# Patient Record
Sex: MALE | Race: WHITE | Employment: PART TIME | URBAN - METROPOLITAN AREA
[De-identification: names, ages, dates, MRNs, and addresses within clinical notes are randomized per-mention and may not be internally consistent; named-entity substitution may affect disease eponyms.]

---

## 2017-03-11 ENCOUNTER — HOSPITAL ENCOUNTER (OUTPATIENT)
Dept: MRI IMAGING | Age: 57
Discharge: HOME | End: 2017-03-11
Attending: PAIN MEDICINE
Payer: MEDICARE

## 2017-03-11 DIAGNOSIS — M54.16 LUMBAR RADICULOPATHY: ICD-10-CM

## 2017-03-11 DIAGNOSIS — M79.18 GLUTEAL PAIN: ICD-10-CM

## 2017-03-11 DIAGNOSIS — M47.26 OTHER SPONDYLOSIS WITH RADICULOPATHY, LUMBAR REGION: ICD-10-CM

## 2017-03-11 PROCEDURE — 72148 MRI LUMBAR SPINE W/O DYE: CPT

## 2017-08-30 ENCOUNTER — HOSPITAL ENCOUNTER (OUTPATIENT)
Dept: PREADMISSION TESTING | Age: 57
Discharge: HOME | End: 2017-08-30
Payer: MEDICARE

## 2017-08-30 VITALS
TEMPERATURE: 97.4 F | HEIGHT: 71 IN | BODY MASS INDEX: 23.8 KG/M2 | SYSTOLIC BLOOD PRESSURE: 161 MMHG | RESPIRATION RATE: 18 BRPM | WEIGHT: 170 LBS | HEART RATE: 47 BPM | DIASTOLIC BLOOD PRESSURE: 83 MMHG

## 2017-08-30 LAB
ABO + RH BLD: NORMAL
ANION GAP SERPL CALC-SCNC: 9 MMOL/L (ref 5–15)
APPEARANCE UR: CLEAR
BACTERIA URNS QL MICRO: NEGATIVE /HPF
BILIRUB UR QL: NEGATIVE
BLOOD GROUP ANTIBODIES SERPL: NORMAL
BUN SERPL-MCNC: 11 MG/DL (ref 6–20)
BUN/CREAT SERPL: 9 (ref 12–20)
CALCIUM SERPL-MCNC: 9.1 MG/DL (ref 8.5–10.1)
CHLORIDE SERPL-SCNC: 102 MMOL/L (ref 97–108)
CO2 SERPL-SCNC: 28 MMOL/L (ref 21–32)
COLOR UR: NORMAL
CREAT SERPL-MCNC: 1.27 MG/DL (ref 0.7–1.3)
EPITH CASTS URNS QL MICRO: NORMAL /LPF
ERYTHROCYTE [DISTWIDTH] IN BLOOD BY AUTOMATED COUNT: 15 % (ref 11.5–14.5)
EST. AVERAGE GLUCOSE BLD GHB EST-MCNC: 117 MG/DL
GLUCOSE SERPL-MCNC: 110 MG/DL (ref 65–100)
GLUCOSE UR STRIP.AUTO-MCNC: NEGATIVE MG/DL
HBA1C MFR BLD: 5.7 % (ref 4.2–6.3)
HCT VFR BLD AUTO: 48.5 % (ref 36.6–50.3)
HGB BLD-MCNC: 16.3 G/DL (ref 12.1–17)
HGB UR QL STRIP: NEGATIVE
HYALINE CASTS URNS QL MICRO: NORMAL /LPF (ref 0–5)
INR PPP: 1 (ref 0.9–1.1)
KETONES UR QL STRIP.AUTO: NEGATIVE MG/DL
LEUKOCYTE ESTERASE UR QL STRIP.AUTO: NEGATIVE
MCH RBC QN AUTO: 31.2 PG (ref 26–34)
MCHC RBC AUTO-ENTMCNC: 33.6 G/DL (ref 30–36.5)
MCV RBC AUTO: 92.9 FL (ref 80–99)
NITRITE UR QL STRIP.AUTO: NEGATIVE
PH UR STRIP: 6 [PH] (ref 5–8)
PLATELET # BLD AUTO: 301 K/UL (ref 150–400)
POTASSIUM SERPL-SCNC: 4.1 MMOL/L (ref 3.5–5.1)
PROT UR STRIP-MCNC: NEGATIVE MG/DL
PROTHROMBIN TIME: 9.6 SEC (ref 9–11.1)
RBC # BLD AUTO: 5.22 M/UL (ref 4.1–5.7)
RBC #/AREA URNS HPF: NORMAL /HPF (ref 0–5)
SODIUM SERPL-SCNC: 139 MMOL/L (ref 136–145)
SP GR UR REFRACTOMETRY: 1.02 (ref 1–1.03)
SPECIMEN EXP DATE BLD: NORMAL
UA: UC IF INDICATED,UAUC: NORMAL
UROBILINOGEN UR QL STRIP.AUTO: 0.2 EU/DL (ref 0.2–1)
WBC # BLD AUTO: 5.2 K/UL (ref 4.1–11.1)
WBC URNS QL MICRO: NORMAL /HPF (ref 0–4)

## 2017-08-30 PROCEDURE — 83036 HEMOGLOBIN GLYCOSYLATED A1C: CPT | Performed by: ORTHOPAEDIC SURGERY

## 2017-08-30 PROCEDURE — 85610 PROTHROMBIN TIME: CPT | Performed by: ORTHOPAEDIC SURGERY

## 2017-08-30 PROCEDURE — 86900 BLOOD TYPING SEROLOGIC ABO: CPT | Performed by: ORTHOPAEDIC SURGERY

## 2017-08-30 PROCEDURE — 81001 URINALYSIS AUTO W/SCOPE: CPT | Performed by: ORTHOPAEDIC SURGERY

## 2017-08-30 PROCEDURE — 93005 ELECTROCARDIOGRAM TRACING: CPT

## 2017-08-30 PROCEDURE — 85027 COMPLETE CBC AUTOMATED: CPT | Performed by: ORTHOPAEDIC SURGERY

## 2017-08-30 PROCEDURE — 36415 COLL VENOUS BLD VENIPUNCTURE: CPT | Performed by: ORTHOPAEDIC SURGERY

## 2017-08-30 PROCEDURE — 80048 BASIC METABOLIC PNL TOTAL CA: CPT | Performed by: ORTHOPAEDIC SURGERY

## 2017-08-30 RX ORDER — IBUPROFEN 200 MG
600 TABLET ORAL AS NEEDED
Status: ON HOLD | COMMUNITY
End: 2017-09-20

## 2017-08-30 NOTE — PERIOP NOTES
PREOPERATIVE INSTRUCTIONS REVIEWED WITH PATIENT. PATIENT GIVEN SIX PACK OF CHG WIPES. INSTRUCTIONS  TO BE REVIEWED  IN CLASS] ON USE OF CHG WIPES. PATIENT GIVEN SSI INFECTION FAQ SHEET, INFORMATION SHEET ON DIABETIC TREATMENT CENTER AS WELL AS HAND WASHING TIPS SHEETS. MRSA/MSSA TREATMENT INSTRUCTION SHEET GIVEN WITH AN EXPLANATION TO PATIENT THAT THEY WILL BE NOTIFIED IF TREATMENT INSTRUCTIONS NEED TO BE INITIATED. PATIENT WAS GIVEN THE OPPORTUNITY TO ASK QUESTIONS ON THE INFORMATION PROVIDED.

## 2017-08-31 LAB
ATRIAL RATE: 52 BPM
BACTERIA SPEC CULT: NORMAL
BACTERIA SPEC CULT: NORMAL
CALCULATED P AXIS, ECG09: 61 DEGREES
CALCULATED R AXIS, ECG10: -23 DEGREES
CALCULATED T AXIS, ECG11: 45 DEGREES
DIAGNOSIS, 93000: NORMAL
P-R INTERVAL, ECG05: 140 MS
Q-T INTERVAL, ECG07: 452 MS
QRS DURATION, ECG06: 84 MS
QTC CALCULATION (BEZET), ECG08: 420 MS
SERVICE CMNT-IMP: NORMAL
VENTRICULAR RATE, ECG03: 52 BPM

## 2017-09-20 ENCOUNTER — ANESTHESIA (OUTPATIENT)
Dept: SURGERY | Age: 57
DRG: 460 | End: 2017-09-20
Payer: MEDICARE

## 2017-09-20 ENCOUNTER — HOSPITAL ENCOUNTER (INPATIENT)
Age: 57
LOS: 4 days | Discharge: HOME HEALTH | DRG: 460 | End: 2017-09-24
Attending: ORTHOPAEDIC SURGERY | Admitting: ORTHOPAEDIC SURGERY
Payer: MEDICARE

## 2017-09-20 ENCOUNTER — APPOINTMENT (OUTPATIENT)
Dept: GENERAL RADIOLOGY | Age: 57
DRG: 460 | End: 2017-09-20
Attending: ORTHOPAEDIC SURGERY
Payer: MEDICARE

## 2017-09-20 ENCOUNTER — ANESTHESIA EVENT (OUTPATIENT)
Dept: SURGERY | Age: 57
DRG: 460 | End: 2017-09-20
Payer: MEDICARE

## 2017-09-20 PROBLEM — M48.00 SPINAL STENOSIS: Status: ACTIVE | Noted: 2017-09-20

## 2017-09-20 LAB
ABO + RH BLD: NORMAL
BLOOD GROUP ANTIBODIES SERPL: NORMAL
GLUCOSE BLD STRIP.AUTO-MCNC: 97 MG/DL (ref 65–100)
SERVICE CMNT-IMP: NORMAL
SPECIMEN EXP DATE BLD: NORMAL

## 2017-09-20 PROCEDURE — 74011250637 HC RX REV CODE- 250/637: Performed by: ANESTHESIOLOGY

## 2017-09-20 PROCEDURE — C1713 ANCHOR/SCREW BN/BN,TIS/BN: HCPCS | Performed by: ORTHOPAEDIC SURGERY

## 2017-09-20 PROCEDURE — 77030034479 HC ADH SKN CLSR PRINEO J&J -B: Performed by: ORTHOPAEDIC SURGERY

## 2017-09-20 PROCEDURE — 65270000032 HC RM SEMIPRIVATE

## 2017-09-20 PROCEDURE — 76010000172 HC OR TIME 2.5 TO 3 HR INTENSV-TIER 1: Performed by: ORTHOPAEDIC SURGERY

## 2017-09-20 PROCEDURE — 77030032490 HC SLV COMPR SCD KNE COVD -B: Performed by: ORTHOPAEDIC SURGERY

## 2017-09-20 PROCEDURE — 77030014007 HC SPNG HEMSTAT J&J -B: Performed by: ORTHOPAEDIC SURGERY

## 2017-09-20 PROCEDURE — 77030018836 HC SOL IRR NACL ICUM -A: Performed by: ORTHOPAEDIC SURGERY

## 2017-09-20 PROCEDURE — 77030012406 HC DRN WND PENRS BARD -A: Performed by: ORTHOPAEDIC SURGERY

## 2017-09-20 PROCEDURE — 82962 GLUCOSE BLOOD TEST: CPT

## 2017-09-20 PROCEDURE — 74011250636 HC RX REV CODE- 250/636: Performed by: ANESTHESIOLOGY

## 2017-09-20 PROCEDURE — 74011250636 HC RX REV CODE- 250/636

## 2017-09-20 PROCEDURE — 76060000036 HC ANESTHESIA 2.5 TO 3 HR: Performed by: ORTHOPAEDIC SURGERY

## 2017-09-20 PROCEDURE — 74011000250 HC RX REV CODE- 250

## 2017-09-20 PROCEDURE — 77030029099 HC BN WAX SSPC -A: Performed by: ORTHOPAEDIC SURGERY

## 2017-09-20 PROCEDURE — 77030034850: Performed by: ORTHOPAEDIC SURGERY

## 2017-09-20 PROCEDURE — 72100 X-RAY EXAM L-S SPINE 2/3 VWS: CPT

## 2017-09-20 PROCEDURE — 77030031139 HC SUT VCRL2 J&J -A: Performed by: ORTHOPAEDIC SURGERY

## 2017-09-20 PROCEDURE — 77030012893

## 2017-09-20 PROCEDURE — 74011250636 HC RX REV CODE- 250/636: Performed by: ORTHOPAEDIC SURGERY

## 2017-09-20 PROCEDURE — 74011000258 HC RX REV CODE- 258: Performed by: ORTHOPAEDIC SURGERY

## 2017-09-20 PROCEDURE — 76210000016 HC OR PH I REC 1 TO 1.5 HR: Performed by: ORTHOPAEDIC SURGERY

## 2017-09-20 PROCEDURE — 86900 BLOOD TYPING SEROLOGIC ABO: CPT | Performed by: ORTHOPAEDIC SURGERY

## 2017-09-20 PROCEDURE — 74011250637 HC RX REV CODE- 250/637: Performed by: PHYSICIAN ASSISTANT

## 2017-09-20 PROCEDURE — 77030004391 HC BUR FLUT MEDT -C: Performed by: ORTHOPAEDIC SURGERY

## 2017-09-20 PROCEDURE — 74011000250 HC RX REV CODE- 250: Performed by: ORTHOPAEDIC SURGERY

## 2017-09-20 PROCEDURE — 74011250636 HC RX REV CODE- 250/636: Performed by: PHYSICIAN ASSISTANT

## 2017-09-20 PROCEDURE — 77030020782 HC GWN BAIR PAWS FLX 3M -B

## 2017-09-20 PROCEDURE — 36415 COLL VENOUS BLD VENIPUNCTURE: CPT | Performed by: ORTHOPAEDIC SURGERY

## 2017-09-20 DEVICE — GRAFT BNE SUB 10ML ALLGRFT PTTY OSTEOAMP: Type: IMPLANTABLE DEVICE | Site: SPINE LUMBAR | Status: FUNCTIONAL

## 2017-09-20 DEVICE — GRAFT BNE SUB 20CC 2 4MM GRAN GROWTH FACT ALLGRFT OSTEOAMP: Type: IMPLANTABLE DEVICE | Site: SPINE LUMBAR | Status: FUNCTIONAL

## 2017-09-20 DEVICE — 5.5MM CURVED ROD, TITANIUM ALLOY, 65MM LENGTH
Type: IMPLANTABLE DEVICE | Site: SPINE LUMBAR | Status: FUNCTIONAL
Brand: CREO

## 2017-09-20 DEVICE — CREO&REG; 5.5, 6.5 X 45MM POLYAXIAL SCREW
Type: IMPLANTABLE DEVICE | Site: SPINE LUMBAR | Status: FUNCTIONAL
Brand: CREO

## 2017-09-20 DEVICE — CREO&REG; 5.5, 7.5 X 45MM POLYAXIAL SCREW
Type: IMPLANTABLE DEVICE | Site: SPINE LUMBAR | Status: FUNCTIONAL
Brand: CREO

## 2017-09-20 DEVICE — 5.5 LOCKING CAP, CREO
Type: IMPLANTABLE DEVICE | Site: SPINE LUMBAR | Status: FUNCTIONAL
Brand: CREO

## 2017-09-20 RX ORDER — KETAMINE HYDROCHLORIDE 10 MG/ML
INJECTION, SOLUTION INTRAMUSCULAR; INTRAVENOUS AS NEEDED
Status: DISCONTINUED | OUTPATIENT
Start: 2017-09-20 | End: 2017-09-20 | Stop reason: HOSPADM

## 2017-09-20 RX ORDER — DIPHENHYDRAMINE HYDROCHLORIDE 50 MG/ML
12.5 INJECTION, SOLUTION INTRAMUSCULAR; INTRAVENOUS
Status: ACTIVE | OUTPATIENT
Start: 2017-09-20 | End: 2017-09-23

## 2017-09-20 RX ORDER — ROCURONIUM BROMIDE 10 MG/ML
INJECTION, SOLUTION INTRAVENOUS AS NEEDED
Status: DISCONTINUED | OUTPATIENT
Start: 2017-09-20 | End: 2017-09-20 | Stop reason: HOSPADM

## 2017-09-20 RX ORDER — FENTANYL CITRATE 50 UG/ML
50 INJECTION, SOLUTION INTRAMUSCULAR; INTRAVENOUS AS NEEDED
Status: DISCONTINUED | OUTPATIENT
Start: 2017-09-20 | End: 2017-09-20 | Stop reason: HOSPADM

## 2017-09-20 RX ORDER — DEXMEDETOMIDINE HYDROCHLORIDE 4 UG/ML
INJECTION, SOLUTION INTRAVENOUS AS NEEDED
Status: DISCONTINUED | OUTPATIENT
Start: 2017-09-20 | End: 2017-09-20 | Stop reason: HOSPADM

## 2017-09-20 RX ORDER — SODIUM CHLORIDE 0.9 % (FLUSH) 0.9 %
5-10 SYRINGE (ML) INJECTION EVERY 8 HOURS
Status: DISCONTINUED | OUTPATIENT
Start: 2017-09-21 | End: 2017-09-24 | Stop reason: HOSPADM

## 2017-09-20 RX ORDER — MORPHINE SULFATE 10 MG/ML
2 INJECTION, SOLUTION INTRAMUSCULAR; INTRAVENOUS
Status: DISCONTINUED | OUTPATIENT
Start: 2017-09-20 | End: 2017-09-20 | Stop reason: HOSPADM

## 2017-09-20 RX ORDER — SODIUM CHLORIDE 0.9 % (FLUSH) 0.9 %
5-10 SYRINGE (ML) INJECTION AS NEEDED
Status: DISCONTINUED | OUTPATIENT
Start: 2017-09-20 | End: 2017-09-24 | Stop reason: HOSPADM

## 2017-09-20 RX ORDER — DEXAMETHASONE SODIUM PHOSPHATE 4 MG/ML
INJECTION, SOLUTION INTRA-ARTICULAR; INTRALESIONAL; INTRAMUSCULAR; INTRAVENOUS; SOFT TISSUE AS NEEDED
Status: DISCONTINUED | OUTPATIENT
Start: 2017-09-20 | End: 2017-09-20 | Stop reason: HOSPADM

## 2017-09-20 RX ORDER — FACIAL-BODY WIPES
10 EACH TOPICAL DAILY PRN
Status: DISCONTINUED | OUTPATIENT
Start: 2017-09-22 | End: 2017-09-24 | Stop reason: HOSPADM

## 2017-09-20 RX ORDER — OXYCODONE AND ACETAMINOPHEN 5; 325 MG/1; MG/1
2 TABLET ORAL
COMMUNITY

## 2017-09-20 RX ORDER — ACETAMINOPHEN 325 MG/1
650 TABLET ORAL ONCE
Status: COMPLETED | OUTPATIENT
Start: 2017-09-20 | End: 2017-09-20

## 2017-09-20 RX ORDER — PANTOPRAZOLE SODIUM 40 MG/1
40 TABLET, DELAYED RELEASE ORAL DAILY
Status: DISCONTINUED | OUTPATIENT
Start: 2017-09-21 | End: 2017-09-21

## 2017-09-20 RX ORDER — FENTANYL CITRATE 50 UG/ML
25 INJECTION, SOLUTION INTRAMUSCULAR; INTRAVENOUS
Status: DISCONTINUED | OUTPATIENT
Start: 2017-09-20 | End: 2017-09-20 | Stop reason: HOSPADM

## 2017-09-20 RX ORDER — LIDOCAINE HYDROCHLORIDE 10 MG/ML
0.1 INJECTION, SOLUTION EPIDURAL; INFILTRATION; INTRACAUDAL; PERINEURAL AS NEEDED
Status: DISCONTINUED | OUTPATIENT
Start: 2017-09-20 | End: 2017-09-20 | Stop reason: HOSPADM

## 2017-09-20 RX ORDER — ONDANSETRON 2 MG/ML
4 INJECTION INTRAMUSCULAR; INTRAVENOUS AS NEEDED
Status: DISCONTINUED | OUTPATIENT
Start: 2017-09-20 | End: 2017-09-20 | Stop reason: HOSPADM

## 2017-09-20 RX ORDER — CEFAZOLIN SODIUM IN 0.9 % NACL 2 G/50 ML
2 INTRAVENOUS SOLUTION, PIGGYBACK (ML) INTRAVENOUS EVERY 8 HOURS
Status: COMPLETED | OUTPATIENT
Start: 2017-09-21 | End: 2017-09-21

## 2017-09-20 RX ORDER — SODIUM CHLORIDE 0.9 % (FLUSH) 0.9 %
5-10 SYRINGE (ML) INJECTION EVERY 8 HOURS
Status: DISCONTINUED | OUTPATIENT
Start: 2017-09-20 | End: 2017-09-20 | Stop reason: HOSPADM

## 2017-09-20 RX ORDER — HYDROMORPHONE HYDROCHLORIDE 1 MG/ML
INJECTION, SOLUTION INTRAMUSCULAR; INTRAVENOUS; SUBCUTANEOUS AS NEEDED
Status: DISCONTINUED | OUTPATIENT
Start: 2017-09-20 | End: 2017-09-20 | Stop reason: HOSPADM

## 2017-09-20 RX ORDER — CEFAZOLIN SODIUM IN 0.9 % NACL 2 G/50 ML
2 INTRAVENOUS SOLUTION, PIGGYBACK (ML) INTRAVENOUS ONCE
Status: COMPLETED | OUTPATIENT
Start: 2017-09-20 | End: 2017-09-20

## 2017-09-20 RX ORDER — DIPHENHYDRAMINE HYDROCHLORIDE 50 MG/ML
12.5 INJECTION, SOLUTION INTRAMUSCULAR; INTRAVENOUS AS NEEDED
Status: DISCONTINUED | OUTPATIENT
Start: 2017-09-20 | End: 2017-09-20 | Stop reason: HOSPADM

## 2017-09-20 RX ORDER — ONDANSETRON 2 MG/ML
4 INJECTION INTRAMUSCULAR; INTRAVENOUS
Status: ACTIVE | OUTPATIENT
Start: 2017-09-20 | End: 2017-09-23

## 2017-09-20 RX ORDER — MIDAZOLAM HYDROCHLORIDE 1 MG/ML
1 INJECTION, SOLUTION INTRAMUSCULAR; INTRAVENOUS AS NEEDED
Status: DISCONTINUED | OUTPATIENT
Start: 2017-09-20 | End: 2017-09-20 | Stop reason: HOSPADM

## 2017-09-20 RX ORDER — SODIUM CHLORIDE 9 MG/ML
25 INJECTION, SOLUTION INTRAVENOUS CONTINUOUS
Status: DISCONTINUED | OUTPATIENT
Start: 2017-09-20 | End: 2017-09-20 | Stop reason: HOSPADM

## 2017-09-20 RX ORDER — SODIUM CHLORIDE, SODIUM LACTATE, POTASSIUM CHLORIDE, CALCIUM CHLORIDE 600; 310; 30; 20 MG/100ML; MG/100ML; MG/100ML; MG/100ML
25 INJECTION, SOLUTION INTRAVENOUS CONTINUOUS
Status: DISCONTINUED | OUTPATIENT
Start: 2017-09-20 | End: 2017-09-20 | Stop reason: HOSPADM

## 2017-09-20 RX ORDER — SODIUM CHLORIDE 9 MG/ML
125 INJECTION, SOLUTION INTRAVENOUS CONTINUOUS
Status: DISPENSED | OUTPATIENT
Start: 2017-09-20 | End: 2017-09-21

## 2017-09-20 RX ORDER — SODIUM CHLORIDE 0.9 % (FLUSH) 0.9 %
5-10 SYRINGE (ML) INJECTION AS NEEDED
Status: DISCONTINUED | OUTPATIENT
Start: 2017-09-20 | End: 2017-09-20 | Stop reason: HOSPADM

## 2017-09-20 RX ORDER — GLYCOPYRROLATE 0.2 MG/ML
INJECTION INTRAMUSCULAR; INTRAVENOUS AS NEEDED
Status: DISCONTINUED | OUTPATIENT
Start: 2017-09-20 | End: 2017-09-20 | Stop reason: HOSPADM

## 2017-09-20 RX ORDER — AMOXICILLIN 250 MG
1 CAPSULE ORAL 2 TIMES DAILY
Status: DISCONTINUED | OUTPATIENT
Start: 2017-09-21 | End: 2017-09-24 | Stop reason: HOSPADM

## 2017-09-20 RX ORDER — OXYCODONE HYDROCHLORIDE 5 MG/1
5 TABLET ORAL
Status: DISCONTINUED | OUTPATIENT
Start: 2017-09-20 | End: 2017-09-24 | Stop reason: HOSPADM

## 2017-09-20 RX ORDER — OXYCODONE HYDROCHLORIDE 5 MG/1
5 TABLET ORAL AS NEEDED
Status: DISCONTINUED | OUTPATIENT
Start: 2017-09-20 | End: 2017-09-20 | Stop reason: HOSPADM

## 2017-09-20 RX ORDER — MORPHINE SULFATE 5 MG/ML
INJECTION, SOLUTION INTRAVENOUS
Status: DISCONTINUED | OUTPATIENT
Start: 2017-09-20 | End: 2017-09-21

## 2017-09-20 RX ORDER — QUETIAPINE FUMARATE 100 MG/1
300 TABLET, FILM COATED ORAL DAILY
Status: DISCONTINUED | OUTPATIENT
Start: 2017-09-21 | End: 2017-09-24 | Stop reason: HOSPADM

## 2017-09-20 RX ORDER — MIDAZOLAM HYDROCHLORIDE 1 MG/ML
INJECTION, SOLUTION INTRAMUSCULAR; INTRAVENOUS AS NEEDED
Status: DISCONTINUED | OUTPATIENT
Start: 2017-09-20 | End: 2017-09-20 | Stop reason: HOSPADM

## 2017-09-20 RX ORDER — OXYCODONE HYDROCHLORIDE 5 MG/1
10 TABLET ORAL
Status: DISCONTINUED | OUTPATIENT
Start: 2017-09-20 | End: 2017-09-24 | Stop reason: HOSPADM

## 2017-09-20 RX ORDER — LIDOCAINE HYDROCHLORIDE 20 MG/ML
INJECTION, SOLUTION EPIDURAL; INFILTRATION; INTRACAUDAL; PERINEURAL AS NEEDED
Status: DISCONTINUED | OUTPATIENT
Start: 2017-09-20 | End: 2017-09-20 | Stop reason: HOSPADM

## 2017-09-20 RX ORDER — MIDAZOLAM HYDROCHLORIDE 1 MG/ML
0.5 INJECTION, SOLUTION INTRAMUSCULAR; INTRAVENOUS
Status: DISCONTINUED | OUTPATIENT
Start: 2017-09-20 | End: 2017-09-20 | Stop reason: HOSPADM

## 2017-09-20 RX ORDER — SODIUM CHLORIDE, SODIUM LACTATE, POTASSIUM CHLORIDE, CALCIUM CHLORIDE 600; 310; 30; 20 MG/100ML; MG/100ML; MG/100ML; MG/100ML
INJECTION, SOLUTION INTRAVENOUS
Status: DISCONTINUED | OUTPATIENT
Start: 2017-09-20 | End: 2017-09-20 | Stop reason: HOSPADM

## 2017-09-20 RX ORDER — NEOSTIGMINE METHYLSULFATE 1 MG/ML
INJECTION INTRAVENOUS AS NEEDED
Status: DISCONTINUED | OUTPATIENT
Start: 2017-09-20 | End: 2017-09-20 | Stop reason: HOSPADM

## 2017-09-20 RX ORDER — ACETAMINOPHEN 325 MG/1
650 TABLET ORAL EVERY 6 HOURS
Status: DISCONTINUED | OUTPATIENT
Start: 2017-09-21 | End: 2017-09-24 | Stop reason: HOSPADM

## 2017-09-20 RX ORDER — NALOXONE HYDROCHLORIDE 0.4 MG/ML
0.4 INJECTION, SOLUTION INTRAMUSCULAR; INTRAVENOUS; SUBCUTANEOUS AS NEEDED
Status: DISCONTINUED | OUTPATIENT
Start: 2017-09-20 | End: 2017-09-24 | Stop reason: HOSPADM

## 2017-09-20 RX ORDER — HYDROMORPHONE HYDROCHLORIDE 1 MG/ML
0.2 INJECTION, SOLUTION INTRAMUSCULAR; INTRAVENOUS; SUBCUTANEOUS
Status: DISCONTINUED | OUTPATIENT
Start: 2017-09-20 | End: 2017-09-20 | Stop reason: HOSPADM

## 2017-09-20 RX ORDER — PROPOFOL 10 MG/ML
INJECTION, EMULSION INTRAVENOUS AS NEEDED
Status: DISCONTINUED | OUTPATIENT
Start: 2017-09-20 | End: 2017-09-20 | Stop reason: HOSPADM

## 2017-09-20 RX ORDER — SODIUM CHLORIDE 0.9 % (FLUSH) 0.9 %
5-10 SYRINGE (ML) INJECTION EVERY 8 HOURS
Status: DISCONTINUED | OUTPATIENT
Start: 2017-09-20 | End: 2017-09-24 | Stop reason: HOSPADM

## 2017-09-20 RX ORDER — SODIUM CHLORIDE, SODIUM LACTATE, POTASSIUM CHLORIDE, CALCIUM CHLORIDE 600; 310; 30; 20 MG/100ML; MG/100ML; MG/100ML; MG/100ML
125 INJECTION, SOLUTION INTRAVENOUS CONTINUOUS
Status: DISCONTINUED | OUTPATIENT
Start: 2017-09-20 | End: 2017-09-20 | Stop reason: HOSPADM

## 2017-09-20 RX ORDER — ONDANSETRON 2 MG/ML
INJECTION INTRAMUSCULAR; INTRAVENOUS AS NEEDED
Status: DISCONTINUED | OUTPATIENT
Start: 2017-09-20 | End: 2017-09-20 | Stop reason: HOSPADM

## 2017-09-20 RX ORDER — SUCCINYLCHOLINE CHLORIDE 20 MG/ML
INJECTION INTRAMUSCULAR; INTRAVENOUS AS NEEDED
Status: DISCONTINUED | OUTPATIENT
Start: 2017-09-20 | End: 2017-09-20 | Stop reason: HOSPADM

## 2017-09-20 RX ORDER — DIAZEPAM 5 MG/1
5 TABLET ORAL
Status: DISCONTINUED | OUTPATIENT
Start: 2017-09-20 | End: 2017-09-24 | Stop reason: HOSPADM

## 2017-09-20 RX ORDER — FENTANYL CITRATE 50 UG/ML
INJECTION, SOLUTION INTRAMUSCULAR; INTRAVENOUS AS NEEDED
Status: DISCONTINUED | OUTPATIENT
Start: 2017-09-20 | End: 2017-09-20 | Stop reason: HOSPADM

## 2017-09-20 RX ADMIN — HYDROMORPHONE HYDROCHLORIDE 0.5 MG: 1 INJECTION, SOLUTION INTRAMUSCULAR; INTRAVENOUS; SUBCUTANEOUS at 20:45

## 2017-09-20 RX ADMIN — ROCURONIUM BROMIDE 10 MG: 10 INJECTION, SOLUTION INTRAVENOUS at 19:51

## 2017-09-20 RX ADMIN — ACETAMINOPHEN 650 MG: 325 TABLET, FILM COATED ORAL at 23:31

## 2017-09-20 RX ADMIN — DEXMEDETOMIDINE HYDROCHLORIDE 4 MCG: 4 INJECTION, SOLUTION INTRAVENOUS at 21:04

## 2017-09-20 RX ADMIN — ROCURONIUM BROMIDE 10 MG: 10 INJECTION, SOLUTION INTRAVENOUS at 19:27

## 2017-09-20 RX ADMIN — FENTANYL CITRATE 50 MCG: 50 INJECTION, SOLUTION INTRAMUSCULAR; INTRAVENOUS at 18:05

## 2017-09-20 RX ADMIN — CEFAZOLIN 2 G: 1 INJECTION, POWDER, FOR SOLUTION INTRAMUSCULAR; INTRAVENOUS; PARENTERAL at 18:29

## 2017-09-20 RX ADMIN — Medication: at 21:32

## 2017-09-20 RX ADMIN — SODIUM CHLORIDE, SODIUM LACTATE, POTASSIUM CHLORIDE, CALCIUM CHLORIDE: 600; 310; 30; 20 INJECTION, SOLUTION INTRAVENOUS at 17:45

## 2017-09-20 RX ADMIN — DEXMEDETOMIDINE HYDROCHLORIDE 4 MCG: 4 INJECTION, SOLUTION INTRAVENOUS at 20:59

## 2017-09-20 RX ADMIN — ONDANSETRON 4 MG: 2 INJECTION INTRAMUSCULAR; INTRAVENOUS at 20:33

## 2017-09-20 RX ADMIN — SODIUM CHLORIDE, SODIUM LACTATE, POTASSIUM CHLORIDE, AND CALCIUM CHLORIDE 25 ML/HR: 600; 310; 30; 20 INJECTION, SOLUTION INTRAVENOUS at 16:27

## 2017-09-20 RX ADMIN — LIDOCAINE HYDROCHLORIDE 60 MG: 20 INJECTION, SOLUTION EPIDURAL; INFILTRATION; INTRACAUDAL; PERINEURAL at 18:12

## 2017-09-20 RX ADMIN — ROCURONIUM BROMIDE 25 MG: 10 INJECTION, SOLUTION INTRAVENOUS at 18:19

## 2017-09-20 RX ADMIN — DEXAMETHASONE SODIUM PHOSPHATE 8 MG: 4 INJECTION, SOLUTION INTRA-ARTICULAR; INTRALESIONAL; INTRAMUSCULAR; INTRAVENOUS; SOFT TISSUE at 18:32

## 2017-09-20 RX ADMIN — FENTANYL CITRATE 50 MCG: 50 INJECTION, SOLUTION INTRAMUSCULAR; INTRAVENOUS at 18:09

## 2017-09-20 RX ADMIN — FENTANYL CITRATE 50 MCG: 50 INJECTION, SOLUTION INTRAMUSCULAR; INTRAVENOUS at 19:53

## 2017-09-20 RX ADMIN — HYDROMORPHONE HYDROCHLORIDE 0.5 MG: 1 INJECTION, SOLUTION INTRAMUSCULAR; INTRAVENOUS; SUBCUTANEOUS at 20:54

## 2017-09-20 RX ADMIN — HYDROMORPHONE HYDROCHLORIDE 0.5 MG: 1 INJECTION, SOLUTION INTRAMUSCULAR; INTRAVENOUS; SUBCUTANEOUS at 20:22

## 2017-09-20 RX ADMIN — MIDAZOLAM HYDROCHLORIDE 2 MG: 1 INJECTION, SOLUTION INTRAMUSCULAR; INTRAVENOUS at 18:05

## 2017-09-20 RX ADMIN — ROCURONIUM BROMIDE 10 MG: 10 INJECTION, SOLUTION INTRAVENOUS at 18:56

## 2017-09-20 RX ADMIN — DEXMEDETOMIDINE HYDROCHLORIDE 4 MCG: 4 INJECTION, SOLUTION INTRAVENOUS at 21:06

## 2017-09-20 RX ADMIN — SODIUM CHLORIDE, SODIUM LACTATE, POTASSIUM CHLORIDE, CALCIUM CHLORIDE: 600; 310; 30; 20 INJECTION, SOLUTION INTRAVENOUS at 21:01

## 2017-09-20 RX ADMIN — DEXMEDETOMIDINE HYDROCHLORIDE 4 MCG: 4 INJECTION, SOLUTION INTRAVENOUS at 21:08

## 2017-09-20 RX ADMIN — DEXMEDETOMIDINE HYDROCHLORIDE 4 MCG: 4 INJECTION, SOLUTION INTRAVENOUS at 21:10

## 2017-09-20 RX ADMIN — Medication 10 ML: at 23:33

## 2017-09-20 RX ADMIN — NEOSTIGMINE METHYLSULFATE 3 MG: 1 INJECTION INTRAVENOUS at 20:31

## 2017-09-20 RX ADMIN — KETAMINE HYDROCHLORIDE 20 MG: 10 INJECTION, SOLUTION INTRAMUSCULAR; INTRAVENOUS at 19:19

## 2017-09-20 RX ADMIN — SODIUM CHLORIDE, SODIUM LACTATE, POTASSIUM CHLORIDE, CALCIUM CHLORIDE: 600; 310; 30; 20 INJECTION, SOLUTION INTRAVENOUS at 19:15

## 2017-09-20 RX ADMIN — PROPOFOL 200 MG: 10 INJECTION, EMULSION INTRAVENOUS at 18:12

## 2017-09-20 RX ADMIN — HYDROMORPHONE HYDROCHLORIDE 0.5 MG: 1 INJECTION, SOLUTION INTRAMUSCULAR; INTRAVENOUS; SUBCUTANEOUS at 18:56

## 2017-09-20 RX ADMIN — SODIUM CHLORIDE 125 ML/HR: 900 INJECTION, SOLUTION INTRAVENOUS at 21:45

## 2017-09-20 RX ADMIN — FENTANYL CITRATE 50 MCG: 50 INJECTION, SOLUTION INTRAMUSCULAR; INTRAVENOUS at 18:12

## 2017-09-20 RX ADMIN — GLYCOPYRROLATE 0.5 MG: 0.2 INJECTION INTRAMUSCULAR; INTRAVENOUS at 20:31

## 2017-09-20 RX ADMIN — ACETAMINOPHEN 650 MG: 325 TABLET, FILM COATED ORAL at 16:27

## 2017-09-20 RX ADMIN — ROCURONIUM BROMIDE 5 MG: 10 INJECTION, SOLUTION INTRAVENOUS at 18:12

## 2017-09-20 RX ADMIN — DEXMEDETOMIDINE HYDROCHLORIDE 4 MCG: 4 INJECTION, SOLUTION INTRAVENOUS at 21:02

## 2017-09-20 RX ADMIN — DEXMEDETOMIDINE HYDROCHLORIDE 4 MCG: 4 INJECTION, SOLUTION INTRAVENOUS at 21:12

## 2017-09-20 RX ADMIN — DIAZEPAM 5 MG: 5 TABLET ORAL at 23:31

## 2017-09-20 RX ADMIN — KETAMINE HYDROCHLORIDE 30 MG: 10 INJECTION, SOLUTION INTRAMUSCULAR; INTRAVENOUS at 18:25

## 2017-09-20 RX ADMIN — SUCCINYLCHOLINE CHLORIDE 120 MG: 20 INJECTION INTRAMUSCULAR; INTRAVENOUS at 18:12

## 2017-09-20 RX ADMIN — FENTANYL CITRATE 50 MCG: 50 INJECTION, SOLUTION INTRAMUSCULAR; INTRAVENOUS at 20:37

## 2017-09-20 NOTE — ANESTHESIA PREPROCEDURE EVALUATION
Anesthetic History               Review of Systems / Medical History  Patient summary reviewed and pertinent labs reviewed    Pulmonary          Smoker         Neuro/Psych              Cardiovascular                       GI/Hepatic/Renal     GERD: well controlled           Endo/Other             Other Findings              Physical Exam    Airway  Mallampati: I  TM Distance: 4 - 6 cm  Neck ROM: normal range of motion   Mouth opening: Normal     Cardiovascular    Rhythm: regular  Rate: normal         Dental  No notable dental hx       Pulmonary  Breath sounds clear to auscultation               Abdominal         Other Findings            Anesthetic Plan    ASA: 2  Anesthesia type: general            Anesthetic plan and risks discussed with: Patient

## 2017-09-20 NOTE — IP AVS SNAPSHOT
[address] [facility] [address] [place] [address] 
[phone] Patient: Laura Perkins MRN: [id number] Current Discharge Medication List  
  
START taking these medications Dose & Instructions Dispensing Information Comments Morning Noon Evening Bedtime  
 cyclobenzaprine 10 mg tablet Commonly known as:  FLEXERIL Your last dose was: Your next dose is:    
   
   
 Dose:  10 mg Take 1 Tab by mouth three (3) times daily. Quantity:  30 Tab Refills:  0  
     
   
   
   
  
 senna-docusate 8.6-50 mg per tablet Commonly known as:  Cleavon Hope Your last dose was: Your next dose is:    
   
   
 Dose:  1 Tab Take 1 Tab by mouth two (2) times a day. Quantity:  30 Tab Refills:  0 ASK your doctor about these medications Dose & Instructions Dispensing Information Comments Morning Noon Evening Bedtime  
 ibuprofen 200 mg tablet Commonly known as:  MOTRIN Your last dose was: Your next dose is:    
   
   
 Dose:  600 mg Take 600 mg by mouth as needed for Pain. Refills:  0  
     
   
   
   
  
 OMEPRAZOLE PO Your last dose was: Your next dose is:    
   
   
 Dose:  1 Tab Take 1 Tab by mouth every morning. Refills:  0  
     
   
   
   
  
 oxyCODONE-acetaminophen 5-325 mg per tablet Commonly known as:  PERCOCET Your last dose was: Your next dose is:    
   
   
 Dose:  2 Tab Take 2 Tabs by mouth every four (4) hours as needed for Pain. Refills:  0 SEROquel 300 mg tablet Generic drug:  QUEtiapine Your last dose was: Your next dose is:    
   
   
 Dose:  300 mg Take 300 mg by mouth daily. Refills:  0 Where to Get Your Medications Information on where to get these meds will be given to you by the nurse or doctor. ! Ask your nurse or doctor about these medications  
  cyclobenzaprine 10 mg tablet  
 senna-docusate 8.6-50 mg per tablet

## 2017-09-20 NOTE — IP AVS SNAPSHOT
[address] 
[phone] Patient: Issa Schwab MRN: [id number]:[id number] You are allergic to the following Allergen Reactions Hydrocodone Itching Recent Documentation Height Weight BMI Smoking Status 1.803 m 77.1 kg 23.71 kg/m2 Current Every Day Smoker Emergency Contacts Name Discharge Info Relation Home Work Mobile None,None N/A  AT THIS TIME [6] Friend [5] About your hospitalization You were admitted on:  September 20, 2017 You last received care in the:  [address] [place] [address] You were discharged on:  September 24, 2017 Unit phone number:  [phone] Why you were hospitalized Your primary diagnosis was:  Not on File Your diagnoses also included:  Spinal Stenosis Providers Seen During Your Hospitalizations Provider Role Specialty Primary office phone Lavinia Gallegos MD Attending Provider Orthopedic Surgery [phone] Your Primary Care Physician (PCP) Primary Care Physician Office Phone Office Fax [place], [facility] [phone] Follow-up Information Follow up With Details Comments Contact Info AT HOME CARE  Referred for home health services. Service to begin the day after discharge. Please call the agency if no contact by 12 noon the day after discharge. [address] [address] 
[phone] Anshul Oates MD   [address] [address] 
[phone] Current Discharge Medication List  
  
START taking these medications Dose & Instructions Dispensing Information Comments Morning Noon Evening Bedtime  
 cyclobenzaprine 10 mg tablet Commonly known as:  FLEXERIL Your last dose was: Your next dose is:    
   
   
 Dose:  10 mg Take 1 Tab by mouth three (3) times daily. Quantity:  30 Tab Refills:  0 senna-docusate 8.6-50 mg per tablet Commonly known as:  Xiomy Pretty Your last dose was: Your next dose is:    
   
   
 Dose:  1 Tab Take 1 Tab by mouth two (2) times a day. Quantity:  30 Tab Refills:  0 ASK your doctor about these medications Dose & Instructions Dispensing Information Comments Morning Noon Evening Bedtime  
 ibuprofen 200 mg tablet Commonly known as:  MOTRIN Your last dose was: Your next dose is:    
   
   
 Dose:  600 mg Take 600 mg by mouth as needed for Pain. Refills:  0  
     
   
   
   
  
 OMEPRAZOLE PO Your last dose was: Your next dose is:    
   
   
 Dose:  1 Tab Take 1 Tab by mouth every morning. Refills:  0  
     
   
   
   
  
 oxyCODONE-acetaminophen 5-325 mg per tablet Commonly known as:  PERCOCET Your last dose was: Your next dose is:    
   
   
 Dose:  2 Tab Take 2 Tabs by mouth every four (4) hours as needed for Pain. Refills:  0 SEROquel 300 mg tablet Generic drug:  QUEtiapine Your last dose was: Your next dose is:    
   
   
 Dose:  300 mg Take 300 mg by mouth daily. Refills:  0 Where to Get Your Medications Information on where to get these meds will be given to you by the nurse or doctor. ! Ask your nurse or doctor about these medications  
  cyclobenzaprine 10 mg tablet  
 senna-docusate 8.6-50 mg per tablet Discharge Instructions After Hospital Care Plan:  Discharge Instructions Lumbar Fusion Surgery Dr. Enrrique Jaffe Patient Name Wallene Leyden) Date of procedure: 9/20/17 Procedure (Procedure(s): LUMBAR LAMINECTOMY L2-S1, POSTERIOR SPINAL FUSION L4-S1) Surgeon (Surgeon(s) and Role: Norris Boone MD - Primary)   PCP: 
 
Follow up appointments 
-follow up with Dr. Enrrique Jaffe in 2 weeks.   Call [phone], ext [phone] to make an appointment as soon as you get home from the hospital. 
 
[address]: _________________________   phone: _______________________ The agency will contact you to arrange dates/times for visits. Please call them if you do not hear from them within 24 hours after you are discharged When to call your Orthopaedic Surgeon: 
-Signs of infection-if your incision is red; continues to have drainage; drainage has a foul odor or if you have a persistent fever over 101 degrees for 24 hours 
-nausea or vomiting, severe headache 
-loss of bowel or bladder function, inability to urinate 
-changes in sensation in your arms or legs (numbness, tingling, loss of color) 
-increased weakness-greater than before your surgery 
-severe pain or pain not relieved by medications 
-Signs of a blood clot in your leg-calf pain, tenderness, redness, swelling of lower leg When to call your Primary Care Physician: 
-Concerns about medical conditions such as diabetes, high blood pressure, asthma, congestive heart failure 
-Call if blood sugars are elevated, persistent headache or dizziness, coughing or congestion, constipation or diarrhea, burning with urination, abnormal heart rate When to call 911and go to the nearest emergency room 
-acute onset of chest pain, shortness of breath, difficulty breathing Activity - Your only exercise should be walking. Start with short frequent walks and increase your walking distance each day. 
-Limit the amount of time you sit to 20-30 minute intervals. Sitting for prolonged periods of time will be uncomfortable for you following surgery. 
-Do NOT lift anything over 5 pounds 
-Do NOT do any straining, twisting or bending 
-When you are in bed, you may lay on your back or on either side. Do NOT lie on your stomach Brace  if ordered by surgeon  
-If you have a back brace, you should wear your brace at all times when you are out of bed. Do not wear the brace while in bed or showering. 
-Remember to always wear a cotton t-shirt underneath your brace. 
-Do not bend or twist when your brace is off Driving 
-You may not drive or return to work until instructed by your physician. However, you may ride in the car for short periods of time. Incision Care Your incision has been closed with absorbable sutures and the Dermabond Prineo skin closure system. This is a combination of a mesh and a liquid adhesive that will assist with healing. The mesh is to remain on your incision for 2 weeks. A dry dressing (ABD and tape) will be placed over it and should be changed daily. Please make sure the person performing your dressing changes washes their hands before touching the dressing. You may take brief showers but do not run the water directly onto the wound. After your shower, remove your dressing and blot your incision dry with a clean, soft towel and replace the dry dressing. Do not allow the tape to come in contact with the mesh. Do not rub or apply any lotions or ointments to your incision site. Do not soak or scrub your wound. The mesh dressing will be removed during your two week follow-up appointment. If you experience drainage leaking from underneath the mesh or if it peels off before 2 weeks, please contact your orthopedic surgeons office. Showering 
-You may shower in approximately 4 days after your surgery.   
 
-Leave the dressing on during your shower without allowing the water to run over it. 
 
-Reminder- your brace can be removed while showering. Remember to not bend or twist while your brace is off.   
 
-Do not take a tub bath. Preventing blood clots 
-You have been given T.E.D. stockings to wear. Continue to wear these for 7 days after your discharge.   Put them on in the morning and take them off at night.   
 
-They are used to increase your circulation and prevent blood clots from forming in your legs Pain management 
-take pain medication as prescribed; decrease the amount you use as your pain lessens 
-avoid alcoholic beverages while taking pain medication 
-avoid NSAIDS (Aleve, Ibuprofen, Aspirin) until your surgeon approves it 
-Please be aware that many medications contain Tylenol. We do not want you to over medicate so please read the information below as a guide. Do not take more than 4 Grams of Tylenol in a 24 hour period. (There are 1000 milligrams in one Gram) Percocet contains 325 mg of Tylenol per tablet (do not take more than 12 tablets in 24 hours) Lortab contains 500 mg of Tylenol per tablet (do not take more than 8 tablets in 24 hours) Norco contains 325 mg of Tylenol per tablet (do not take more than 12 tablets in 24 hours). Diet 
-resume usual diet; drink plenty of fluids; eat foods high in fiber 
-It is important to have regular bowel movements. Pain medications may cause constipation. You may want to take a stool softener (such as Senokot-S or Colace) to prevent constipation. If constipation occurs, take a laxative (such as Dulcolax tablets, Milk of Magnesia, or a suppository). Laxatives should only be used if the above preventable measures have failed and you still have not had a bowel movement after three days Discharge Orders None Introducing [facility]! Abby Galindo introduces [employer] patient portal. Now you can access parts of your medical record, email your doctor's office, and request medication refills online. 1. In your internet browser, go to [email or web address] 2. Click on the First Time User? Click Here link in the Sign In box. You will see the New Member Sign Up page. 3. Enter your [employer] Access Code exactly as it appears below. You will not need to use this code after youve completed the sign-up process. If you do not sign up before the expiration date, you must request a new code. · [employer] Access Code: [email or web address] Expires: 11/28/2017 12:14 PM 
 
4. Enter the last four digits of your Social Security Number (xxxx) and Date of Birth (mm/dd/yyyy) as indicated and click Submit. You will be taken to the next sign-up page. 5. Create a [employer] ID. This will be your [employer] login ID and cannot be changed, so think of one that is secure and easy to remember. 6. Create a [employer] password. You can change your password at any time. 7. Enter your Password Reset Question and Answer. This can be used at a later time if you forget your password. 8. Enter your e-mail address. You will receive e-mail notification when new information is available in [address] 9. Click Sign Up. You can now view and download portions of your medical record. 10. Click the Download Summary menu link to download a portable copy of your medical information. If you have questions, please visit the Frequently Asked Questions section of the [employer] website. Remember, [employer] is NOT to be used for urgent needs. For medical emergencies, dial 911. Now available from your iPhone and Android! General Information Please provide this summary of care documentation to your next provider. Patient Signature:  ____________________________________________________________ Date:  ____________________________________________________________  
  
[place] [facility] Provider Signature:  ____________________________________________________________ Date:  ____________________________________________________________

## 2017-09-21 LAB
ALBUMIN SERPL-MCNC: 3.2 G/DL (ref 3.5–5)
ALBUMIN/GLOB SERPL: 0.9 {RATIO} (ref 1.1–2.2)
ALP SERPL-CCNC: 80 U/L (ref 45–117)
ALT SERPL-CCNC: 25 U/L (ref 12–78)
ANION GAP SERPL CALC-SCNC: 5 MMOL/L (ref 5–15)
AST SERPL-CCNC: 24 U/L (ref 15–37)
ATRIAL RATE: 53 BPM
BASOPHILS # BLD: 0 K/UL (ref 0–0.1)
BASOPHILS NFR BLD: 0 % (ref 0–1)
BILIRUB SERPL-MCNC: 0.7 MG/DL (ref 0.2–1)
BUN SERPL-MCNC: 14 MG/DL (ref 6–20)
BUN/CREAT SERPL: 12 (ref 12–20)
CALCIUM SERPL-MCNC: 8.4 MG/DL (ref 8.5–10.1)
CALCULATED P AXIS, ECG09: 55 DEGREES
CALCULATED R AXIS, ECG10: -26 DEGREES
CALCULATED T AXIS, ECG11: 41 DEGREES
CHLORIDE SERPL-SCNC: 99 MMOL/L (ref 97–108)
CO2 SERPL-SCNC: 31 MMOL/L (ref 21–32)
CREAT SERPL-MCNC: 1.13 MG/DL (ref 0.7–1.3)
DIAGNOSIS, 93000: NORMAL
DIFFERENTIAL METHOD BLD: ABNORMAL
EOSINOPHIL # BLD: 0 K/UL (ref 0–0.4)
EOSINOPHIL NFR BLD: 0 % (ref 0–7)
ERYTHROCYTE [DISTWIDTH] IN BLOOD BY AUTOMATED COUNT: 14.1 % (ref 11.5–14.5)
GLOBULIN SER CALC-MCNC: 3.4 G/DL (ref 2–4)
GLUCOSE SERPL-MCNC: 132 MG/DL (ref 65–100)
HCT VFR BLD AUTO: 41.2 % (ref 36.6–50.3)
HGB BLD-MCNC: 13.9 G/DL (ref 12.1–17)
LACTATE SERPL-SCNC: 0.8 MMOL/L (ref 0.4–2)
LYMPHOCYTES # BLD: 0.6 K/UL (ref 0.8–3.5)
LYMPHOCYTES NFR BLD: 8 % (ref 12–49)
MAGNESIUM SERPL-MCNC: 1.9 MG/DL (ref 1.6–2.4)
MCH RBC QN AUTO: 31.4 PG (ref 26–34)
MCHC RBC AUTO-ENTMCNC: 33.7 G/DL (ref 30–36.5)
MCV RBC AUTO: 93 FL (ref 80–99)
MONOCYTES # BLD: 0.4 K/UL (ref 0–1)
MONOCYTES NFR BLD: 5 % (ref 5–13)
NEUTS SEG # BLD: 6.1 K/UL (ref 1.8–8)
NEUTS SEG NFR BLD: 87 % (ref 32–75)
P-R INTERVAL, ECG05: 142 MS
PLATELET # BLD AUTO: 263 K/UL (ref 150–400)
POTASSIUM SERPL-SCNC: 4.2 MMOL/L (ref 3.5–5.1)
PROT SERPL-MCNC: 6.6 G/DL (ref 6.4–8.2)
Q-T INTERVAL, ECG07: 470 MS
QRS DURATION, ECG06: 86 MS
QTC CALCULATION (BEZET), ECG08: 441 MS
RBC # BLD AUTO: 4.43 M/UL (ref 4.1–5.7)
RBC MORPH BLD: ABNORMAL
SODIUM SERPL-SCNC: 135 MMOL/L (ref 136–145)
VENTRICULAR RATE, ECG03: 53 BPM
WBC # BLD AUTO: 7.1 K/UL (ref 4.1–11.1)

## 2017-09-21 PROCEDURE — 0SG00K1 FUSION OF LUMBAR VERTEBRAL JOINT WITH NONAUTOLOGOUS TISSUE SUBSTITUTE, POSTERIOR APPROACH, POSTERIOR COLUMN, OPEN APPROACH: ICD-10-PCS | Performed by: ORTHOPAEDIC SURGERY

## 2017-09-21 PROCEDURE — 80053 COMPREHEN METABOLIC PANEL: CPT | Performed by: FAMILY MEDICINE

## 2017-09-21 PROCEDURE — 36415 COLL VENOUS BLD VENIPUNCTURE: CPT | Performed by: FAMILY MEDICINE

## 2017-09-21 PROCEDURE — 97530 THERAPEUTIC ACTIVITIES: CPT

## 2017-09-21 PROCEDURE — 97161 PT EVAL LOW COMPLEX 20 MIN: CPT

## 2017-09-21 PROCEDURE — G8979 MOBILITY GOAL STATUS: HCPCS

## 2017-09-21 PROCEDURE — 74011250637 HC RX REV CODE- 250/637: Performed by: PHYSICIAN ASSISTANT

## 2017-09-21 PROCEDURE — 0SG30K1 FUSION OF LUMBOSACRAL JOINT WITH NONAUTOLOGOUS TISSUE SUBSTITUTE, POSTERIOR APPROACH, POSTERIOR COLUMN, OPEN APPROACH: ICD-10-PCS | Performed by: ORTHOPAEDIC SURGERY

## 2017-09-21 PROCEDURE — 83735 ASSAY OF MAGNESIUM: CPT | Performed by: FAMILY MEDICINE

## 2017-09-21 PROCEDURE — G8987 SELF CARE CURRENT STATUS: HCPCS

## 2017-09-21 PROCEDURE — 83605 ASSAY OF LACTIC ACID: CPT | Performed by: FAMILY MEDICINE

## 2017-09-21 PROCEDURE — 65660000000 HC RM CCU STEPDOWN

## 2017-09-21 PROCEDURE — 85025 COMPLETE CBC W/AUTO DIFF WBC: CPT | Performed by: FAMILY MEDICINE

## 2017-09-21 PROCEDURE — G8988 SELF CARE GOAL STATUS: HCPCS

## 2017-09-21 PROCEDURE — 97116 GAIT TRAINING THERAPY: CPT

## 2017-09-21 PROCEDURE — 93005 ELECTROCARDIOGRAM TRACING: CPT

## 2017-09-21 PROCEDURE — 74011250637 HC RX REV CODE- 250/637: Performed by: ORTHOPAEDIC SURGERY

## 2017-09-21 PROCEDURE — 97165 OT EVAL LOW COMPLEX 30 MIN: CPT

## 2017-09-21 PROCEDURE — G8978 MOBILITY CURRENT STATUS: HCPCS

## 2017-09-21 PROCEDURE — 74011250636 HC RX REV CODE- 250/636: Performed by: PHYSICIAN ASSISTANT

## 2017-09-21 RX ORDER — PANTOPRAZOLE SODIUM 40 MG/1
40 TABLET, DELAYED RELEASE ORAL DAILY
Status: DISCONTINUED | OUTPATIENT
Start: 2017-09-21 | End: 2017-09-21 | Stop reason: SDUPTHER

## 2017-09-21 RX ORDER — PHENOL/SODIUM PHENOLATE
20 AEROSOL, SPRAY (ML) MUCOUS MEMBRANE
Status: DISCONTINUED | OUTPATIENT
Start: 2017-09-21 | End: 2017-09-24 | Stop reason: HOSPADM

## 2017-09-21 RX ORDER — CALCIUM CARBONATE 200(500)MG
200 TABLET,CHEWABLE ORAL
Status: DISCONTINUED | OUTPATIENT
Start: 2017-09-21 | End: 2017-09-24 | Stop reason: HOSPADM

## 2017-09-21 RX ADMIN — SODIUM CHLORIDE 125 ML/HR: 900 INJECTION, SOLUTION INTRAVENOUS at 18:23

## 2017-09-21 RX ADMIN — ACETAMINOPHEN 650 MG: 325 TABLET, FILM COATED ORAL at 12:50

## 2017-09-21 RX ADMIN — DIAZEPAM 5 MG: 5 TABLET ORAL at 14:56

## 2017-09-21 RX ADMIN — ACETAMINOPHEN 650 MG: 325 TABLET, FILM COATED ORAL at 23:36

## 2017-09-21 RX ADMIN — ACETAMINOPHEN 650 MG: 325 TABLET, FILM COATED ORAL at 17:19

## 2017-09-21 RX ADMIN — DOCUSATE SODIUM AND SENNOSIDES 1 TABLET: 8.6; 5 TABLET, FILM COATED ORAL at 17:19

## 2017-09-21 RX ADMIN — OXYCODONE HYDROCHLORIDE 10 MG: 5 TABLET ORAL at 19:11

## 2017-09-21 RX ADMIN — CALCIUM CARBONATE (ANTACID) CHEW TAB 500 MG 200 MG: 500 CHEW TAB at 07:28

## 2017-09-21 RX ADMIN — Medication 10 ML: at 22:38

## 2017-09-21 RX ADMIN — CEFAZOLIN 2 G: 1 INJECTION, POWDER, FOR SOLUTION INTRAMUSCULAR; INTRAVENOUS; PARENTERAL at 03:41

## 2017-09-21 RX ADMIN — OXYCODONE HYDROCHLORIDE 10 MG: 5 TABLET ORAL at 16:09

## 2017-09-21 RX ADMIN — CALCIUM CARBONATE (ANTACID) CHEW TAB 500 MG 200 MG: 500 CHEW TAB at 17:19

## 2017-09-21 RX ADMIN — Medication 20 MG: at 12:53

## 2017-09-21 RX ADMIN — OXYCODONE HYDROCHLORIDE 10 MG: 5 TABLET ORAL at 12:50

## 2017-09-21 RX ADMIN — DOCUSATE SODIUM AND SENNOSIDES 1 TABLET: 8.6; 5 TABLET, FILM COATED ORAL at 09:46

## 2017-09-21 RX ADMIN — CALCIUM CARBONATE (ANTACID) CHEW TAB 500 MG 200 MG: 500 CHEW TAB at 12:50

## 2017-09-21 RX ADMIN — PANTOPRAZOLE SODIUM 40 MG: 40 TABLET, DELAYED RELEASE ORAL at 05:51

## 2017-09-21 RX ADMIN — OXYCODONE HYDROCHLORIDE 10 MG: 5 TABLET ORAL at 23:36

## 2017-09-21 RX ADMIN — Medication 10 ML: at 05:52

## 2017-09-21 RX ADMIN — CEFAZOLIN 2 G: 1 INJECTION, POWDER, FOR SOLUTION INTRAMUSCULAR; INTRAVENOUS; PARENTERAL at 09:46

## 2017-09-21 RX ADMIN — ACETAMINOPHEN 650 MG: 325 TABLET, FILM COATED ORAL at 05:51

## 2017-09-21 RX ADMIN — OXYCODONE HYDROCHLORIDE 10 MG: 5 TABLET ORAL at 09:45

## 2017-09-21 NOTE — OP NOTES
[address] [place] [facility] [place] [address], [address]   OP NOTE       Name:  Jimmie Kamara   MR#:  [id number]   :  1960   Account #:  [de-identified]    Surgery Date:  2017   Date of Adm:  2017       PREOPERATIVE DIAGNOSES   1. Severe lumbar spinal stenosis, L2-S1.   2. Bilateral neural foraminal stenosis, L4-5, L5-S1. POSTOPERATIVE DIAGNOSES   1. Severe lumbar spinal stenosis, L2-S1.   2. Bilateral neural foraminal stenosis, L4-5, L5-S1. PROCEDURES PERFORMED   1. Lumbar laminectomy, L2-S1; bilateral decompression of the L2, L3,   L4, L5 and S1 nerve roots. 2. Bilateral lateral fusion, L4-S1, using Globus spinal instrumentation   supplemented with cancellous allograft. SURGEON: NATALIE Roberts MD    ESTIMATED BLOOD LOSS:     SPECIMENS REMOVED:     ANESTHESIA:  General.    INDICATIONS: A 41-year-old gentleman presented with a case of   severe lumbar spinal stenosis. Severe limitation of function related to   activities of standing and walking as well as subjective sense of   weakness. Has imaging study demonstrating severe canal narrowing   related to degenerative change, but also an accompanying epidural   lipomatosis; he has severe bilateral neural foraminal narrowing, L4-L5   and L5-S1. Given the extent of his pain, dysfunction and his findings   clinically and radiologically, a lumbar decompression and stabilization   procedure offered. Potential benefits and complications reviewed. DESCRIPTION OF PROCEDURE: The patient was brought to the   operating room in the supine position, general anesthetic administered. The patient placed in prone position on the Augie frame. Low back   region prepped and draped in normal fashion. Preoperative antibiotics   administered. Thigh-high MEGAN hose and sequential pumps applied to   the patient's lower extremities. An incision was made extending from the upper border of L2 down to   the sacrum.  Subcutaneous tissues then divided in line with the incision   down to the level of the fascia. The fascia incised in the midline and   subperiosteal dissection completed over the spinous process and   laminar surfaces bilaterally. X-ray was eventually taken to verify the   level. Complete laminectomy of L2, L3, L4, L5 completed. Partial   laminectomy of S1 completed. The L2, L3, L4, L5 and S1 nerve roots   identified and decompressed starting in the lateral recess zone and   followed well into the neural foraminal zone. A partial medial   facetectomy completed at each level as required. Wide foraminotomies   over the L4 and L5 roots in particular. Once completed, each of the   roots felt to be free and mobile and a large ball probe passed well into   the neural foraminal zone without difficulty. I then placed drill holes to   the pedicles at L4, L5 and S1. Pedicles were probed in a routine   fashion. Pins were placed and pedicles were marked, and x-ray was   taken to verify position and direction. Cancellous allograft 40 mL   utilized and packed over decorticated surfaces to include the   transverse processes, the remaining portion of the lamina and the   facet joints. Screws measuring 6.5 to 7.5 mm in diameter, 45 mm in   length with good purchase. These were connected with the lucille that   was appropriately contoured, secured using the locking cap and final    device. Slight amount of distraction applied at the   lumbosacral junction. The wound had been irrigated earlier. Drain was   placed. Fascia closed using #1 Vicryl. Subcutaneous tissues and skin   closed using 2 and 3-0 Vicryl. The patient awoke from the anesthetic,   extubated, taken to recovery in stable condition.         Tracy Officer, MD BRIAN / LORENZO   D:  09/20/2017   20:18   T:  09/21/2017   01:19   Job #:  [id number]

## 2017-09-21 NOTE — PROGRESS NOTES
Brief follow up by hospitalist    Consulted last pm for bradycardia. History of PAF and s/p L2-S1 lumbar laminectomy. Patient feels frustrated that I am in the room to check on his bradycardia that he has had most of his life. I explained that we were just here to follow up and that his heart rate had been better today. We will sign off as nothing further to add and patient is at baseline. Thank you for allowing us to be involved in the care of this patient. Feel free to re consult if needed.     Zay Saldaña [facility]  [phone]

## 2017-09-21 NOTE — PROGRESS NOTES
Problem: Self Care Deficits Care Plan (Adult)  Goal: *Acute Goals and Plan of Care (Insert Text)  Occupational Therapy Goals  Initiated 9/21/2017  1. Patient will perform lower body dressing with modified independence using AE PRN within 7 days. 2. Patient will perform toileting with modified independence using most appropriate DME within 7 days. 3. Patient will perform gathering ADLs 4/4 high and low without cues precautions at modified independence within 7 days. 4. Patient will don/doff back brace at modified independence within 7 days. 5. Patient will verbalize/demonstrate 3/3 back precautions during ADL tasks without cues within 7 days. OCCUPATIONAL THERAPY EVALUATION  Patient: Annemarie De La Fuente (8 y.o. male)  Date: 9/21/2017  Primary Diagnosis: SPINAL STENOSIS L2-S1  Spinal stenosis  Procedure(s) (LRB):  LUMBAR LAMINECTOMY L2-S1, POSTERIOR SPINAL FUSION L4-S1 (N/A) 1 Day Post-Op   Precautions:   Back, Fall      ASSESSMENT :  Based on the objective data described below, the patient presents with independent to min A upper body ADLs, mod to max A lower body ADLs, sit<>stand min A, bed mobility mod A. ADLs limited by typical s/p back sx and back precautions. However, patient limited by increased pain, L hip pain still had pre and post-op, standing tolerance, standing balance, LE ROM. Pending progression may need rehab, if progresses then discharge home with [address] [place] [address]. Recommend with nursing patient to complete as able in order to maintain strength, endurance and independence: ADLs with supervision/setup, OOB to chair 3x/day and mobilizing to the bathroom for toileting with 1 assist. Thank you for your assistance. Patient will benefit from skilled intervention to address the above impairments.   Patients rehabilitation potential is considered to be Good  Factors which may influence rehabilitation potential include:   [X]             None noted  [ ]             Mental ability/status  [ ] Medical condition  [ ]             Home/family situation and support systems  [ ]             Safety awareness  [ ]             Pain tolerance/management  [ ]             Other:        PLAN :  Recommendations and Planned Interventions:  [X]               Self Care Training                  [X]        Therapeutic Activities  [X]               Functional Mobility Training    [ ]        Cognitive Retraining  [X]               Therapeutic Exercises           [X]        Endurance Activities  [X]               Balance Training                   [ ]        Neuromuscular Re-Education  [ ]               Visual/Perceptual Training     [X]   Home Safety Training  [X]               Patient Education                 [X]        Family Training/Education  [ ]               Other (comment):     Frequency/Duration: Patient will be followed by occupational therapy 5 times a week to address goals. Discharge Recommendations: Home Health and To Be Determined  Further Equipment Recommendations for Discharge: TBD       SUBJECTIVE:   Patient stated I just can not, I hurt.       OBJECTIVE DATA SUMMARY:   HISTORY:   Past Medical History:   Diagnosis Date    Arrhythmia       hx AF    Arthritis      Chronic kidney disease       STONES    Chronic pain       both knees, hips, left shoulder    GERD (gastroesophageal reflux disease)       well controlled    Psychiatric disorder       bipolar disorder     Past Surgical History:   Procedure Laterality Date    HX CATARACT REMOVAL        HX GI         COLONOSCOPY    HX HEENT Left       DETACHED RETINA    HX KNEE ARTHROSCOPY         bilateral    HX ORTHOPAEDIC         right ankle reconstruction with hardware    HX ORTHOPAEDIC   2003     left hand reconstruction (trauma)    HX ORTHOPAEDIC         R HIP REPLACED    HX ORTHOPAEDIC         BOTH SHOULDER SURGERY        Prior Level of Function/Home Situation: independent with all ADLs, recently modified independence increased time due to pain \"barely walking\". Patient not working recently 2* pain, cuts grass riding . Expanded or extensive additional review of patient history:      Home Situation  Home Environment: Private residence  # Steps to Enter: 2  Rails to Enter: No  One/Two Story Residence: One story  Living Alone: Yes  Support Systems: Friends \ neighbors  Patient Expects to be Discharged to[de-identified] Private residence  Current DME Used/Available at Home: Cane, straight  Tub or Shower Type: Tub/Shower combination  [X]  Right hand dominant             [ ]  Left hand dominant     EXAMINATION OF PERFORMANCE DEFICITS:  Cognitive/Behavioral Status:  Neurologic State: Alert  Orientation Level: Oriented X4  Cognition: Appropriate decision making; Appropriate for age attention/concentration; Appropriate safety awareness  Perception: Appears intact  Perseveration: No perseveration noted  Safety/Judgement: Awareness of environment;Good awareness of safety precautions     Skin: intact bandage, hemovac, PIV     Edema: intact as seen     Hearing: Auditory  Auditory Impairment: None     Vision/Perceptual:                           Acuity: Impaired near vision    Corrective Lenses: Reading glasses     Range of Motion:  Shoulder flexion ~160*, elbows-digits; poor tailor sitting  AROM: Generally decreased, functional  PROM: Generally decreased, functional                       Strength:     Strength: Generally decreased, functional                 Coordination:  Coordination: Within functional limits  Fine Motor Skills-Upper: Left Intact; Right Intact    Gross Motor Skills-Upper: Left Intact; Right Intact     Tone & Sensation:        Sensation: Intact                       Balance:  Sitting: Intact  Standing: Impaired  Standing - Static: Fair  Standing - Dynamic : Good     Functional Mobility and Transfers for ADLs:  Bed Mobility:  Rolling: Contact guard assistance  Supine to Sit: Contact guard assistance  Sit to Supine: moderate A physical A B LEs  Scooting: Contact guard assistance     Transfers:  Sit to Stand: Contact guard assistance  Stand to Sit: Contact guard assistance  Toilet Transfer : Minimum assistance (infer from chair and pain)  Tub Transfer: Total assistance (not safe at this time)     ADL Assessment:  Feeding: Independent     Oral Facial Hygiene/Grooming: Supervision infer from sitting balance, setup on beside tray     Bathing: Moderate assistanceinfer      Upper Body Dressing: Minimum assistance Patient demonstrated to don/doff velcro on brace using dominant side, keeping non-dominant side intact     Lower Body Dressing: Maximum assistance poor tailor sitting, fair standing balance, poor functional reach to backside     Toileting: Maximum assistance mock, fair standing balance, poor functional reach to backside                 ADL Intervention and task modifications:     Patient instructed and indicated understanding the benefits of maintaining activity tolerance, functional mobility, and independence with self care tasks during acute stay  to ensure safe return home and to baseline. Encouraged patient to increase frequency and duration OOB, not sitting longer than 30 mins without marching/walking with staff, be out of bed for all meals, perform daily ADLs (as approved by RN/MD regarding bathing etc), and performing functional mobility to/from bathroom. Patient instructed while supine hip ER stretch and hold 10 seconds to increase ROM in prep for lower body ADLs daily with. Dressing lower body: Patient instructed to don brace first and on the benefits to remain seated to don all clothing to increase independence with precautions and pain management. Toileting: Patient instructed on the benefits of using flushable wet wipes and toilet tongs if decreased reach or pain for nory care. Also, the benefits of a reacher to aid in clothing management.     Standing:   Patient instructed to increase amount of time standing in order to increase independence and tolerance with ADLs. Cognitive Retraining  Safety/Judgement: Awareness of environment;Good awareness of safety precautions     Therapeutic Exercise:  Patient instructed on the benefits shoulder flexion exercises to increase independence with ADLs, active ROM, and strength of spine. Patient demonstrated  5 reps and 1 set(s) while sitting with supervision. Patient instructed and demonstrated techniques of activating abdomen and pelvic floor muscles. Patient instructed and indicated understanding how to progress reps, sets, against gravity to then working up to 5 lbs until surgeon clears for increased weight, supine to sitting and then standing. Can use household items as weights. Functional Measure:  Barthel Index:      Bathin  Bladder: 10 (mullen cath )  Bowels: 10  Groomin  Dressin  Feeding: 10  Mobility: 10  Stairs: 0  Toilet Use: 0  Transfer (Bed to Chair and Back): 10  Total: 60         Barthel and G-code impairment scale:  Percentage of impairment CH  0% CI  1-19% CJ  20-39% CK  40-59% CL  60-79% CM  80-99% CN  100%   Barthel Score 0-100 100 99-80 79-60 59-40 20-39 1-19    0   Barthel Score 0-20 20 17-19 13-16 9-12 5-8 1-4 0      The Barthel ADL Index: Guidelines  1. The index should be used as a record of what a patient does, not as a record of what a patient could do. 2. The main aim is to establish degree of independence from any help, physical or verbal, however minor and for whatever reason. 3. The need for supervision renders the patient not independent. 4. A patient's performance should be established using the best available evidence. Asking the patient, friends/relatives and nurses are the usual sources, but direct observation and common sense are also important. However direct testing is not needed. 5. Usually the patient's performance over the preceding 24-48 hours is important, but occasionally longer periods will be relevant.   6. Middle categories imply that the patient supplies over 50 per cent of the effort. 7. Use of aids to be independent is allowed. Corrie Prince., Barthel, D.W. ([id number]). Functional evaluation: the Barthel Index. [address] (14)2. SHAYY Benitez Allene Guardian., Maríaeduin Feliz. Phil, [address] (1999). Measuring the change indisability after inpatient rehabilitation; comparison of the responsiveness of the Barthel Index and Functional [place] Measure. Journal of Neurology, Neurosurgery, and Psychiatry, 66(4), 695-373. ADDIS Fitch, CONG Goyal, & Juan Bence, M.A. (2004.) Assessment of post-stroke quality of life in cost-effectiveness studies: The usefulness of the Barthel Index and the EuroQoL-5D. Quality of Life Research, 13, 418-42            G codes: In compliance with CMSs Claims Based Outcome Reporting, the following G-code set was chosen for this patient based on their primary functional limitation being treated: The outcome measure chosen to determine the severity of the functional limitation was the Barthel index with a score of 60/100 which was correlated with the impairment scale. · Self Care:               - CURRENT STATUS:    CJ - 20%-39% impaired, limited or restricted               - GOAL STATUS:           CH - 0% impaired, limited or restricted               - D/C STATUS:                       ---------------To be determined---------------        Pain:  Pain Scale 1: Numeric (0 - 10)  Pain Intensity 1: 3  Pain Location 1: Back  Pain Orientation 1: Lower  Pain Description 1: Aching  Pain Intervention(s) 1: Medication (see MAR)  Activity Tolerance:   Bradycardia with hx of a-fib  Please refer to the flowsheet for vital signs taken during this treatment.   After treatment:   [ ] Patient left in no apparent distress sitting up in chair  [X] Patient left in no apparent distress in bed  [X] Call bell left within reach  [X] Nursing notified  [ ] Caregiver present  [ ] Bed alarm activated      COMMUNICATION/EDUCATION:   The patients plan of care was discussed with: Physical Therapist and Registered Nurse.  [X] Home safety education was provided and the patient/caregiver indicated understanding. [X] Patient/family have participated as able in goal setting and plan of care. [X] Patient/family agree to work toward stated goals and plan of care. [ ] Patient understands intent and goals of therapy, but is neutral about his/her participation. [ ] Patient is unable to participate in goal setting and plan of care. This patients plan of care is appropriate for delegation to [facility].      Thank you for this referral.  Jeanenne Kocher  Time Calculation: 29 mins

## 2017-09-21 NOTE — PERIOP NOTES
TRANSFER - OUT REPORT:    Verbal report given to CAMILO Michael(name) on Samanta Dad  being transferred to 543(unit) for routine post - op       Report consisted of patients Situation, Background, Assessment and   Recommendations(SBAR). Time Pre op antibiotic given:1829  Anesthesia Stop time: 2113      Information from the following report(s) SBAR, OR Summary, Intake/Output, MAR and Cardiac Rhythm NSR. was reviewed with the receiving nurse. Opportunity for questions and clarification was provided. Is the patient on 02? YES       L/Min 2    Is the patient on a monitor? NO    Is the nurse transporting with the patient? NO    Surgical Waiting Area notified of patient's transfer from PACU? YES      The following personal items collected during your admission accompanied patient upon transfer:   Dental Appliance: Dental Appliances: None  Vision:    Hearing Aid:    Jewelry:    Clothing: Clothing: With patient  Other Valuables:  Other Valuables: Brace, With patient  Valuables sent to safe:

## 2017-09-21 NOTE — CONSULTS
.                                                                        Medical Consultation Report    Patient:  Gretta Moore  MRN:  [id number]  YOB: 1960  Age:  60 y.o. Primary care provider:  Mariam Alcaraz MD    Date of admission:  9/20/2017    Date of consultation:  9/21/2017    Requesting physician:  Dr. Sam Escobedo    Reason for consultation:   bradycardia                               History of present illness  Gretta Moore is a 60 y.o. male with past medical history of chronic pain, GERD, bipolar disorder, chronic kidney disease (CKD), paroxysmal atrial fibrillation, arthritis presented for admission on 9/20/2017 with diagnosis of spinal stenosis. Patient underwent lumbar laminectomy of L2-S1 with posterior spinal stenosis L4-S1. Tonight, patient is seen at the request of attending service for evaluation of bradycardia. Nurse states that patient has a bedside pulse oximeter device. Heart rate reportedly has been recorded in the 30s to 40s. Heart reportedly initially was increased with change in position but then heart started staying less than 50s. Patient complains of chronic substernal and epigastric pain, which he attributes to known hiatal hernia, characterized a \"burning\", without specific aggravating/ alleviating factors. Patient otherwise is asymptomatic. Patient has no new onset complaints of dizziness, lightheadedness, syncope, loss of consciousness, headache, neck pain, visual disturbance, numbness, paresthesias, focal weakness, shortness of breath, cough, congestion, palpitations, nausea, vomiting, diarrhea, calf pain, increased leg swelling/ edema, fever, chills. Patient notes that his bradycardia is not a new findings. He notes that he has been told that he has low heart rate in the \"30s. All my life\".   Patient reports that his bradycardic episodes usually surprise physicians/ staff everytime he is goes to the hospital. Impression/Recomendations  1. Asymptomatic bradycardia. HR had been profoundly low but patient has no acute cardiac related symptoms. Ordered 2nd EKG to obtain better quality and baseline labs pending. Place order for remote telemetry. Patient notes that as an outpatient he has never had any symptoms associated the bradycardia over the years. .     2. Tobacco abuse. Strongly encouraged smoking cessation. 3.  Marijuana abuse. Strongly encouraged drug cessation. 4.  History of paroxysmal afib. Patient reports having one episode 30 years ago without recurrence. 5.  GERD with history of hiatal hernia. Protonix already ordered. 6.  CKD. Check metabolic/ renal panel. 7.  Acute chest pain. Likely related to GI chronic symptoms with GERD. Will order troponin level. Continue telemetry monitoring. 8.  DVT prophylaxis. Thank you very much for allowing us to participate in the care of this pleasant patient. The hospitalist service will continue to follow the patient's medical progress along with you. Joseph Elizalde MD   Hospitalist    Past Medical History:   Diagnosis Date    Arrhythmia     hx AF    Arthritis     Chronic kidney disease     STONES    Chronic pain     both knees, hips, left shoulder    GERD (gastroesophageal reflux disease)     well controlled    Psychiatric disorder     bipolar disorder      Past Surgical History:   Procedure Laterality Date    HX CATARACT REMOVAL      HX GI      COLONOSCOPY    HX HEENT Left     DETACHED RETINA    HX KNEE ARTHROSCOPY      bilateral    HX ORTHOPAEDIC      right ankle reconstruction with hardware    HX ORTHOPAEDIC  2003    left hand reconstruction (trauma)    HX ORTHOPAEDIC      R HIP REPLACED    HX ORTHOPAEDIC      BOTH SHOULDER SURGERY        Prior to Admission medications    Medication Sig Start Date End Date Taking?  Authorizing Provider   oxyCODONE-acetaminophen (PERCOCET) 5-325 mg per tablet Take 2 Tabs by mouth every four (4) hours as needed for Pain. Yes Historical Provider   OMEPRAZOLE PO Take 1 Tab by mouth every morning. Yes Historical Provider   QUEtiapine (SEROQUEL) 300 mg tablet Take 300 mg by mouth daily.    Yes Historical Provider     Current Facility-Administered Medications   Medication Dose Route Frequency    sodium chloride (NS) flush 5-10 mL  5-10 mL IntraVENous Q8H    sodium chloride (NS) flush 5-10 mL  5-10 mL IntraVENous PRN    0.9% sodium chloride infusion  125 mL/hr IntraVENous CONTINUOUS    sodium chloride (NS) flush 5-10 mL  5-10 mL IntraVENous Q8H    sodium chloride (NS) flush 5-10 mL  5-10 mL IntraVENous PRN    naloxone (NARCAN) injection 0.4 mg  0.4 mg IntraVENous PRN    senna-docusate (PERICOLACE) 8.6-50 mg per tablet 1 Tab  1 Tab Oral BID    [START ON 9/22/2017] bisacodyl (DULCOLAX) suppository 10 mg  10 mg Rectal DAILY PRN    morphine (PF)  mg/30 ml   IntraVENous TITRATE    acetaminophen (TYLENOL) tablet 650 mg  650 mg Oral Q6H    oxyCODONE IR (ROXICODONE) tablet 5 mg  5 mg Oral Q3H PRN    oxyCODONE IR (ROXICODONE) tablet 10 mg  10 mg Oral Q3H PRN    ceFAZolin in 0.9% NS (ANCEF) IVPB soln 2 g  2 g IntraVENous Q8H    ondansetron (ZOFRAN) injection 4 mg  4 mg IntraVENous Q4H PRN    diphenhydrAMINE (BENADRYL) injection 12.5 mg  12.5 mg IntraVENous Q6H PRN    diazePAM (VALIUM) tablet 5 mg  5 mg Oral Q6H PRN    pantoprazole (PROTONIX) tablet 40 mg  40 mg Oral DAILY    QUEtiapine (SEROquel) tablet 300 mg  300 mg Oral DAILY     Allergies   Allergen Reactions    Hydrocodone Itching      Family History   Problem Relation Age of Onset    Lung Disease Mother     Cancer Father      THROAT        Social history    Alcohol history  x  admits to 2 beers / per day             History   Smoking Status    Current Every Day Smoker    Packs/day: 1.50    Years: 45.00   Smokeless Tobacco    Never Used       Review of systems  I performed a ten systems review; pertinent positives were as noted in HPI, otherwise negative. Physical Examination   Visit Vitals    BP (!) 139/97    Pulse 66    Temp 97.6 °F (36.4 °C)    Resp 16    Ht 5' 11\" (1.803 m)    Wt 77.1 kg (169 lb 15.6 oz)    SpO2 100%    BMI 23.71 kg/m2       O2 Flow Rate (L/min): 2 l/min   O2 Device: Nasal cannula    General:  Patient in no acute respiratory distress   Head:  Atraumatic   Eyes:  Conjunctivae/corneas clear. E/N/M/T: Clear oropharynx   Neck: No JVD   Lungs:   Symmetrical chest expansion and respiratory effort  Clear to auscultation bilaterally   Chest wall:  No tenderness or deformity   Heart:  Regular rate and rhythm   Sounds normal; no murmur, click, rub or gallop   Abdomen:   Soft, no tenderness  No rebound, guarding, or rigidity  Non-distended  Bowel sounds normal  No masses or hepatosplenomegaly  No hernias present   Back: Dressings in place   Extremities: Extremities normal, atraumatic  No cyanosis/ edema     Pulses 2+ radial/ 1+ DP bilateral pulses   Skin: No rashes or ulcers  Warm/ dry   Musculo-      skeletal: Gait not tested  No calf tenderness   Neuro: GCS 15. Moves all extremities x 4. No slurred speech. No facial droop. Sensation grossly intact. Psych: Alert, oriented x 3           Data Review    12 lead EKG: sinus bradycardia with sinus arrhythmia at 53 bpm         No results for input(s): WBC, HGB, HCT, PLT, HGBEXT, HCTEXT, PLTEXT in the last 72 hours. No results for input(s): NA, K, CL, CO2, GLU, BUN, CREA, CA, MG, PHOS, ALB, TBIL, TBILI, SGOT, ALT, INR in the last 72 hours.     No lab exists for component: INREXT

## 2017-09-21 NOTE — PROGRESS NOTES
Problem: Mobility Impaired (Adult and Pediatric)  Goal: *Acute Goals and Plan of Care (Insert Text)  Physical Therapy Goals    Initiated 9/21/2017  1. Patient will move from supine to sit and sit to supine , scoot up and down and roll side to side in bed with modified independence within 4 days. 2. Patient will perform sit to stand with modified independence within 4 days. 3. Patient will ambulate with modified independence for 150 feet with the least restrictive device within 4 days. 4. Patient will ascend/descend 2 stairs with no handrail(s) with modified independence within 4 days. 5. Patient will verbalize and demonstrate understanding of spinal precautions (No bending, lifting greater than 5 lbs, or twisting; log-roll technique; frequent repositioning as instructed) within 4 days. PHYSICAL THERAPY EVALUATION  Patient: Judge Pineda (15 y.o. male)  Date: 9/21/2017  Primary Diagnosis: SPINAL STENOSIS L2-S1  Spinal stenosis  Procedure(s) (LRB):  LUMBAR LAMINECTOMY L2-S1, POSTERIOR SPINAL FUSION L4-S1 (N/A) 1 Day Post-Op   Precautions:   Back, Fall      ASSESSMENT :  Based on the objective data described below, the patient presents with decreased functional independence compared to baseline with associated decreased ROM, decreased strength, impaired balance, decreased tolerance to activity, and impaired gait secondary to posterior spinal fusion POD 1. Chart reviewed and RN cleared patient for mobility. Patient was agreeable to participate and was received in bed reporting elevated pain/soreness. BP remained stable throughout session. PT educated patient on BLT and log roll technique, patient demonstrated proper form throughout. All mobility, transfers, and standing activities required CGA to complete. During session patient reported some nausea and declined any further mobility at end of session requiring to secure TLSO. PT planning to secure TLSO during PM session and ambulating into hallway.  All needs were placed within reach and RN notified of patient's status. PT recommending HHPT. Patient will benefit from skilled intervention to address the above impairments. Patients rehabilitation potential is considered to be Good  Factors which may influence rehabilitation potential include:   [ ]         None noted  [ ]         Mental ability/status  [ ]         Medical condition  [ ]         Home/family situation and support systems  [ ]         Safety awareness  [X]         Pain tolerance/management  [ ]         Other:        PLAN :  Recommendations and Planned Interventions:  [X]           Bed Mobility Training             [X]    Neuromuscular Re-Education  [X]           Transfer Training                   [ ]    Orthotic/Prosthetic Training  [X]           Gait Training                         [ ]    Modalities  [X]           Therapeutic Exercises           [ ]    Edema Management/Control  [X]           Therapeutic Activities            [X]    Patient and Family Training/Education  [ ]           Other (comment):     Frequency/Duration: Patient will be followed by physical therapy  twice daily to address goals. Discharge Recommendations: Home Health  Further Equipment Recommendations for Discharge: TBD       SUBJECTIVE:   Patient stated Standing was painful but it actually felt better than I thought it would.       OBJECTIVE DATA SUMMARY:   HISTORY:    Past Medical History:   Diagnosis Date    Arrhythmia       hx AF    Arthritis      Chronic kidney disease       STONES    Chronic pain       both knees, hips, left shoulder    GERD (gastroesophageal reflux disease)       well controlled    Psychiatric disorder       bipolar disorder     Past Surgical History:   Procedure Laterality Date    HX CATARACT REMOVAL        HX GI         COLONOSCOPY    HX HEENT Left       DETACHED RETINA    HX KNEE ARTHROSCOPY         bilateral    HX ORTHOPAEDIC         right ankle reconstruction with hardware    HX ORTHOPAEDIC 2003     left hand reconstruction (trauma)    HX ORTHOPAEDIC         R HIP REPLACED    HX ORTHOPAEDIC         BOTH SHOULDER SURGERY     Prior Level of Function/Home Situation: Independent  Personal factors and/or comorbidities impacting plan of care:      Home Situation  Home Environment: Private residence  # Steps to Enter: 2  Rails to Enter: No  One/Two Story Residence: One story  Living Alone: Yes  Support Systems: Friends \ neighbors (inconsistent, can ask too come around more)  Patient Expects to be Discharged to[de-identified] Private residence  Current DME Used/Available at Home: Albin Frankie, straight, Walker, rolling  Tub or Shower Type: Tub/Shower combination     EXAMINATION/PRESENTATION/DECISION MAKING:   Critical Behavior:  Neurologic State: Alert           Hearing: Auditory  Auditory Impairment: None  Skin:    Edema:   Range Of Motion:  AROM: Generally decreased, functional           PROM: Generally decreased, functional           Strength:    Strength: Generally decreased, functional                    Tone & Sensation:                                  Coordination:  Coordination: Within functional limits  Vision:      Functional Mobility:  Bed Mobility:  Rolling: Contact guard assistance  Supine to Sit: Contact guard assistance  Sit to Supine: Contact guard assistance  Scooting: Contact guard assistance  Transfers:  Sit to Stand: Contact guard assistance  Stand to Sit: Contact guard assistance                       Balance:   Sitting: Intact  Standing: Impaired  Standing - Static: Fair  Standing - Dynamic : Good  Ambulation/Gait Training:  Distance (ft): 9 Feet (ft)  Assistive Device: Brace/Splint;Gait belt  Ambulation - Level of Assistance: Contact guard assistance        Gait Abnormalities: Decreased step clearance;Trunk sway increased        Base of Support: Widened     Speed/Vane: Pace decreased (<100 feet/min)                                              Stairs:                           Therapeutic Exercises: Functional Measure:  Tinetti test:      Sitting Balance: 1  Arises: 1  Attempts to Rise: 2  Immediate Standing Balance: 1  Standing Balance: 1  Nudged: 2  Eyes Closed: 1  Turn 360 Degrees - Continuous/Discontinuous: 1  Turn 360 Degrees - Steady/Unsteady: 1  Sitting Down: 1  Balance Score: 12  Indication of Gait: 1  R Step Length/Height: 1  L Step Length/Height: 1  R Foot Clearance: 1  L Foot Clearance: 1  Step Symmetry: 1  Step Continuity: 1  Path: 1  Trunk: 0  Walking Time: 0  Gait Score: 8  Total Score: 20         Tinetti Test and G-code impairment scale:  Percentage of Impairment CH     0%    CI     1-19% CJ     20-39% CK     40-59% CL     60-79% CM     80-99% CN      100%   Tinetti  Score 0-28 28 23-27 17-22 12-16 6-11 1-5 0          Tinetti Tool Score Risk of Falls  <19 = High Fall Risk  19-24 = Moderate Fall Risk  25-28 = Low Fall Risk  Tinetti ME. Performance-Oriented Assessment of Mobility Problems in Elderly Patients. Cooley 66; [id number]. (Scoring Description: PT Bulletin Feb. 10, 1993)     Older adults: Won Portillo et al, 2009; n = 1000 [facility] elderly evaluated with ABC, BETZY, ADL, and IADL)  · Mean BETZY score for males aged 69-68 years = 26.21(3.40)  · Mean BETZY score for females age 69-68 years = 25.16(4.30)  · Mean BETZY score for males over 80 years = 23.29(6.02)  · Mean BETZY score for females over 80 years = 17.20(8.32)            G codes: In compliance with CMSs Claims Based Outcome Reporting, the following G-code set was chosen for this patient based on their primary functional limitation being treated: The outcome measure chosen to determine the severity of the functional limitation was the Tinetti with a score of 20/28 which was correlated with the impairment scale.       · Mobility - Walking and Moving Around:               - CURRENT STATUS:    CJ - 20%-39% impaired, limited or restricted               - GOAL STATUS:           CI - 1%-19% impaired, limited or restricted  - D/C STATUS:                       ---------------To be determined---------------      Physical Therapy Evaluation Charge Determination   History Examination Presentation Decision-Making   MEDIUM  Complexity : 1-2 comorbidities / personal factors will impact the outcome/ POC  LOW Complexity : 1-2 Standardized tests and measures addressing body structure, function, activity limitation and / or participation in recreation  LOW Complexity : Stable, uncomplicated  Other outcome measures Tinetti  MEDIUM      Based on the above components, the patient evaluation is determined to be of the following complexity level: LOW      Pain:  Pain Scale 1: Numeric (0 - 10)  Pain Intensity 1: 3  Pain Location 1: Back  Pain Orientation 1: Lower  Pain Description 1: Aching  Pain Intervention(s) 1: Medication (see MAR)  Activity Tolerance:   Good, patient fully participated, VSS throughout   Please refer to the flowsheet for vital signs taken during this treatment. After treatment:   [ ]         Patient left in no apparent distress sitting up in chair  [X]         Patient left in no apparent distress in bed  [X]         Call bell left within reach  [X]         Nursing notified  [ ]         Caregiver present  [ ]         Bed alarm activated      COMMUNICATION/EDUCATION:   The patients plan of care was discussed with: Registered Nurse.  [X]         Fall prevention education was provided and the patient/caregiver indicated understanding. [X]         Patient/family have participated as able in goal setting and plan of care. [X]         Patient/family agree to work toward stated goals and plan of care. [ ]         Patient understands intent and goals of therapy, but is neutral about his/her participation. [ ]         Patient is unable to participate in goal setting and plan of care.      Thank you for this referral.  Jennifer Wong  PT, DPT   Time Calculation: 20 mins

## 2017-09-21 NOTE — PROGRESS NOTES
ORTHOPAEDIC LUMBAR FUSION PROGRESS NOTE    NAME: Chito Mosqueda   :  1960   MRN:  [id number]   DATE:  2017    POD: 1 Day Post-Op  S/P: Procedure(s):  LUMBAR LAMINECTOMY L2-S1, POSTERIOR SPINAL FUSION L4-S1    SUBJECTIVE:    Patient is lying in the bed on his left side with the head of the bed elevated. Pain seems to be managed. No leg pain or numbness. He complains of heartburn. Afebrile. Bradycardic. Previous EKG was of poor quality and reordered. Patient refused second EKG due to cost. He does not take medications for HTN. No nausea/vomiting. Marcano and drain in place. TLSO at the bedside    Recent Labs      17   0420   HGB  13.9   HCT  41.2   NA  135*   K  4.2   CL  99   CO2  31   BUN  14   CREA  1.13   GLU  132*     Patient Vitals for the past 12 hrs:   BP Temp Pulse Resp SpO2   17 0406 (!) 151/93 97.5 °F (36.4 °C) 66 16 96 %   17 0045 (!) 139/97 97.6 °F (36.4 °C) 66 16 100 %   17 2345 134/88 99.1 °F (37.3 °C) 71 16 100 %   17 2244 121/90 97.3 °F (36.3 °C) 66 16 99 %   17 2220 120/70 - (!) 55 14 98 %   17 2215 102/59 - (!) 50 14 97 %   17 2200 109/59 - (!) 53 14 96 %   17 2145 116/53 98.4 °F (36.9 °C) (!) 54 13 96 %   17 2130 114/55 - 62 16 96 %   17 2115 143/74 98.5 °F (36.9 °C) 69 16 98 %   17 2113 167/74 98.5 °F (36.9 °C) 74 14 99 %   17 2110 131/70 - 65 18 96 %   17 2105 147/83 - 74 18 97 %     EXAM:  Positive strength/ROM bilat lower ext. Neuro intact  Dressings clean, dry and intact     PLAN:  D/C PCA, change to PO pain medications  PT/OT, OOB  Bowel regimen.  Slowly advance to a regular diet as tolerated  Ice packs to back for PRN pain  VTE prophylaxis: SCDs and TEDs  Encourage use of ICS   Discharge planning per care management    Lopez Aquino PA-C

## 2017-09-21 NOTE — PERIOP NOTES
Patient: Issa Schwab MRN: [id number]  SSN: [id number]   YOB: 1960  Age: 60 y.o. Sex: male     Patient is status post Procedure(s):  LUMBAR LAMINECTOMY L2-S1, POSTERIOR SPINAL FUSION L4-S1. Surgeon(s) and Role:     * Lavinia Gallegos MD - Primary    Local/Dose/Irrigation:            Infusion Catheter 02/21/13 (Active)            Peripheral IV 02/04/14 Left Wrist (Active)       Peripheral IV 09/20/17 Left; Lower Forearm (Active)            Airway - Endotracheal Tube 09/20/17 Oral (Active)                   Dressing/Packing:  Wound Back Lower;Medial-DRESSING TYPE: Topical skin adhesive/glue;4 x 4;Special tape (comment) (09/20/17 2047)  Splint/Cast:  ]    Other:  Marcano catheter, 10fr hemovac

## 2017-09-21 NOTE — ROUTINE PROCESS
Primary Nurse Steffany Villasenor and Kirill Calles, RN performed a dual skin assessment on this patient No impairment noted  Sean score is 20

## 2017-09-21 NOTE — PROGRESS NOTES
Problem: Mobility Impaired (Adult and Pediatric)  Goal: *Acute Goals and Plan of Care (Insert Text)  Physical Therapy Goals    Initiated 9/21/2017  1. Patient will move from supine to sit and sit to supine , scoot up and down and roll side to side in bed with modified independence within 4 days. 2. Patient will perform sit to stand with modified independence within 4 days. 3. Patient will ambulate with modified independence for 150 feet with the least restrictive device within 4 days. 4. Patient will ascend/descend 2 stairs with no handrail(s) with modified independence within 4 days. 5. Patient will verbalize and demonstrate understanding of spinal precautions (No bending, lifting greater than 5 lbs, or twisting; log-roll technique; frequent repositioning as instructed) within 4 days. PHYSICAL THERAPY TREATMENT  Patient: Ledy Gage (1 y.o. male)  Date: 9/21/2017  Precautions: Back, Fall      ASSESSMENT:  Chart reviewed and RN cleared patient for mobility. Patient was agreeable to participate and was received in bed. TLSO was secured with Max A x 1 and education was provided in order for patient to be able to secure independently tomorrow. Patient required CGA for all bed mobility and transfers and demonstrated fair standing dynamic balance with donning second robe in standing. Patient ambulated 80 feet with supervision with very little abnormalities observed. Patient ended session in chair with OT present to provide further treatment. All needs were placed within reach and RN notified of patient's status. Progression toward goals:  [X]      Improving appropriately and progressing toward goals  [ ]      Improving slowly and progressing toward goals  [ ]      Not making progress toward goals and plan of care will be adjusted       PLAN:  Patient continues to benefit from skilled intervention to address the above impairments. Continue treatment per established plan of care.   Discharge Recommendations: Home Health  Further Equipment Recommendations for Discharge:  tbd       SUBJECTIVE:   Patient stated It feels so good to sit down instead of getting in that bed.    The patient stated 3/3 back precautions. Reviewed all 3 with patient. OBJECTIVE DATA SUMMARY:   Functional Mobility Training:  Bed Mobility:  Log Rolling: Contact guard assistance  Supine to Sit: Contact guard assistance  Sit to Supine: Contact guard assistance  Scooting: Contact guard assistance        Brace donned with  maximal assistance   Transfers:  Sit to Stand: Contact guard assistance  Stand to Sit: Contact guard assistance                             Ambulation/Gait Training:  Distance (ft): 80 Feet (ft)  Assistive Device: Brace/Splint;Gait belt  Ambulation - Level of Assistance: Supervision        Gait Abnormalities: Decreased step clearance        Base of Support: Widened     Speed/Vane: Pace decreased (<100 feet/min)                                  Stairs: Therapeutic Exercises:      Pain:  Pain Scale 1: Numeric (0 - 10)  Pain Intensity 1: 3  Pain Location 1: Back  Pain Orientation 1: Lower  Pain Description 1: Aching  Pain Intervention(s) 1: Medication (see MAR)  Activity Tolerance:   Good, patient is progressing well with therapy and functional mobility  Please refer to the flowsheet for vital signs taken during this treatment.   After treatment:   [X]  Patient left in no apparent distress sitting up in chair  [ ]  Patient left in no apparent distress in bed  [X]  Call bell left within reach  [X]  Nursing notified  [ ]  Caregiver present  [ ]  Bed alarm activated      COMMUNICATION/COLLABORATION:   The patients plan of care was discussed with: Occupational Therapist and Registered Nurse     Leona Thomas PT, DPT   Time Calculation: 25 mins

## 2017-09-21 NOTE — DISCHARGE INSTRUCTIONS
After Hospital Care Plan:  Discharge Instructions Lumbar Fusion Surgery   Dr. Davis Jeronimo    Patient Name Christiano Lama  Date of procedure: 9/20/17  Procedure (Procedure(s):  LUMBAR LAMINECTOMY L2-S1, POSTERIOR SPINAL FUSION L4-S1)  Surgeon (Surgeon(s) and Role:     * Montez Buck MD - Primary)   PCP:    Follow up appointments  -follow up with Dr. Davis Jeronimo in 2 weeks. Call [phone], ext [phone] to make an appointment as soon as you get home from the hospital.    [facility]   phone: _______________________  The agency will contact you to arrange dates/times for visits. Please call them if you do not hear from them within 24 hours after you are discharged      When to call your Orthopaedic Surgeon:  -Signs of infection-if your incision is red; continues to have drainage; drainage has a foul odor or if you have a persistent fever over 101 degrees for 24 hours  -nausea or vomiting, severe headache  -loss of bowel or bladder function, inability to urinate  -changes in sensation in your arms or legs (numbness, tingling, loss of color)  -increased weakness-greater than before your surgery  -severe pain or pain not relieved by medications  -Signs of a blood clot in your leg-calf pain, tenderness, redness, swelling of lower leg  When to call your Primary Care Physician:  -Concerns about medical conditions such as diabetes, high blood pressure, asthma, congestive heart failure  -Call if blood sugars are elevated, persistent headache or dizziness, coughing or congestion, constipation or diarrhea, burning with urination, abnormal heart rate  When to call 911and go to the nearest emergency room  -acute onset of chest pain, shortness of breath, difficulty breathing    Activity  - Your only exercise should be walking. Start with short frequent walks and increase your walking distance each day.  -Limit the amount of time you sit to 20-30 minute intervals.   Sitting for prolonged periods of time will be uncomfortable for you following surgery.  -Do NOT lift anything over 5 pounds  -Do NOT do any straining, twisting or bending  -When you are in bed, you may lay on your back or on either side. Do NOT lie on your stomach    Brace - if ordered by surgeon   -If you have a back brace, you should wear your brace at all times when you are out of bed. Do not wear the brace while in bed or showering.  -Remember to always wear a cotton t-shirt underneath your brace.  -Do not bend or twist when your brace is off      Driving  -You may not drive or return to work until instructed by your physician. However, you may ride in the car for short periods of time. Incision Care  Your incision has been closed with absorbable sutures and the Dermabond Prineo skin closure system. This is a combination of a mesh and a liquid adhesive that will assist with healing. The mesh is to remain on your incision for 2 weeks. A dry dressing (ABD and tape) will be placed over it and should be changed daily. Please make sure the person performing your dressing changes washes their hands before touching the dressing. You may take brief showers but do not run the water directly onto the wound. After your shower, remove your dressing and blot your incision dry with a clean, soft towel and replace the dry dressing. Do not allow the tape to come in contact with the mesh. Do not rub or apply any lotions or ointments to your incision site. Do not soak or scrub your wound. The mesh dressing will be removed during your two week follow-up appointment. If you experience drainage leaking from underneath the mesh or if it peels off before 2 weeks, please contact your orthopedic surgeons office. Showering  -You may shower in approximately 4 days after your surgery.      -Leave the dressing on during your shower without allowing the water to run over it.    -Reminder- your brace can be removed while showering.  Remember to not bend or twist while your brace is off.      -Do not take a tub bath. Preventing blood clots  -You have been given T.E.D. stockings to wear. Continue to wear these for 7 days after your discharge. Put them on in the morning and take them off at night.      -They are used to increase your circulation and prevent blood clots from forming in your legs    Pain management  -take pain medication as prescribed; decrease the amount you use as your pain lessens  -avoid alcoholic beverages while taking pain medication  -avoid NSAIDS (Aleve, Ibuprofen, Aspirin) until your surgeon approves it  -Please be aware that many medications contain Tylenol. We do not want you to over medicate so please read the information below as a guide. Do not take more than 4 Grams of Tylenol in a 24 hour period. (There are 1000 milligrams in one Gram)  Percocet contains 325 mg of Tylenol per tablet (do not take more than 12 tablets in 24 hours)  Lortab contains 500 mg of Tylenol per tablet (do not take more than 8 tablets in 24 hours)  Norco contains 325 mg of Tylenol per tablet (do not take more than 12 tablets in 24 hours). Diet  -resume usual diet; drink plenty of fluids; eat foods high in fiber  -It is important to have regular bowel movements. Pain medications may cause constipation. You may want to take a stool softener (such as Senokot-S or Colace) to prevent constipation. If constipation occurs, take a laxative (such as Dulcolax tablets, Milk of Magnesia, or a suppository).   Laxatives should only be used if the above preventable measures have failed and you still have not had a bowel movement after three days

## 2017-09-21 NOTE — PROGRESS NOTES
Problem: Falls - Risk of  Goal: *Absence of Falls  Document Cady Fall Risk and appropriate interventions in the flowsheet.    Outcome: Progressing Towards Goal  Fall Risk Interventions:  Mobility Interventions: Bed/chair exit alarm, Communicate number of staff needed for ambulation/transfer, Patient to call before getting OOB, PT Consult for mobility concerns, Utilize gait belt for transfers/ambulation           Medication Interventions: Bed/chair exit alarm, Patient to call before getting OOB, Teach patient to arise slowly

## 2017-09-21 NOTE — ANESTHESIA POSTPROCEDURE EVALUATION
Post-Anesthesia Evaluation and Assessment    Patient: Jenny Leigh MRN: [id number]  SSN: [id number]    YOB: 1960  Age: 60 y.o. Sex: male       Cardiovascular Function/Vital Signs  Visit Vitals    BP (!) 151/93 (BP 1 Location: Right arm)    Pulse 66    Temp 36.4 °C (97.5 °F)    Resp 16    Ht 5' 11\" (1.803 m)    Wt 77.1 kg (169 lb 15.6 oz)    SpO2 96%    BMI 23.71 kg/m2       Patient is status post general anesthesia for Procedure(s):  LUMBAR LAMINECTOMY L2-S1, POSTERIOR SPINAL FUSION L4-S1. Nausea/Vomiting: None    Postoperative hydration reviewed and adequate. Pain:  Pain Scale 1: Numeric (0 - 10) (09/20/17 2244)  Pain Intensity 1: 8 (09/20/17 2244)   Managed    Neurological Status:   Neuro (WDL): Within Defined Limits (09/20/17 2145)  Neuro  Neurologic State: Alert (09/20/17 2245)   At baseline    Mental Status and Level of Consciousness: Arousable    Pulmonary Status:   O2 Device: Room air (09/21/17 0406)   Adequate oxygenation and airway patent    Complications related to anesthesia: None    Post-anesthesia assessment completed.  No concerns    Signed By: Alpesh Smith MD     September 21, 2017

## 2017-09-22 LAB — HGB BLD-MCNC: 12.4 G/DL (ref 12.1–17)

## 2017-09-22 PROCEDURE — 97535 SELF CARE MNGMENT TRAINING: CPT | Performed by: OCCUPATIONAL THERAPIST

## 2017-09-22 PROCEDURE — 85018 HEMOGLOBIN: CPT | Performed by: PHYSICIAN ASSISTANT

## 2017-09-22 PROCEDURE — 74011250637 HC RX REV CODE- 250/637: Performed by: PHYSICIAN ASSISTANT

## 2017-09-22 PROCEDURE — 65660000000 HC RM CCU STEPDOWN

## 2017-09-22 PROCEDURE — 97116 GAIT TRAINING THERAPY: CPT

## 2017-09-22 PROCEDURE — 36415 COLL VENOUS BLD VENIPUNCTURE: CPT | Performed by: PHYSICIAN ASSISTANT

## 2017-09-22 RX ADMIN — Medication 10 ML: at 06:00

## 2017-09-22 RX ADMIN — OXYCODONE HYDROCHLORIDE 10 MG: 5 TABLET ORAL at 18:37

## 2017-09-22 RX ADMIN — DIAZEPAM 5 MG: 5 TABLET ORAL at 03:40

## 2017-09-22 RX ADMIN — ACETAMINOPHEN 650 MG: 325 TABLET, FILM COATED ORAL at 12:36

## 2017-09-22 RX ADMIN — OXYCODONE HYDROCHLORIDE 10 MG: 5 TABLET ORAL at 09:52

## 2017-09-22 RX ADMIN — Medication 10 ML: at 21:38

## 2017-09-22 RX ADMIN — OXYCODONE HYDROCHLORIDE 10 MG: 5 TABLET ORAL at 12:36

## 2017-09-22 RX ADMIN — OXYCODONE HYDROCHLORIDE 10 MG: 5 TABLET ORAL at 03:25

## 2017-09-22 RX ADMIN — OXYCODONE HYDROCHLORIDE 10 MG: 5 TABLET ORAL at 21:37

## 2017-09-22 RX ADMIN — DOCUSATE SODIUM AND SENNOSIDES 1 TABLET: 8.6; 5 TABLET, FILM COATED ORAL at 09:52

## 2017-09-22 RX ADMIN — ACETAMINOPHEN 650 MG: 325 TABLET, FILM COATED ORAL at 18:37

## 2017-09-22 RX ADMIN — Medication 20 MG: at 06:30

## 2017-09-22 RX ADMIN — OXYCODONE HYDROCHLORIDE 10 MG: 5 TABLET ORAL at 15:35

## 2017-09-22 RX ADMIN — OXYCODONE HYDROCHLORIDE 10 MG: 5 TABLET ORAL at 06:26

## 2017-09-22 RX ADMIN — Medication 10 ML: at 14:00

## 2017-09-22 RX ADMIN — ACETAMINOPHEN 650 MG: 325 TABLET, FILM COATED ORAL at 06:26

## 2017-09-22 NOTE — PROGRESS NOTES
Attempted to put remote telemetry monitor on patient. He is sitting up in the chair and does not want to be connected to the monitor at this time. Asked me to come back \"when this brace is off me\". Notified primary nurse.

## 2017-09-22 NOTE — PROGRESS NOTES
Problem: Mobility Impaired (Adult and Pediatric)  Goal: *Acute Goals and Plan of Care (Insert Text)  Physical Therapy Goals    Initiated 9/21/2017  1. Patient will move from supine to sit and sit to supine , scoot up and down and roll side to side in bed with modified independence within 4 days. 2. Patient will perform sit to stand with modified independence within 4 days. 3. Patient will ambulate with modified independence for 150 feet with the least restrictive device within 4 days. 4. Patient will ascend/descend 2 stairs with no handrail(s) with modified independence within 4 days. 5. Patient will verbalize and demonstrate understanding of spinal precautions (No bending, lifting greater than 5 lbs, or twisting; log-roll technique; frequent repositioning as instructed) within 4 days. PHYSICAL THERAPY TREATMENT  Patient: Dayana Shah (15 y.o. male)  Date: 9/22/2017  Diagnosis: SPINAL STENOSIS L2-S1  Spinal stenosis <principal problem not specified>  Procedure(s) (LRB):  LUMBAR LAMINECTOMY L2-S1, POSTERIOR SPINAL FUSION L4-S1 (N/A) 2 Days Post-Op  Precautions: Back, Fall      ASSESSMENT:  Chart reviewed and RN cleared patient for mobility. Patient was agreeable to participate and was received in bed sidelying. Patient is irritable and had a few complaints regarding his care. Throughout session patient followed all commands and required CGA for all mobility and ambulation and secured braced with Min A x 1. During ambulation patient reported feeling nauseous and requested to go back to room around 40-50 feet. Patient ended session in chair with pillows placed behind back and under hips and all needs were placed within reach and RN notified of patient's status. Patient still needs to be cleared on stairs prior to discharge home. Patient continues to report elevated pain levels with all mobility and has questions regarding pain control upon discharge.      Progression toward goals:  [X]      Improving appropriately and progressing toward goals  [ ]      Improving slowly and progressing toward goals  [ ]      Not making progress toward goals and plan of care will be adjusted       PLAN:  Patient continues to benefit from skilled intervention to address the above impairments. Continue treatment per established plan of care. Discharge Recommendations:  Home Health  Further Equipment Recommendations for Discharge:  TBD       SUBJECTIVE:   Patient stated I dont know how I will be able to go home in this much pain. Valerie Baldemar   The patient stated 3/3 back precautions. Reviewed all 3 with patient. OBJECTIVE DATA SUMMARY:   Critical Behavior:  Neurologic State: Alert  Orientation Level: Oriented X4  Cognition: Appropriate decision making  Safety/Judgement: Awareness of environment, Good awareness of safety precautions  Functional Mobility Training:  Bed Mobility:  Log                   Brace donned with  minimal assistance/contact guard assist   Transfers:  Sit to Stand: Contact guard assistance  Stand to Sit: Contact guard assistance                             Balance:  Sitting: Intact  Standing: Impaired  Standing - Static: Good  Standing - Dynamic : Fair  Ambulation/Gait Training:  Distance (ft): 90 Feet (ft)  Assistive Device: Brace/Splint;Gait belt  Ambulation - Level of Assistance: Supervision        Gait Abnormalities: Antalgic;Decreased step clearance        Base of Support: Widened                                        Stairs: Therapeutic Exercises:      Pain:  Pain Scale 1: Numeric (0 - 10)  Pain Intensity 1: 7  Pain Location 1: Back  Pain Orientation 1: Lower  Pain Description 1: Aching  Pain Intervention(s) 1: Medication (see MAR)  Activity Tolerance:   Fair, patient ambulated greater distance however requested to return to room due to nausea/pain  Please refer to the flowsheet for vital signs taken during this treatment.   After treatment:   [X]  Patient left in no apparent distress sitting up in chair  [ ]  Patient left in no apparent distress in bed  [X]  Call bell left within reach  [X]  Nursing notified  [ ]  Caregiver present  [ ]  Bed alarm activated      COMMUNICATION/COLLABORATION:   The patients plan of care was discussed with: Registered Nurse     Dominique Medina PT, DPT   Time Calculation: 17 mins

## 2017-09-22 NOTE — PROGRESS NOTES
Problem: Self Care Deficits Care Plan (Adult)  Goal: *Acute Goals and Plan of Care (Insert Text)  Occupational Therapy Goals  Initiated 9/21/2017  1. Patient will perform lower body dressing with modified independence using AE PRN within 7 days. 2. Patient will perform toileting with modified independence using most appropriate DME within 7 days. 3. Patient will perform gathering ADLs 4/4 high and low without cues precautions at modified independence within 7 days. 4. Patient will don/doff back brace at modified independence within 7 days. 5. Patient will verbalize/demonstrate 3/3 back precautions during ADL tasks without cues within 7 days. OCCUPATIONAL THERAPY TREATMENT  Patient: hTeo Arshad (85 y.o. male)  Date: 9/22/2017  Diagnosis: SPINAL STENOSIS L2-S1  Spinal stenosis <principal problem not specified>  Procedure(s) (LRB):  LUMBAR LAMINECTOMY L2-S1, POSTERIOR SPINAL FUSION L4-S1 (N/A) 2 Days Post-Op  Precautions: Back, Fall      ASSESSMENT:  Patient making progress, continues to have increased pain however reporting it is better this date. Patient educated on contents of hip kit and trialed long shoe horn and sock-aid, reports having a reacher and plans to make a sock-aid out of a 2 liter bottle and rope. Patient concerned re: discharge tomorrow if pain still high as he is home alone. Will follow up tomorrow and recommend ADL's standing at sink and toileting in bathroom. Progression toward goals:  [ ]       Improving appropriately and progressing toward goals  [X]       Improving slowly and progressing toward goals  [ ]       Not making progress toward goals and plan of care will be adjusted       PLAN:  Patient continues to benefit from skilled intervention to address the above impairments. Continue treatment per established plan of care.   Discharge Recommendations:  Home Health  Further Equipment Recommendations for Discharge:  none       SUBJECTIVE:   Patient stated I don't know if I can go tomorrow, my pain is better but it's still high.       OBJECTIVE DATA SUMMARY:   Cognitive/Behavioral Status:  Neurologic State: Alert  Orientation Level: Oriented X4  Cognition: Follows commands; Appropriate safety awareness; Appropriate for age attention/concentration; Appropriate decision making  Perception: Appears intact  Perseveration: No perseveration noted  Safety/Judgement: Awareness of environment; Fall prevention;Home safety; Insight into deficits     Functional Mobility and Transfers for ADLs:  Bed Mobility:  Supine to Sit: Modified independent  Sit to Supine: Modified independent     Transfers:  Sit to Stand: Modified independent        Balance:  Sitting: Intact  Standing: Intact  Standing - Static: Good  Standing - Dynamic : Good     ADL Intervention:     Re-ed back precautions, techniques to adhere to precautions during ADL's and ADL mobility/transfers, home modifications, optimal sitting,  use of hip kit for LE dressing, safe footwear for fall prevention and improved support,   educated on positioning in supine and sidelying to facilitate neutral alignment and increased comfort, provided visual demonstration to increase carryover      Educated on compensatory techniques to perform nory-care and adaptive equipment     Patient trialed long shoe horn to doff sock and sock-aid to don, declined need for at home as he will make a \"sock-aid\" at home with a 2 liter bottle and string. Cognitive Retraining  Safety/Judgement: Awareness of environment; Fall prevention;Home safety; Insight into deficits              Pain:  Increased pain seated, decreased repositioned on side        Activity Tolerance:   Fair   Please refer to the flowsheet for vital signs taken during this treatment.   After treatment:   [ ] Patient left in no apparent distress sitting up in chair  [X] Patient left in no apparent distress in bed  [X] Call bell left within reach  [X] Nursing notified  [ ] Caregiver present  [ ] Bed alarm activated COMMUNICATION/COLLABORATION:   The patients plan of care was discussed with: Registered Nurse     Carrol Ramires OTR/L  Time Calculation: 24 mins

## 2017-09-22 NOTE — PROGRESS NOTES
Care Management Interventions  PCP Verified by CM: Yes  Mode of Transport at Discharge:  (friend to provide transport home)  Transition of Care Consult (CM Consult): [address]: No  Reason Outside [place]: Physician referred to specific agency (Patient recieved call from Dale Reddy )  Bhavya Signup: No  Discharge Durable Medical Equipment: No  Physical Therapy Consult: Yes  Occupational Therapy Consult: Yes  Speech Therapy Consult: No  Current Support Network: Lives Alone  Confirm Follow Up Transport: Friends  Plan discussed with Pt/Family/Caregiver: Yes  Freedom of Choice Offered: Yes  Discharge Location  Discharge Placement: Home with home health    Chart reviewed and discussed in rounds. Consult received. Cm met with patient to explain role and discuss transitions of care. Patient was admitted on 9/20/2017 for LUMBAR LAMINECTOMY L2-S1, POSTERIOR SPINAL FUSION L4-S1. Patient lives home alone but has made arrangements for someone to assist him at home. Patient uses [facility] on [id number] but would like to use bedside RX upon discharge. CM provided choice for [facility] for PT/OT consult. Patient said he received a call from a ria named Dale Reddy with [facility], and would like to use them. Cm sent referral via [employer]. Cm to follow to assist with transitions of care.  [address]

## 2017-09-22 NOTE — PROGRESS NOTES
Problem: Discharge Planning  Goal: *Discharge to safe environment  Outcome: Progressing Towards Goal  See CM note for transitions of care.  [address] [facility] [address] [facility]

## 2017-09-22 NOTE — PROGRESS NOTES
ORTHOPAEDIC LUMBAR FUSION PROGRESS NOTE    NAME: Judge Pineda   :  1960   MRN:  [id number]   DATE:  2017    POD: 2 Days Post-Op  S/P: Procedure(s):  LUMBAR LAMINECTOMY L2-S1, POSTERIOR SPINAL FUSION L4-S1    SUBJECTIVE:    Patient is lying in the bed on his left side with the head of the bed elevated. Pain seems to be managed. He complains of right buttock pan. No leg pain or numbness. Afebrile/VSS. No nausea/vomiting. He ambulated a good distance with physical therapy yesterday. Voiding. Drain still in place. Recent Labs      17   0331  17   0420   HGB  12.4  13.9   HCT   --   41.2   NA   --   135*   K   --   4.2   CL   --   99   CO2   --   31   BUN   --   14   CREA   --   1.13   GLU   --   132*     Patient Vitals for the past 12 hrs:   BP Temp Pulse Resp SpO2   17 0321 103/62 97.8 °F (36.6 °C) 71 18 97 %   17 2051 115/73 98 °F (36.7 °C) 68 16 96 %     EXAM:  Actively moving bilateral lower extremities    PLAN:  PO pain medications as needed  PT/OT, OOB  Bowel regimen.  Slowly advance to a regular diet as tolerated  Ice packs to back for PRN pain  VTE prophylaxis: SCDs and TEDs  Encourage use of ICS   Remove drain once output decreases  Discharge to home likely tomorrow    Evelia Anaya PA-C

## 2017-09-22 NOTE — PROGRESS NOTES
Problem: Mobility Impaired (Adult and Pediatric)  Goal: *Acute Goals and Plan of Care (Insert Text)  Physical Therapy Goals    Initiated 9/21/2017  1. Patient will move from supine to sit and sit to supine , scoot up and down and roll side to side in bed with modified independence within 4 days. 2. Patient will perform sit to stand with modified independence within 4 days. 3. Patient will ambulate with modified independence for 150 feet with the least restrictive device within 4 days. 4. Patient will ascend/descend 2 stairs with no handrail(s) with modified independence within 4 days. 5. Patient will verbalize and demonstrate understanding of spinal precautions (No bending, lifting greater than 5 lbs, or twisting; log-roll technique; frequent repositioning as instructed) within 4 days. PHYSICAL THERAPY TREATMENT  Patient: Jono Maravilla (15 y.o. male)  Date: 9/22/2017  Precautions: Back, Fall      ASSESSMENT:  Chart reviewed and RN cleared patient for mobility. Patient was agreeable to participate and was received in bed sidelying. This session patient is in better spirits with pain managed better compared to previous sessions. Patient ambulated 140 feet with modified independence and managed 3 stairs, however patient reported not feeling \"100% confident\" yet. Patient ended session in chair with all needs placed within reach and RN and CM notified of patient's progress. PT educated patient on positioning schedule and to use call bell for any mobility other than standing to stretch every 30-45 mins, patient acknowledged understanding. PT anticipating patient will be cleared for discharge over the weekend as patient is progressing well and pain management is improving. Over the weekend patient will need to practice stairs and ambulate farther to be cleared.       Progression toward goals:  [X]      Improving appropriately and progressing toward goals  [ ]      Improving slowly and progressing toward goals  [ ] Not making progress toward goals and plan of care will be adjusted       PLAN:  Patient continues to benefit from skilled intervention to address the above impairments. Continue treatment per established plan of care. Discharge Recommendations:  Home Health  Further Equipment Recommendations for Discharge:  TBD       SUBJECTIVE:   Patient stated This is good man, I can sit here for a bit.    The patient stated 3/3 back precautions. Reviewed all 3 with patient. OBJECTIVE DATA SUMMARY:   Functional Mobility Training:  Bed Mobility:  Log                   Brace donned with  modified independence   Transfers:  Sit to Stand: Modified independent  Stand to Sit: Modified independent                             Ambulation/Gait Training:  Distance (ft): 140 Feet (ft)  Assistive Device: Brace/Splint;Gait belt  Ambulation - Level of Assistance: Modified independent        Gait Abnormalities: Decreased step clearance        Base of Support: Widened                                        Stairs:  Number of Stairs Trained: 3  Stairs - Level of Assistance: Modified independent  Rail Use: None  Therapeutic Exercises:      Pain:  Pain Scale 1: Numeric (0 - 10)  Pain Intensity 1: 3  Pain Location 1: Back  Pain Orientation 1: Lower  Pain Description 1: Aching  Pain Intervention(s) 1: Medication (see MAR)  Activity Tolerance:   Good, patient ambulated farther with fewer reports of pain/symptoms     Please refer to the flowsheet for vital signs taken during this treatment.   After treatment:   [X]  Patient left in no apparent distress sitting up in chair  [ ]  Patient left in no apparent distress in bed  [X]  Call bell left within reach  [X]  Nursing notified  [ ]  Caregiver present  [ ]  Bed alarm activated      COMMUNICATION/COLLABORATION:   The patients plan of care was discussed with: Registered Nurse     Gabby Soriano PT, DPT   Time Calculation: 20 mins

## 2017-09-23 PROCEDURE — 74011250637 HC RX REV CODE- 250/637: Performed by: PHYSICIAN ASSISTANT

## 2017-09-23 PROCEDURE — 65660000000 HC RM CCU STEPDOWN

## 2017-09-23 PROCEDURE — 51798 US URINE CAPACITY MEASURE: CPT

## 2017-09-23 PROCEDURE — 97116 GAIT TRAINING THERAPY: CPT

## 2017-09-23 PROCEDURE — 74011250637 HC RX REV CODE- 250/637: Performed by: ORTHOPAEDIC SURGERY

## 2017-09-23 RX ORDER — CYCLOBENZAPRINE HCL 10 MG
10 TABLET ORAL 3 TIMES DAILY
Status: DISCONTINUED | OUTPATIENT
Start: 2017-09-23 | End: 2017-09-24 | Stop reason: HOSPADM

## 2017-09-23 RX ADMIN — ACETAMINOPHEN 650 MG: 325 TABLET, FILM COATED ORAL at 11:21

## 2017-09-23 RX ADMIN — CALCIUM CARBONATE (ANTACID) CHEW TAB 500 MG 200 MG: 500 CHEW TAB at 16:48

## 2017-09-23 RX ADMIN — OXYCODONE HYDROCHLORIDE 10 MG: 5 TABLET ORAL at 11:21

## 2017-09-23 RX ADMIN — Medication 10 ML: at 14:00

## 2017-09-23 RX ADMIN — OXYCODONE HYDROCHLORIDE 10 MG: 5 TABLET ORAL at 00:41

## 2017-09-23 RX ADMIN — Medication 10 ML: at 05:00

## 2017-09-23 RX ADMIN — ACETAMINOPHEN 650 MG: 325 TABLET, FILM COATED ORAL at 07:31

## 2017-09-23 RX ADMIN — OXYCODONE HYDROCHLORIDE 5 MG: 5 TABLET ORAL at 22:18

## 2017-09-23 RX ADMIN — DOCUSATE SODIUM AND SENNOSIDES 1 TABLET: 8.6; 5 TABLET, FILM COATED ORAL at 08:18

## 2017-09-23 RX ADMIN — ACETAMINOPHEN 650 MG: 325 TABLET, FILM COATED ORAL at 00:40

## 2017-09-23 RX ADMIN — OXYCODONE HYDROCHLORIDE 10 MG: 5 TABLET ORAL at 07:31

## 2017-09-23 RX ADMIN — Medication 10 ML: at 22:18

## 2017-09-23 RX ADMIN — DOCUSATE SODIUM AND SENNOSIDES 1 TABLET: 8.6; 5 TABLET, FILM COATED ORAL at 18:15

## 2017-09-23 RX ADMIN — OXYCODONE HYDROCHLORIDE 10 MG: 5 TABLET ORAL at 04:22

## 2017-09-23 RX ADMIN — CYCLOBENZAPRINE HYDROCHLORIDE 10 MG: 10 TABLET, FILM COATED ORAL at 08:58

## 2017-09-23 RX ADMIN — CYCLOBENZAPRINE HYDROCHLORIDE 10 MG: 10 TABLET, FILM COATED ORAL at 16:48

## 2017-09-23 RX ADMIN — CYCLOBENZAPRINE HYDROCHLORIDE 10 MG: 10 TABLET, FILM COATED ORAL at 22:17

## 2017-09-23 RX ADMIN — DIAZEPAM 5 MG: 5 TABLET ORAL at 01:35

## 2017-09-23 RX ADMIN — OXYCODONE HYDROCHLORIDE 10 MG: 5 TABLET ORAL at 15:25

## 2017-09-23 RX ADMIN — ACETAMINOPHEN 650 MG: 325 TABLET, FILM COATED ORAL at 18:14

## 2017-09-23 RX ADMIN — OXYCODONE HYDROCHLORIDE 10 MG: 5 TABLET ORAL at 18:15

## 2017-09-23 NOTE — PROGRESS NOTES
Bedside and Verbal shift change report given to [address] Scott (oncoming nurse) by Madina Woodward RN (offgoing nurse). Report included the following information SBAR, Kardex, Intake/Output, MAR and Recent Results.

## 2017-09-23 NOTE — PROGRESS NOTES
ORTHOPAEDIC LUMBAR FUSION PROGRESS NOTE    NAME: Eve Morejon   :  1960   MRN:  [id number]   DATE:  2017    POD: 2 Days Post-Op  S/P: Procedure(s):  LUMBAR LAMINECTOMY L2-S1, POSTERIOR SPINAL FUSION L4-S1    SUBJECTIVE:    Pt sitting up in chair. States he is having bilateral hip pain. Points to piriformis insertion. States that his back feels fine, but has pain in his hips. Recent Labs      17   0331  17   0420   HGB  12.4  13.9   HCT   --   41.2   NA   --   135*   K   --   4.2   CL   --   99   CO2   --   31   BUN   --   14   CREA   --   1.13   GLU   --   132*     Patient Vitals for the past 12 hrs:   BP Temp Pulse Resp SpO2   17 0808 124/78 97.7 °F (36.5 °C) 70 18 97 %   17 0421 159/82 98.2 °F (36.8 °C) 77 20 96 %     EXAM:  Actively moving bilateral lower extremities    PLAN:  PO pain medications as needed  Will order flexeril to help with hip muscle tightness  PT/OT, OOB  Bowel regimen.  Slowly advance to a regular diet as tolerated  Ice packs to back for PRN pain  VTE prophylaxis: SCDs and TEDs  Encourage use of ICS   Remove drain once output decreases  Discharge to home likely tomorrow    Susannah He DO

## 2017-09-23 NOTE — PROGRESS NOTES
Problem: Mobility Impaired (Adult and Pediatric)  Goal: *Acute Goals and Plan of Care (Insert Text)  Physical Therapy Goals    Initiated 9/21/2017  1. Patient will move from supine to sit and sit to supine , scoot up and down and roll side to side in bed with modified independence within 4 days. 2. Patient will perform sit to stand with modified independence within 4 days. 3. Patient will ambulate with modified independence for 150 feet with the least restrictive device within 4 days. 4. Patient will ascend/descend 2 stairs with no handrail(s) with modified independence within 4 days. 5. Patient will verbalize and demonstrate understanding of spinal precautions (No bending, lifting greater than 5 lbs, or twisting; log-roll technique; frequent repositioning as instructed) within 4 days. PHYSICAL THERAPY TREATMENT     Patient: Dayana Shah (1 y.o. male)  Date: 9/23/2017  Diagnosis: SPINAL STENOSIS L2-S1  Spinal stenosis <principal problem not specified>  Procedure(s) (LRB):  LUMBAR LAMINECTOMY L2-S1, POSTERIOR SPINAL FUSION L4-S1 (N/A) 3 Days Post-Op  Precautions: Back, Fall  Chart, physical therapy assessment, plan of care and goals were reviewed. ASSESSMENT:  Pt was able to increase gait tolerance. Pt reports abdominal discomfort. Nurse was present. Pt is mobilizing at a supervision to mod I level. Pt performed steps yesterday without concern so he deferred steps today. Pt only needed minimal assistance for brace. Progression toward goals:  [X]      Improving appropriately and progressing toward goals  [ ]      Improving slowly and progressing toward goals  [ ]      Not making progress toward goals and plan of care will be adjusted       PLAN:  Patient continues to benefit from skilled intervention to address the above impairments. Continue treatment per established plan of care.   Discharge Recommendations:  None    Further Equipment Recommendations for Discharge:  none       SUBJECTIVE:   Patient stated my stomach is bothering me.    The patient stated 3/3 back precautions. Reviewed all 3 with patient. OBJECTIVE DATA SUMMARY:   Critical Behavior:  Neurologic State: Alert  Orientation Level: Oriented X4  Cognition: Appropriate decision making  Safety/Judgement: Awareness of environment, Fall prevention, Home safety, Insight into deficits  Functional Mobility Training:  Bed Mobility:  Log Rolling: Modified independent  Supine to Sit: Modified independent  Sit to Supine: Modified independent                       Brace donned with  minimal assistance/contact guard assist   Transfers:  Sit to Stand: Modified independent  Stand to Sit: Modified independent                             Balance:  Sitting: Intact  Standing: Intact  Ambulation/Gait Training:  Distance (ft): 300 Feet (ft)  Assistive Device: Brace/Splint;Gait belt  Ambulation - Level of Assistance: Supervision        Gait Abnormalities: Antalgic;Decreased step clearance              Speed/Vane: Pace decreased (<100 feet/min); Slow                       Stairs: Therapeutic Exercises:      Pain:  Pain Scale 1: Numeric (0 - 10)  Pain Intensity 1: 2  Pain Location 1: Back  Pain Orientation 1: Lower  Pain Description 1: Aching  Pain Intervention(s) 1: Distraction; Emotional support;Relaxation technique;Repositioned; Rest  Activity Tolerance:   Limited   Please refer to the flowsheet for vital signs taken during this treatment.      After treatment: pt in the bathroom   [ ] Patient left in no apparent distress sitting up in chair  [ ] Patient left in no apparent distress in bed  [X] Call bell left within reach  [X] Nursing notified  [ ] Caregiver present  [ ] Bed alarm activated      COMMUNICATION/COLLABORATION:   The patients plan of care was discussed with: Registered Nurse     Vandana Ramirez PTA   Time Calculation: 16 mins

## 2017-09-24 VITALS
OXYGEN SATURATION: 98 % | DIASTOLIC BLOOD PRESSURE: 73 MMHG | BODY MASS INDEX: 23.8 KG/M2 | RESPIRATION RATE: 18 BRPM | HEIGHT: 71 IN | TEMPERATURE: 98.2 F | SYSTOLIC BLOOD PRESSURE: 135 MMHG | WEIGHT: 169.97 LBS | HEART RATE: 62 BPM

## 2017-09-24 PROCEDURE — 74011250637 HC RX REV CODE- 250/637: Performed by: ORTHOPAEDIC SURGERY

## 2017-09-24 PROCEDURE — 97535 SELF CARE MNGMENT TRAINING: CPT

## 2017-09-24 PROCEDURE — 74011250637 HC RX REV CODE- 250/637: Performed by: PHYSICIAN ASSISTANT

## 2017-09-24 RX ORDER — AMOXICILLIN 250 MG
1 CAPSULE ORAL 2 TIMES DAILY
Qty: 30 TAB | Refills: 0 | Status: SHIPPED | OUTPATIENT
Start: 2017-09-24

## 2017-09-24 RX ORDER — CYCLOBENZAPRINE HCL 10 MG
10 TABLET ORAL 3 TIMES DAILY
Qty: 30 TAB | Refills: 0 | Status: SHIPPED | OUTPATIENT
Start: 2017-09-24

## 2017-09-24 RX ORDER — OXYCODONE HYDROCHLORIDE 5 MG/1
5-10 TABLET ORAL
Qty: 80 TAB | Refills: 0 | Status: SHIPPED | OUTPATIENT
Start: 2017-09-24

## 2017-09-24 RX ADMIN — CYCLOBENZAPRINE HYDROCHLORIDE 10 MG: 10 TABLET, FILM COATED ORAL at 08:40

## 2017-09-24 RX ADMIN — ACETAMINOPHEN 650 MG: 325 TABLET, FILM COATED ORAL at 00:15

## 2017-09-24 RX ADMIN — DOCUSATE SODIUM AND SENNOSIDES 1 TABLET: 8.6; 5 TABLET, FILM COATED ORAL at 08:40

## 2017-09-24 RX ADMIN — Medication 20 MG: at 07:15

## 2017-09-24 RX ADMIN — OXYCODONE HYDROCHLORIDE 10 MG: 5 TABLET ORAL at 12:08

## 2017-09-24 RX ADMIN — ACETAMINOPHEN 650 MG: 325 TABLET, FILM COATED ORAL at 05:28

## 2017-09-24 RX ADMIN — OXYCODONE HYDROCHLORIDE 10 MG: 5 TABLET ORAL at 02:32

## 2017-09-24 RX ADMIN — OXYCODONE HYDROCHLORIDE 10 MG: 5 TABLET ORAL at 08:41

## 2017-09-24 RX ADMIN — OXYCODONE HYDROCHLORIDE 5 MG: 5 TABLET ORAL at 05:28

## 2017-09-24 RX ADMIN — ACETAMINOPHEN 650 MG: 325 TABLET, FILM COATED ORAL at 12:08

## 2017-09-24 NOTE — PROGRESS NOTES
ORTHOPAEDIC LUMBAR FUSION PROGRESS NOTE    NAME: Jono Maravilla   :  1960   MRN:  [id number]   DATE:  2017    POD: 3 Days Post-Op  S/P: Procedure(s):  LUMBAR LAMINECTOMY L2-S1, POSTERIOR SPINAL FUSION L4-S1    SUBJECTIVE:    Pt resting in bed. States his hip pain is much better. No complaints today. States he would like to go home. Is concerned he didn't have a BM yet, and would like a stool softener.     Recent Labs      17   0331   HGB  12.4     Patient Vitals for the past 12 hrs:   BP Temp Pulse Resp SpO2   17 0801 (P) 135/73 (P) 98.2 °F (36.8 °C) (P) 62 (P) 18 (P) 98 %   17 0530 129/81 98.5 °F (36.9 °C) 60 18 97 %     EXAM:  Actively moving bilateral lower extremities    PLAN:  PO pain medications as needed  Will order flexeril to help with hip muscle tightness  PT/OT, OOB  Will order colace to go home with  Ice packs to back for PRN pain  VTE prophylaxis: SCDs and TEDs  Encourage use of ICS   Remove drain once output decreases  Discharge to home today    Dexter Granger DO

## 2017-09-24 NOTE — PROGRESS NOTES
Problem: Falls - Risk of  Goal: *Absence of Falls  Document Cady Fall Risk and appropriate interventions in the flowsheet.    Outcome: Progressing Towards Goal  Fall Risk Interventions:  Mobility Interventions: Communicate number of staff needed for ambulation/transfer, Patient to call before getting OOB           Medication Interventions: Patient to call before getting OOB, Teach patient to arise slowly, Utilize gait belt for transfers/ambulation     Elimination Interventions: Call light in reach, Patient to call for help with toileting needs, Urinal in reach

## 2017-09-24 NOTE — PROGRESS NOTES
Problem: Self Care Deficits Care Plan (Adult)  Goal: *Acute Goals and Plan of Care (Insert Text)  Occupational Therapy Goals  Initiated 9/21/2017  1. Patient will perform lower body dressing with modified independence using AE PRN within 7 days. 2. Patient will perform toileting with modified independence using most appropriate DME within 7 days. 3. Patient will perform gathering ADLs 4/4 high and low without cues precautions at modified independence within 7 days. 4. Patient will don/doff back brace at modified independence within 7 days. 5. Patient will verbalize/demonstrate 3/3 back precautions during ADL tasks without cues within 7 days. OCCUPATIONAL THERAPY TREATMENT/DISCHARGE  Patient: Maida Aden (68 y.o. male)  Date: 9/24/2017  Diagnosis: SPINAL STENOSIS L2-S1  Spinal stenosis <principal problem not specified>  Procedure(s) (LRB):  LUMBAR LAMINECTOMY L2-S1, POSTERIOR SPINAL FUSION L4-S1 (N/A) 4 Days Post-Op  Precautions: Back, Fall      ASSESSMENT:  Patient received sitting EOB eating breakfast. Patient reporting he would like to d/c home today, declining HHPT services (PT not recommending per chart review). Patient reporting he plans to have assist from sister or neighbor upon returning home. Patient states he has all needed equipment, but did emphasize that patient can complete lower body dressing with tailor sit if he can maintain back precautions during task. Educated on energy conservation techniques, home modifications, and car transfer techniques. Additionally, provided education on compensatory techniques for donning TLSO. Patient reporting no needs and appearing anxious to d/c home. Will complete orders at this time as patient has no further concerns.   Progression toward goals:  [X]       Improving appropriately and progressing toward goals  [ ]       Improving slowly and progressing toward goals  [ ]       Not making progress toward goals and plan of care will be adjusted PLAN:  Patient will be discharged from occupational therapy at this time. Rationale for discharge:  [X]   Goals Achieved  [ ]   Arsh Holley  [ ]   Patient not participating in therapy  [ ]   Other:  Discharge Recommendations:  None  Further Equipment Recommendations for Discharge:  None noted       SUBJECTIVE:   Patient stated I have all of that equipment, or I'll figure out how to make that sock aide with a 2L soda bottle.       OBJECTIVE DATA SUMMARY:   Cognitive/Behavioral Status:  Neurologic State: Alert  Orientation Level: Oriented X4  Cognition: Appropriate decision making; Appropriate for age attention/concentration; Appropriate safety awareness; Follows commands              Functional Mobility and Transfers for ADLs:  Bed Mobility:  Supine to Sit: Modified independent  Sit to Supine: Modified independent     Balance:  Sitting: Intact  Standing: Intact     ADL Intervention:     Upper Body Dressing Assistance  Orthotics(Brace): Compensatory technique training     Pain:  Pain Scale 1: Numeric (0 - 10)  Pain Intensity 1: 2  Pain Location 1: Back  Pain Orientation 1: Lower  Pain Description 1: Aching  Pain Intervention(s) 1: Medication (see MAR)  Activity Tolerance:   Good. Please refer to the flowsheet for vital signs taken during this treatment. After treatment:   [X] Patient left in no apparent distress sitting up on EOB  [ ] Patient left in no apparent distress in bed  [X] Call bell left within reach  [X] Nursing notified  [ ] Caregiver present  [ ] Bed alarm activated      COMMUNICATION/COLLABORATION:   The patients plan of care was discussed with: Physical Therapist and Registered Nurse     Dessie Libman, OT  Time Calculation: 10 mins        . otact

## 2017-09-24 NOTE — PROGRESS NOTES
[address]  SBAR Bundled Payment Handoff     FROM:                                TO: Home with home health agency                                                      ([address] [place] [address] or Facility name)  [address]  [address] [facility] [address]  Dept: [id number]: [phone]                                      Room#:  65/56                                                      Discharging Nurse: Kasandra Whitfield RN  Unit Ph#:[id number]         SITUATION      ASAScore: ASA 2 - Patient with mild systemic disease with no functional limitations    Admitted:  9/20/2017  Hospital Day: 5      Attending Provider:  Maryjane Cervantes MD     Consultations:  IP CONSULT TO HOSPITALIST    PCP:  Andriy Vargas MD   [phone]     Admitting Dx:  SPINAL STENOSIS L2-S1  Spinal stenosis       Active Problems:    Spinal stenosis (9/20/2017)      4 Days Post-Op of   Procedure(s):  LUMBAR LAMINECTOMY L2-S1, POSTERIOR SPINAL FUSION L4-S1   BY: Maryjane Cervantes MD             ON: 9/20/2017                  Code Status: Full Code             Advance Directive? No Doesnt Have (Send w/patient)     Isolation:  There are currently no Active Isolations       MDRO: No current active infections    BACKGROUND     Allergies:   Allergies   Allergen Reactions    Hydrocodone Itching       Past Medical History:   Diagnosis Date    Arrhythmia     hx AF    Arthritis     Chronic kidney disease     STONES    Chronic pain     both knees, hips, left shoulder    GERD (gastroesophageal reflux disease)     well controlled    Psychiatric disorder     bipolar disorder       Past Surgical History:   Procedure Laterality Date    HX CATARACT REMOVAL      HX GI      COLONOSCOPY    HX HEENT Left     DETACHED RETINA    HX KNEE ARTHROSCOPY      bilateral    HX ORTHOPAEDIC      right ankle reconstruction with hardware    HX ORTHOPAEDIC  2003    left hand reconstruction (trauma)    HX ORTHOPAEDIC      R HIP REPLACED    HX ORTHOPAEDIC      BOTH SHOULDER SURGERY       Prior to Admission Medications   Prescriptions Last Dose Informant Patient Reported? Taking? OMEPRAZOLE PO 9/13/2017 at Unknown time  Yes Yes   Sig: Take 1 Tab by mouth every morning. QUEtiapine (SEROQUEL) 300 mg tablet 9/13/2017 at Unknown time  Yes Yes   Sig: Take 300 mg by mouth daily. oxyCODONE-acetaminophen (PERCOCET) 5-325 mg per tablet 9/19/2017 at Unknown time  Yes Yes   Sig: Take 2 Tabs by mouth every four (4) hours as needed for Pain. Facility-Administered Medications: None       Vaccinations: There is no immunization history on file for this patient. ASSESSMENT   Age: 60 y.o. Gender: male        Height: Height: 5' 11\" (180.3 cm)                    Weight:Weight: 77.1 kg (169 lb 15.6 oz)     Patient Vitals for the past 8 hrs:   Temp Pulse Resp BP SpO2   09/24/17 0801 98.2 °F (36.8 °C) 62 18 135/73 98 %   09/24/17 0530 98.5 °F (36.9 °C) 60 18 129/81 97 %            Active Orders   Diet    DIET REGULAR       Orientation: Orientation Level: Oriented X4    Active Lines/Drains:  (Peg Tube / Marcano / CL or S/L?):no    Urinary Status: Voiding      Last BM: Last Bowel Movement Date: 09/20/17     Skin Integrity: Incision (comment) (surgical back)   Wound Back Lower;Medial-DRESSING STATUS: Changed per order    Wound Back Lower;Medial-DRESSING TYPE: Dry dressing    Mobility: Slightly limited   Weight Bearing Status: WBAT (Weight Bearing as Tolerated)      Gait Training  Assistive Device: Brace/Splint, Gait belt  Ambulation - Level of Assistance: Supervision  Distance (ft): 300 Feet (ft)  Stairs - Level of Assistance: Modified independent  Number of Stairs Trained: 3  Rail Use: None     On Anticoagulation?    no  Pain Medications given:  oxycodone  10 mg                              Last dose: 9/24/2017 at  1200    Lab Results   Component Value Date/Time    Glucose 132 09/21/2017 04:20 AM    Hemoglobin A1c 5.7 08/30/2017 01:15 PM    INR 1.0 08/30/2017 01:15 PM    INR 1.0 01/22/2014 09:59 AM    HGB 12.4 09/22/2017 03:31 AM    HGB 13.9 09/21/2017 04:20 AM    HGB 16.3 08/30/2017 01:15 PM    HGB 10.5 02/06/2014 05:40 AM       Readmission Risks:  Score:         RECOMMENDATION     See After Visit Summary (AVS) for:  · Discharge instructions  · After [address]   · Medication Reconciliation          [facility] Orthopaedic Nurse Navigator  KENYON Schmitz, RN-BC       Office  [phone]  Cell      [phone]  Fax      [phone]  [email or web address]             . Erik

## 2017-09-24 NOTE — PROGRESS NOTES
Physical Therapy Note     Chart reviewed and discussed with RN and OT. Patient is scheduled for discharge home today and has currently met all goals during therapy sessions. During conversation with patient, no mobility concerns were voiced and all questions were answered. PT is completing orders at this time. Please re-consult if patient status changes.     Kurt Arzola PT, DPT

## 2017-09-26 ENCOUNTER — TELEPHONE (OUTPATIENT)
Dept: MEDSURG UNIT | Age: 57
End: 2017-09-26

## 2017-09-26 NOTE — DISCHARGE SUMMARY
[address]   [address] [place], [place] [address]    DISCHARGE SUMMARY     Patient: Josesito Burkett                             Medical Record Number: [id number]                : 1960  Age: 60 y.o. Admit Date: 2017  Discharge Date: 2017  Admission Diagnosis: SPINAL STENOSIS L2-S1  Spinal stenosis  Discharge Diagnosis: SPINAL STENOSIS L2-S1  Procedures: Procedure(s):  LUMBAR LAMINECTOMY L2-S1, POSTERIOR SPINAL FUSION L4-S1  Surgeon: Itzel Sevilla MD  Anesthesia: General  Complications: None     History of Present Illness: A 47-year-old gentleman presented with a case of severe lumbar spinal stenosis. Severe limitation of function related to activities of standing and walking as well as a subjective sense of weakness. Has imaging study demonstrating severe canal narrowing related to degenerative change, but also an accompanying epidural lipomatosis; he has severe bilateral neural foraminal narrowing, L4-L5 and L5-S1. Given the extent of his pain, dysfunction and his findings clinically and radiologically, a lumbar decompression and stabilization procedure offered. Potential benefits and complications reviewed. He opted to proceed with the surgery. Hospital Course:  Josesito Burkett tolerated the procedure well. He was transferred to the Spinal Unit from the recovery room in stable condition. On postoperative day 1, the dressing was clean and dry, he was neurovascularly intact and afebrile, and his vital signs revealed bradycardia. An EKG was obtained. Other than bradycardia, the EKG was unremarkable. He refused telemetry. He slowly progressed with physical therapy. On postoperative days 2-4, he made good progress with physical therapy without incident.       Hemoglobin prior to discharge:  Lab Results   Component Value Date/Time    HGB 12.4 2017 03:31 AM      Josesito Burkett made excellent progress with physical therapy and was discharged to home with home health in stable condition on postoperative day 4. He was given routine postoperative instructions and advised to follow up in my office in 2 weeks following discharge home. He was given a prescription for pain medication. Discharge Medications:  Cannot display discharge medications since this patient is not currently admitted.     Signed by: Garry Mayo PA-C  9/26/2017

## 2017-09-26 NOTE — TELEPHONE ENCOUNTER
9/26 - called and spoke with patient about post op recovery at home. States his \"pain is getting better everyday\". Nursing is changing is dressing daily. Asked patient if he had issues with constipation at home; last BM was 9/20 - recommended MOM, mag citrate, or even an enema if he doesn't move his bowels soon. Left name and number for patient to call back if he had any questions or concerns at home.

## 2018-02-21 ENCOUNTER — HOSPITAL ENCOUNTER (OUTPATIENT)
Dept: MRI IMAGING | Age: 58
Discharge: HOME | End: 2018-02-21
Attending: ORTHOPAEDIC SURGERY
Payer: MEDICARE

## 2018-02-21 DIAGNOSIS — M25.562 LEFT KNEE PAIN: ICD-10-CM

## 2018-02-21 PROCEDURE — 73721 MRI JNT OF LWR EXTRE W/O DYE: CPT

## 2021-04-27 ENCOUNTER — TELEPHONE (OUTPATIENT)
Dept: CARDIOLOGY CLINIC | Age: 61
End: 2021-04-27

## 2021-04-29 NOTE — TELEPHONE ENCOUNTER
----- Message from Maldonado Manzo sent at 4/27/2021  1:30 PM EDT -----  [address],    Tried calling pt went straight to Voice mail and the mail box is full      Jeanice Epley   ----- Message -----  From: Irish Dandy, LPN  Sent: 1/98/8577   1:07 PM EDT  To: Maldonado Manzo    Have you gotten an appointment request for this patient? We received an authorization for him. I'm going to fax the information to you as well.    Thank you,  Benita

## 2021-05-04 ENCOUNTER — TELEPHONE (OUTPATIENT)
Dept: CARDIOLOGY CLINIC | Age: 61
End: 2021-05-04

## 2021-05-04 NOTE — TELEPHONE ENCOUNTER
----- Message from Ángel Gongora sent at 5/4/2021 10:34 AM EDT -----  Called this pt on 4/26/21 NA and the VM is full I just tried again and the same thing so I'm filing this one   Maryam Galvez  ----- Message -----  From: Karthik Quintanilla LPN  Sent: 7/31/9373   1:51 PM EDT  To: Ángel Gongora    Thank you.   ----- Message -----  From: Jose Roberto Mabry  Sent: 4/27/2021   1:30 PM EDT  To: CICI Harvey,    Tried calling pt went straight to Voice mail and the mail box is full      Maryam Galvez   ----- Message -----  From: Karthik Quintanilla LPN  Sent: 8/75/2074   1:07 PM EDT  To: Ángel Gongora    Have you gotten an appointment request for this patient? We received an authorization for him. I'm going to fax the information to you as well.    Thank you,  Benita

## 2021-05-04 NOTE — TELEPHONE ENCOUNTER
Tried calling Pt on 4/26/21NA and the VM is full   Tried calling pt again on 5/4/21 same thing   To ada dozier Np appt w/Dr Ras Paz sll

## 2022-03-19 PROBLEM — M48.00 SPINAL STENOSIS: Status: ACTIVE | Noted: 2017-09-20

## 2023-05-16 ENCOUNTER — HOSPITAL ENCOUNTER (INPATIENT)
Facility: HOSPITAL | Age: 63
LOS: 2 days | Discharge: HOME | DRG: 309 | End: 2023-05-18
Attending: EMERGENCY MEDICINE | Admitting: INTERNAL MEDICINE
Payer: MEDICARE

## 2023-05-16 ENCOUNTER — APPOINTMENT (OUTPATIENT)
Facility: HOSPITAL | Age: 63
DRG: 309 | End: 2023-05-16
Payer: MEDICARE

## 2023-05-16 DIAGNOSIS — R00.1 SYMPTOMATIC BRADYCARDIA: Primary | ICD-10-CM

## 2023-05-16 LAB
ALBUMIN SERPL-MCNC: 3.6 G/DL (ref 3.5–5)
ALBUMIN/GLOB SERPL: 0.9 (ref 1.1–2.2)
ALP SERPL-CCNC: 83 U/L (ref 45–117)
ALT SERPL-CCNC: 26 U/L (ref 12–78)
ANION GAP SERPL CALC-SCNC: 3 MMOL/L (ref 5–15)
AST SERPL W P-5'-P-CCNC: 22 U/L (ref 15–37)
BASOPHILS # BLD: 0.1 K/UL (ref 0–0.1)
BASOPHILS NFR BLD: 1 % (ref 0–1)
BILIRUB SERPL-MCNC: 0.4 MG/DL (ref 0.2–1)
BNP SERPL-MCNC: 278 PG/ML
BUN SERPL-MCNC: 17 MG/DL (ref 6–20)
BUN/CREAT SERPL: 19 (ref 12–20)
CA-I BLD-MCNC: 9.1 MG/DL (ref 8.5–10.1)
CHLORIDE SERPL-SCNC: 105 MMOL/L (ref 97–108)
CO2 SERPL-SCNC: 28 MMOL/L (ref 21–32)
CREAT SERPL-MCNC: 0.91 MG/DL (ref 0.7–1.3)
DIFFERENTIAL METHOD BLD: ABNORMAL
EOSINOPHIL # BLD: 0.2 K/UL (ref 0–0.4)
EOSINOPHIL NFR BLD: 3 % (ref 0–7)
ERYTHROCYTE [DISTWIDTH] IN BLOOD BY AUTOMATED COUNT: 14.6 % (ref 11.5–14.5)
GLOBULIN SER CALC-MCNC: 3.8 G/DL (ref 2–4)
GLUCOSE SERPL-MCNC: 105 MG/DL (ref 65–100)
HCT VFR BLD AUTO: 39.8 % (ref 36.6–50.3)
HGB BLD-MCNC: 13.3 G/DL (ref 12.1–17)
IMM GRANULOCYTES # BLD AUTO: 0 K/UL (ref 0–0.04)
IMM GRANULOCYTES NFR BLD AUTO: 0 % (ref 0–0.5)
LYMPHOCYTES # BLD: 1.3 K/UL (ref 0.8–3.5)
LYMPHOCYTES NFR BLD: 19 % (ref 12–49)
MAGNESIUM SERPL-MCNC: 2.2 MG/DL (ref 1.6–2.4)
MCH RBC QN AUTO: 30.4 PG (ref 26–34)
MCHC RBC AUTO-ENTMCNC: 33.4 G/DL (ref 30–36.5)
MCV RBC AUTO: 91.1 FL (ref 80–99)
MONOCYTES # BLD: 0.8 K/UL (ref 0–1)
MONOCYTES NFR BLD: 11 % (ref 5–13)
NEUTS SEG # BLD: 4.7 K/UL (ref 1.8–8)
NEUTS SEG NFR BLD: 66 % (ref 32–75)
NRBC # BLD: 0 K/UL (ref 0–0.01)
NRBC BLD-RTO: 0 PER 100 WBC
PLATELET # BLD AUTO: 262 K/UL (ref 150–400)
PMV BLD AUTO: 9.6 FL (ref 8.9–12.9)
POTASSIUM SERPL-SCNC: 4.6 MMOL/L (ref 3.5–5.1)
PROT SERPL-MCNC: 7.4 G/DL (ref 6.4–8.2)
RBC # BLD AUTO: 4.37 M/UL (ref 4.1–5.7)
SODIUM SERPL-SCNC: 136 MMOL/L (ref 136–145)
TROPONIN I SERPL HS-MCNC: 29 NG/L (ref 0–76)
TSH SERPL DL<=0.05 MIU/L-ACNC: 1.18 UIU/ML (ref 0.36–3.74)
WBC # BLD AUTO: 7 K/UL (ref 4.1–11.1)

## 2023-05-16 PROCEDURE — 36415 COLL VENOUS BLD VENIPUNCTURE: CPT

## 2023-05-16 PROCEDURE — 83735 ASSAY OF MAGNESIUM: CPT

## 2023-05-16 PROCEDURE — 80053 COMPREHEN METABOLIC PANEL: CPT

## 2023-05-16 PROCEDURE — 83880 ASSAY OF NATRIURETIC PEPTIDE: CPT

## 2023-05-16 PROCEDURE — 71275 CT ANGIOGRAPHY CHEST: CPT

## 2023-05-16 PROCEDURE — 99285 EMERGENCY DEPT VISIT HI MDM: CPT

## 2023-05-16 PROCEDURE — 84443 ASSAY THYROID STIM HORMONE: CPT

## 2023-05-16 PROCEDURE — 85025 COMPLETE CBC W/AUTO DIFF WBC: CPT

## 2023-05-16 PROCEDURE — 6360000004 HC RX CONTRAST MEDICATION: Performed by: EMERGENCY MEDICINE

## 2023-05-16 PROCEDURE — 93005 ELECTROCARDIOGRAM TRACING: CPT | Performed by: EMERGENCY MEDICINE

## 2023-05-16 PROCEDURE — 84484 ASSAY OF TROPONIN QUANT: CPT

## 2023-05-16 RX ORDER — POLYETHYLENE GLYCOL 3350 17 G/17G
17 POWDER, FOR SOLUTION ORAL DAILY PRN
Status: DISCONTINUED | OUTPATIENT
Start: 2023-05-16 | End: 2023-05-18 | Stop reason: HOSPADM

## 2023-05-16 RX ORDER — SODIUM CHLORIDE 9 MG/ML
INJECTION, SOLUTION INTRAVENOUS PRN
Status: DISCONTINUED | OUTPATIENT
Start: 2023-05-16 | End: 2023-05-18 | Stop reason: HOSPADM

## 2023-05-16 RX ORDER — SODIUM CHLORIDE 0.9 % (FLUSH) 0.9 %
5-40 SYRINGE (ML) INJECTION PRN
Status: DISCONTINUED | OUTPATIENT
Start: 2023-05-16 | End: 2023-05-18 | Stop reason: HOSPADM

## 2023-05-16 RX ORDER — SODIUM CHLORIDE 0.9 % (FLUSH) 0.9 %
5-40 SYRINGE (ML) INJECTION EVERY 12 HOURS SCHEDULED
Status: DISCONTINUED | OUTPATIENT
Start: 2023-05-16 | End: 2023-05-18 | Stop reason: HOSPADM

## 2023-05-16 RX ORDER — ACETAMINOPHEN 325 MG/1
650 TABLET ORAL EVERY 6 HOURS PRN
Status: DISCONTINUED | OUTPATIENT
Start: 2023-05-16 | End: 2023-05-18 | Stop reason: HOSPADM

## 2023-05-16 RX ORDER — ONDANSETRON 4 MG/1
4 TABLET, ORALLY DISINTEGRATING ORAL EVERY 8 HOURS PRN
Status: DISCONTINUED | OUTPATIENT
Start: 2023-05-16 | End: 2023-05-18 | Stop reason: HOSPADM

## 2023-05-16 RX ORDER — ACETAMINOPHEN 650 MG/1
650 SUPPOSITORY RECTAL EVERY 6 HOURS PRN
Status: DISCONTINUED | OUTPATIENT
Start: 2023-05-16 | End: 2023-05-18 | Stop reason: HOSPADM

## 2023-05-16 RX ORDER — ENOXAPARIN SODIUM 100 MG/ML
40 INJECTION SUBCUTANEOUS DAILY
Status: DISCONTINUED | OUTPATIENT
Start: 2023-05-17 | End: 2023-05-18 | Stop reason: HOSPADM

## 2023-05-16 RX ORDER — ONDANSETRON 2 MG/ML
4 INJECTION INTRAMUSCULAR; INTRAVENOUS EVERY 6 HOURS PRN
Status: DISCONTINUED | OUTPATIENT
Start: 2023-05-16 | End: 2023-05-18 | Stop reason: HOSPADM

## 2023-05-16 RX ADMIN — IOPAMIDOL 100 ML: 755 INJECTION, SOLUTION INTRAVENOUS at 20:32

## 2023-05-16 ASSESSMENT — LIFESTYLE VARIABLES
HOW OFTEN DO YOU HAVE A DRINK CONTAINING ALCOHOL: NEVER
HOW MANY STANDARD DRINKS CONTAINING ALCOHOL DO YOU HAVE ON A TYPICAL DAY: PATIENT DOES NOT DRINK

## 2023-05-16 NOTE — ED TRIAGE NOTES
Per EMS patient has felt very weak for the past day. Had a syncopal episode this morning with +LOC and felt better but then started to feel worse again.

## 2023-05-16 NOTE — ED PROVIDER NOTES
[facility] EMERGENCY DEPT  EMERGENCY DEPARTMENT HISTORY AND PHYSICAL EXAM      Date: 5/16/2023  Patient Name: Shay Weiss  MRN: [id number]  [place] 1960  Date of evaluation: 5/16/2023  Provider: Queenie Ruano DO   Note Started: 7:22 PM EDT 5/16/23    HISTORY OF PRESENT ILLNESS     Chief Complaint   Patient presents with    Fatigue     History Provided By: Patient    HPI: Shay Weiss is a 57 y.o. male with history of DVT who presents with dizziness. States he had 2 episodes of significant dizziness earlier today. States he has not been taking his medicine for his DVT. Denies chest pain or shortness of breath. PAST MEDICAL HISTORY   Past Medical History:  DVT    Past Surgical History:  History reviewed. No pertinent surgical history. Family History:  History reviewed. No pertinent family history. Social History: Allergies:  No Known Allergies    PCP: General Willy MD    Current Meds:   No current facility-administered medications for this encounter. No current outpatient medications on file. Social Determinants of Health:   Social Determinants of Health     Tobacco Use: Not on file   Alcohol Use: Not At Risk    Frequency of Alcohol Consumption: Never    Average Number of Drinks: Patient does not drink    Frequency of Binge Drinking: Never   Financial Resource Strain: Not on file   Food Insecurity: Not on file   Transportation Needs: Not on file   Physical Activity: Not on file   Stress: Not on file   Social Connections: Not on file   Intimate Partner Violence: Not on file   Depression: Not on file   Housing Stability: Not on file     PHYSICAL EXAM   Constitutional: No acute distress. Well-nourished. Skin: No rash. ENT: No rhinorrhea. No cough. Head is normocephalic and atraumatic. Eye: No proptosis or conjunctival injections. Respiratory: No apparent respiratory distress. Gastrointestinal: Nondistended. Musculoskeletal: No obvious bony deformities. Psychiatric: Cooperative.

## 2023-05-17 ENCOUNTER — APPOINTMENT (OUTPATIENT)
Facility: HOSPITAL | Age: 63
DRG: 309 | End: 2023-05-17
Payer: MEDICARE

## 2023-05-17 LAB
ANION GAP SERPL CALC-SCNC: 4 MMOL/L (ref 5–15)
BASOPHILS # BLD: 0.1 K/UL (ref 0–0.1)
BASOPHILS NFR BLD: 1 % (ref 0–1)
BUN SERPL-MCNC: 17 MG/DL (ref 6–20)
BUN/CREAT SERPL: 20 (ref 12–20)
CA-I BLD-MCNC: 8.6 MG/DL (ref 8.5–10.1)
CHLORIDE SERPL-SCNC: 108 MMOL/L (ref 97–108)
CO2 SERPL-SCNC: 27 MMOL/L (ref 21–32)
CREAT SERPL-MCNC: 0.87 MG/DL (ref 0.7–1.3)
DIFFERENTIAL METHOD BLD: NORMAL
ECHO AO ASC DIAM: 3.6 CM
ECHO AO ASCENDING AORTA INDEX: 1.89 CM/M2
ECHO AO ROOT DIAM: 3.3 CM
ECHO AO ROOT INDEX: 1.74 CM/M2
ECHO AV AREA PEAK VELOCITY: 1.9 CM2
ECHO AV AREA VTI: 1.8 CM2
ECHO AV AREA/BSA PEAK VELOCITY: 1 CM2/M2
ECHO AV AREA/BSA VTI: 0.9 CM2/M2
ECHO AV CUSP MM: 1.2 CM
ECHO AV MEAN GRADIENT: 9 MMHG
ECHO AV MEAN VELOCITY: 1.3 M/S
ECHO AV PEAK GRADIENT: 16 MMHG
ECHO AV PEAK VELOCITY: 2 M/S
ECHO AV VELOCITY RATIO: 0.5
ECHO AV VTI: 48.2 CM
ECHO BSA: 1.97 M2
ECHO LA AREA 2C: 15.5 CM2
ECHO LA AREA 4C: 18.2 CM2
ECHO LA DIAMETER INDEX: 1.74 CM/M2
ECHO LA DIAMETER: 3.3 CM
ECHO LA MAJOR AXIS: 5.9 CM
ECHO LA MINOR AXIS: 4.9 CM
ECHO LA TO AORTIC ROOT RATIO: 1
ECHO LA VOL 2C: 41 ML (ref 18–58)
ECHO LA VOL 4C: 45 ML (ref 18–58)
ECHO LA VOL BP: 46 ML (ref 18–58)
ECHO LA VOL/BSA BIPLANE: 24 ML/M2 (ref 16–34)
ECHO LA VOLUME INDEX A2C: 22 ML/M2 (ref 16–34)
ECHO LA VOLUME INDEX A4C: 24 ML/M2 (ref 16–34)
ECHO LV E' LATERAL VELOCITY: 12 CM/S
ECHO LV E' SEPTAL VELOCITY: 9 CM/S
ECHO LV EDV A2C: 94 ML
ECHO LV EDV A4C: 115 ML
ECHO LV EDV INDEX A4C: 61 ML/M2
ECHO LV EDV NDEX A2C: 49 ML/M2
ECHO LV EJECTION FRACTION A2C: 54 %
ECHO LV EJECTION FRACTION A4C: 58 %
ECHO LV EJECTION FRACTION BIPLANE: 55 % (ref 55–100)
ECHO LV ESV A2C: 43 ML
ECHO LV ESV A4C: 49 ML
ECHO LV ESV INDEX A2C: 23 ML/M2
ECHO LV ESV INDEX A4C: 26 ML/M2
ECHO LV FRACTIONAL SHORTENING: 34 % (ref 28–44)
ECHO LV INTERNAL DIMENSION DIASTOLE INDEX: 2.63 CM/M2
ECHO LV INTERNAL DIMENSION DIASTOLIC MMODE: 5.5 CM (ref 4.2–5.9)
ECHO LV INTERNAL DIMENSION DIASTOLIC: 5 CM (ref 4.2–5.9)
ECHO LV INTERNAL DIMENSION SYSTOLIC INDEX: 1.74 CM/M2
ECHO LV INTERNAL DIMENSION SYSTOLIC MMODE: 3.5 CM
ECHO LV INTERNAL DIMENSION SYSTOLIC: 3.3 CM
ECHO LV IVSD: 1.1 CM (ref 0.6–1)
ECHO LV MASS 2D: 207.1 G (ref 88–224)
ECHO LV MASS INDEX 2D: 109 G/M2 (ref 49–115)
ECHO LV POSTERIOR WALL DIASTOLIC MMODE: 1.3 CM (ref 0.6–1)
ECHO LV POSTERIOR WALL DIASTOLIC: 1.1 CM (ref 0.6–1)
ECHO LV RELATIVE WALL THICKNESS RATIO: 0.44
ECHO LVOT AREA: 3.8 CM2
ECHO LVOT AV VTI INDEX: 0.47
ECHO LVOT DIAM: 2.2 CM
ECHO LVOT MEAN GRADIENT: 2 MMHG
ECHO LVOT PEAK GRADIENT: 4 MMHG
ECHO LVOT PEAK VELOCITY: 1 M/S
ECHO LVOT STROKE VOLUME INDEX: 45.2 ML/M2
ECHO LVOT SV: 85.9 ML
ECHO LVOT VTI: 22.6 CM
ECHO MV A VELOCITY: 0.93 M/S
ECHO MV E DECELERATION TIME (DT): 256 MS
ECHO MV E VELOCITY: 0.71 M/S
ECHO MV E/A RATIO: 0.76
ECHO MV E/E' LATERAL: 5.92
ECHO MV E/E' RATIO (AVERAGED): 6.9
ECHO MV E/E' SEPTAL: 7.89
ECHO MV REGURGITANT PEAK GRADIENT: 77 MMHG
ECHO MV REGURGITANT PEAK VELOCITY: 4.4 M/S
ECHO RA AREA 4C: 8.2 CM2
ECHO RA END SYSTOLIC VOLUME APICAL 4 CHAMBER INDEX BSA: 8 ML/M2
ECHO RA VOLUME: 15 ML
ECHO RV BASAL DIMENSION: 3.3 CM
ECHO RV LONGITUDINAL DIMENSION: 8.3 CM
ECHO RV MID DIMENSION: 2.8 CM
ECHO RV TAPSE: 3.4 CM (ref 1.7–?)
EKG ATRIAL RATE: 42 BPM
EKG DIAGNOSIS: NORMAL
EKG P AXIS: 66 DEGREES
EKG P-R INTERVAL: 140 MS
EKG Q-T INTERVAL: 476 MS
EKG QRS DURATION: 94 MS
EKG QTC CALCULATION (BAZETT): 397 MS
EKG R AXIS: -37 DEGREES
EKG T AXIS: 13 DEGREES
EKG VENTRICULAR RATE: 42 BPM
EOSINOPHIL # BLD: 0.3 K/UL (ref 0–0.4)
EOSINOPHIL NFR BLD: 5 % (ref 0–7)
ERYTHROCYTE [DISTWIDTH] IN BLOOD BY AUTOMATED COUNT: 14.5 % (ref 11.5–14.5)
GLUCOSE SERPL-MCNC: 121 MG/DL (ref 65–100)
HCT VFR BLD AUTO: 39.3 % (ref 36.6–50.3)
HGB BLD-MCNC: 12.8 G/DL (ref 12.1–17)
IMM GRANULOCYTES # BLD AUTO: 0 K/UL (ref 0–0.04)
IMM GRANULOCYTES NFR BLD AUTO: 0 % (ref 0–0.5)
LYMPHOCYTES # BLD: 1.6 K/UL (ref 0.8–3.5)
LYMPHOCYTES NFR BLD: 29 % (ref 12–49)
MCH RBC QN AUTO: 30.2 PG (ref 26–34)
MCHC RBC AUTO-ENTMCNC: 32.6 G/DL (ref 30–36.5)
MCV RBC AUTO: 92.7 FL (ref 80–99)
MONOCYTES # BLD: 0.7 K/UL (ref 0–1)
MONOCYTES NFR BLD: 13 % (ref 5–13)
NEUTS SEG # BLD: 2.9 K/UL (ref 1.8–8)
NEUTS SEG NFR BLD: 52 % (ref 32–75)
NRBC # BLD: 0 K/UL (ref 0–0.01)
NRBC BLD-RTO: 0 PER 100 WBC
PLATELET # BLD AUTO: 296 K/UL (ref 150–400)
PMV BLD AUTO: 9.4 FL (ref 8.9–12.9)
POTASSIUM SERPL-SCNC: 3.4 MMOL/L (ref 3.5–5.1)
RBC # BLD AUTO: 4.24 M/UL (ref 4.1–5.7)
SODIUM SERPL-SCNC: 139 MMOL/L (ref 136–145)
WBC # BLD AUTO: 5.6 K/UL (ref 4.1–11.1)

## 2023-05-17 PROCEDURE — 2580000003 HC RX 258: Performed by: INTERNAL MEDICINE

## 2023-05-17 PROCEDURE — 93306 TTE W/DOPPLER COMPLETE: CPT

## 2023-05-17 PROCEDURE — 6370000000 HC RX 637 (ALT 250 FOR IP): Performed by: INTERNAL MEDICINE

## 2023-05-17 PROCEDURE — 6360000002 HC RX W HCPCS: Performed by: INTERNAL MEDICINE

## 2023-05-17 PROCEDURE — 80048 BASIC METABOLIC PNL TOTAL CA: CPT

## 2023-05-17 PROCEDURE — 36415 COLL VENOUS BLD VENIPUNCTURE: CPT

## 2023-05-17 PROCEDURE — 2060000000 HC ICU INTERMEDIATE R&B

## 2023-05-17 PROCEDURE — 93010 ELECTROCARDIOGRAM REPORT: CPT | Performed by: INTERNAL MEDICINE

## 2023-05-17 PROCEDURE — 85025 COMPLETE CBC W/AUTO DIFF WBC: CPT

## 2023-05-17 RX ORDER — ASPIRIN 81 MG/1
81 TABLET, CHEWABLE ORAL DAILY
Status: DISCONTINUED | OUTPATIENT
Start: 2023-05-17 | End: 2023-05-18 | Stop reason: HOSPADM

## 2023-05-17 RX ORDER — MECLIZINE HCL 12.5 MG/1
12.5 TABLET ORAL 3 TIMES DAILY PRN
Status: DISCONTINUED | OUTPATIENT
Start: 2023-05-17 | End: 2023-05-18 | Stop reason: HOSPADM

## 2023-05-17 RX ORDER — ATORVASTATIN CALCIUM 40 MG/1
40 TABLET, FILM COATED ORAL NIGHTLY
Status: DISCONTINUED | OUTPATIENT
Start: 2023-05-17 | End: 2023-05-18 | Stop reason: HOSPADM

## 2023-05-17 RX ADMIN — ATORVASTATIN CALCIUM 40 MG: 40 TABLET, FILM COATED ORAL at 03:22

## 2023-05-17 RX ADMIN — ATORVASTATIN CALCIUM 40 MG: 40 TABLET, FILM COATED ORAL at 20:09

## 2023-05-17 RX ADMIN — SODIUM CHLORIDE, PRESERVATIVE FREE 10 ML: 5 INJECTION INTRAVENOUS at 10:05

## 2023-05-17 RX ADMIN — MECLIZINE 12.5 MG: 12.5 TABLET ORAL at 20:09

## 2023-05-17 RX ADMIN — SODIUM CHLORIDE, PRESERVATIVE FREE 10 ML: 5 INJECTION INTRAVENOUS at 20:09

## 2023-05-17 RX ADMIN — SODIUM CHLORIDE, PRESERVATIVE FREE 10 ML: 5 INJECTION INTRAVENOUS at 01:28

## 2023-05-17 RX ADMIN — ASPIRIN 81 MG 81 MG: 81 TABLET ORAL at 10:04

## 2023-05-17 RX ADMIN — ENOXAPARIN SODIUM 40 MG: 100 INJECTION SUBCUTANEOUS at 10:04

## 2023-05-17 ASSESSMENT — ENCOUNTER SYMPTOMS
BLOOD IN STOOL: 0
SORE THROAT: 0
TROUBLE SWALLOWING: 0
VOMITING: 0
NAUSEA: 0
DIARRHEA: 0
APNEA: 0
CHEST TIGHTNESS: 0
WHEEZING: 0
SHORTNESS OF BREATH: 1
EYE DISCHARGE: 0
ABDOMINAL PAIN: 0
COUGH: 0

## 2023-05-17 NOTE — CARE COORDINATION
05/17/23 1632   Service Assessment   Patient Orientation Alert and Oriented   Cognition Alert   History Provided By Patient   Primary Caregiver Self   Support Systems None   PCP Verified by CM Yes   Last Visit to PCP Within last year   Prior Functional Level Independent in ADLs/IADLs   Current Functional Level Independent in ADLs/IADLs   Can patient return to prior living arrangement Unknown at present   Ability to make needs known: Good   Family able to assist with home care needs: No   Would you like for me to discuss the discharge plan with any other family members/significant others, and if so, who? No   Financial Resources Medicare   Social/Functional History   Lives With Friend(s)   Type of [address] One level   Home Access Stairs to enter without rails   Entrance Stairs - Number of Steps 1   Active  Yes   Occupation Retired   Discharge Planning   Type of [address] Discharge   Confirm Follow Up Transport Friends     CM met with patient to completed DCP assessment. Patient lives with his friend at [address], a single story home accessible by 1 step. He ambulates with a prosthetic leg and cane, and completes ADLs without assistance. His friends give him rides to appointments. Home health services were declined at this time. Current discharge disposition is home self care.

## 2023-05-17 NOTE — CONSULTS
Labs     Lab Results   Component Value Date/Time    WBC 5.6 05/17/2023 04:54 AM    HGB 12.8 05/17/2023 04:54 AM    HCT 39.3 05/17/2023 04:54 AM     05/17/2023 04:54 AM    MCV 92.7 05/17/2023 04:54 AM     Lab Results   Component Value Date/Time     05/17/2023 04:54 AM    K 3.4 05/17/2023 04:54 AM     05/17/2023 04:54 AM    CO2 27 05/17/2023 04:54 AM    BUN 17 05/17/2023 04:54 AM      Last Lipid:  No results found for: CHOL, CHOLX, CHLST, CHOLV, HDL, HDLC, LDL, LDLC, TGLX, TRIGL  No results found for: CPK, RCK1, CKMB, BNP   No results found for: TSH, TSH2, TSH3, TSHELE, TSHEXT, T3RIA, T3UP, FT3, FT4, FT4P, T4, T4P, FT4T, TT7       Imaging & Acillary Studies     EKG:    Marked sinus bradycardia,HR 42  Possible Left atrial enlargement   Left axis deviation   Left ventricular hypertrophy   Inferior infarct , age undetermined   Abnormal ECG       CTA CHEST W WO CONTRAST   Final Result   There is no pulmonary embolism. There is no aortic aneurysm or dissection. Coronary artery disease and minimal cardiomegaly. Mild centrilobular emphysematous change. Incidental findings are as described above. Echo:   No valid procedures specified. Total time spent:  60 minutes      Dr. Hari Whyte.  Darlin Delgado MD.PhD. [facility]

## 2023-05-17 NOTE — PROGRESS NOTES
Dual skin assessment performed with second RN. 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 No open wounds or pressure injuries noted. Pt mentioned being homeless over the last 6 months and takes no medications at this time.

## 2023-05-17 NOTE — PROGRESS NOTES
HOSPITALIST PROGRESS NOTE  Jacklyn Burdick MD, [facility]  [address]         Daily Progress Note: 5/17/2023      Subjective:     Admission H&P - 5/17/2023  Ai Khan is a 57 y.o. male with PMH of CAD s/p PCI, HLP presented to the ED with chief complaint of dizziness. He reports have been having intermittent dizziness for the past few months. However, this morning, the dizziness was very severe. Associated with shortness of breath. hot flushes, diaphoresis and LOC. He reports loss consciousness on chair for a few hours. Did not hit head. He reports have been noticing bradycardia previously, however never had severe symptoms. Otherwise denies chest pain. In the ED, noted bradycardic, HR 40s. EKG showed sinus bradycardic, no heart block. Troponin negative. CTA chest showed mild emphysematous changes w/o acute findings or pulmonary embolism.    =======================    Patient is alert and oriented x4. Continues to complain of intermittent dizziness with possible vertigo however difficult to describe. No nausea vomiting, double vision or visual changes, headache, chest pain, shortness of breath noted. Echocardiogram pending. Patient tested at bedside via Krystle-Hallpike maneuver however maneuver was negative for BPPV. Will obtain orthostatic vital signs along with waiting for echocardiogram along with PT evaluation.       Medications reviewed  Current Facility-Administered Medications   Medication Dose Route Frequency    aspirin chewable tablet 81 mg  81 mg Oral Daily    atorvastatin (LIPITOR) tablet 40 mg  40 mg Oral Nightly    meclizine (ANTIVERT) tablet 12.5 mg  12.5 mg Oral TID PRN    sodium chloride flush 0.9 % injection 5-40 mL  5-40 mL IntraVENous 2 times per day    sodium chloride flush 0.9 % injection 5-40 mL  5-40 mL IntraVENous PRN    0.9 % sodium chloride infusion   IntraVENous PRN    enoxaparin (LOVENOX)

## 2023-05-17 NOTE — ED NOTES
TRANSFER - OUT REPORT:    Verbal report given to [address] on Shay Weiss  being transferred to MATY Westfall for routine progression of patient care       Report consisted of patient's Situation, Background, Assessment and   Recommendations(SBAR). Information from the following report(s) ED Encounter Summary, ED SBAR, Intake/Output, MAR, and Recent Results was reviewed with the receiving nurse. [place] Assessment: Presents to emergency department  because of falls (Syncope, seizure, or loss of consciousness): No, Age > 79: No, Altered Mental Status, Intoxication with alcohol or substance confusion (Disorientation, impaired judgment, poor safety awaremess, or inability to follow instructions): No, Impaired Mobility: Ambulates or transfers with assistive devices or assistance; Unable to ambulate or transer.: No, Nursing Judgement: No  Lines:   Peripheral IV 05/16/23 Left Forearm (Active)        Opportunity for questions and clarification was provided.       Patient transported with:  Monitor and Tech          Bipin Jj [facility]  05/16/23 0209

## 2023-05-17 NOTE — H&P
History and Physical    Patient: Wm Lemons MRN: [id number]  SSN: [id number]    YOB: 1960  Age: 57 y.o. Sex: male      Subjective:      Wm Lemons is a 57 y.o. male with PMH of CAD s/p PCI, HLP presented to the ED with chief complaint of dizziness. He reports have been having intermittent dizziness for the past few months. However, this morning, the dizziness was very severe. Associated with shortness of breath. hot flushes, diaphoresis and LOC. He reports loss consciousness on chair for a few hours. Did not hit head. He reports have been noticing bradycardia previously, however never had severe symptoms. Otherwise denies chest pain. In the ED, noted bradycardic, HR 40s. EKG showed sinus bradycardic, no heart block. Troponin negative. CTA chest showed mild emphysematous changes w/o acute findings or pulmonary embolism. Case discussed with ED physician and agree that patient require inpatient admission/ observation for further management. Chart review: none    History reviewed. No pertinent past medical history. History reviewed. No pertinent family history. Social History     Tobacco Use    Smoking status: Not on file    Smokeless tobacco: Not on file   Substance Use Topics    Alcohol use: Not on file        Objective:     Physical Exam:   General: alert, cooperative, no distress  Eye: conjunctivae/corneas clear. PERRL, EOM's intact. Throat and Neck: normal and no erythema or exudates noted. No mass   Lung: clear to auscultation bilaterally  Heart: regular rate and rhythm, bradycardic. 2/6 systolic murmur noted. Abdomen: soft, non-tender. Bowel sounds normal. No masses,  Extremities: No LE edema. able to move all extremities normal, atraumatic  Skin: Normal.  Neurologic: AOx3. Motor function and sensation grossly intact. Psychiatric: non focal    Most recent lab work and imaging results reviewed in EMR.      Assessment and plan:   # Symptomatic bradycardic   - Monitor with

## 2023-05-17 NOTE — PLAN OF CARE
Problem: Safety - Adult  Goal: Free from fall injury  5/17/2023 0041 by Daniele Corbin RN  Outcome: Progressing  5/17/2023 0040 by Daniele Corbin RN  Outcome: Progressing

## 2023-05-18 VITALS
OXYGEN SATURATION: 97 % | RESPIRATION RATE: 18 BRPM | HEIGHT: 71 IN | WEIGHT: 156.5 LBS | SYSTOLIC BLOOD PRESSURE: 101 MMHG | HEART RATE: 65 BPM | TEMPERATURE: 98 F | BODY MASS INDEX: 21.91 KG/M2 | DIASTOLIC BLOOD PRESSURE: 72 MMHG

## 2023-05-18 LAB
EKG ATRIAL RATE: 60 BPM
EKG DIAGNOSIS: NORMAL
EKG P AXIS: 75 DEGREES
EKG P-R INTERVAL: 142 MS
EKG Q-T INTERVAL: 458 MS
EKG QRS DURATION: 96 MS
EKG QTC CALCULATION (BAZETT): 458 MS
EKG R AXIS: -38 DEGREES
EKG T AXIS: 31 DEGREES
EKG VENTRICULAR RATE: 60 BPM

## 2023-05-18 PROCEDURE — 93005 ELECTROCARDIOGRAM TRACING: CPT | Performed by: INTERNAL MEDICINE

## 2023-05-18 PROCEDURE — 6370000000 HC RX 637 (ALT 250 FOR IP): Performed by: INTERNAL MEDICINE

## 2023-05-18 PROCEDURE — 6360000002 HC RX W HCPCS: Performed by: INTERNAL MEDICINE

## 2023-05-18 PROCEDURE — 2580000003 HC RX 258: Performed by: INTERNAL MEDICINE

## 2023-05-18 RX ORDER — ATORVASTATIN CALCIUM 40 MG/1
40 TABLET, FILM COATED ORAL NIGHTLY
Qty: 30 TABLET | Refills: 3 | Status: SHIPPED | OUTPATIENT
Start: 2023-05-18

## 2023-05-18 RX ORDER — ASPIRIN 81 MG/1
81 TABLET, CHEWABLE ORAL DAILY
Qty: 30 TABLET | Refills: 3 | Status: SHIPPED | OUTPATIENT
Start: 2023-05-19

## 2023-05-18 RX ADMIN — SODIUM CHLORIDE, PRESERVATIVE FREE 10 ML: 5 INJECTION INTRAVENOUS at 08:28

## 2023-05-18 RX ADMIN — ENOXAPARIN SODIUM 40 MG: 100 INJECTION SUBCUTANEOUS at 08:27

## 2023-05-18 RX ADMIN — ASPIRIN 81 MG 81 MG: 81 TABLET ORAL at 08:27

## 2023-05-18 NOTE — PROGRESS NOTES
Discharge plan of care/case management plan validated with provider's discharge order. I have reviewed the discharge instructions with the patient and answered questions accordingly. IV and telemetry removed. Patient will transport to friend's home via cab.

## 2023-05-18 NOTE — CARE COORDINATION
Transition of Care Plan:    RUR: 7%  Prior Level of Functioning: independent  Disposition: home self care  If SNF or IPR: Date FOC offered: N/A  Date FOC received: N/A  Accepting facility: N/A  Date authorization started with reference number: N/A  Date authorization received and expires: N/A  Follow up appointments:   DME needed: None  Transportation at discharge: friend  IM/IMM Medicare/ letter given: Yes  Is patient a [place] and connected with [place]? If yes, was [employer] transfer form completed and VA notified? Caregiver Contact:   Discharge Caregiver contacted prior to discharge? Care Conference needed? Barriers to discharge:     Patient is discharging back to his friend's home. He was given resource sheets on homelessness. Patient is clear to discharge home self by CM. Nurse is aware.

## 2023-05-18 NOTE — DISCHARGE SUMMARY
HOSPITALIST DISCHARGE NOTE  Debbie Villeda MD, [facility]       PATIENT ID: Indra Ambrose  MRN: [id number]   YOB: 1960    DATE OF ADMISSION: 5/16/2023  7:17 PM    DATE OF DISCHARGE: 05/18/23    PRIMARY CARE PROVIDER: Lawrence Mcardle, MD     ATTENDING PHYSICIAN: Senthil Herrera MD  DISCHARGING PROVIDER: Senthil Herrera MD        CONSULTATIONS: IP CONSULT TO CARDIOLOGY  IP CONSULT TO SOCIAL WORK    PROCEDURES/SURGERIES: * No surgery found *    [address] COURSE:     Admission H&P - 5/17/2023  Indra Ambrose is a 57 y.o. male with PMH of CAD s/p PCI, HLP presented to the ED with chief complaint of dizziness. He reports have been having intermittent dizziness for the past few months. However, this morning, the dizziness was very severe. Associated with shortness of breath. hot flushes, diaphoresis and LOC. He reports loss consciousness on chair for a few hours. Did not hit head. He reports have been noticing bradycardia previously, however never had severe symptoms. Otherwise denies chest pain. In the ED, noted bradycardic, HR 40s. EKG showed sinus bradycardic, no heart block. Troponin negative. CTA chest showed mild emphysematous changes w/o acute findings or pulmonary embolism.     =======================     Patient is alert and oriented x4. Improved dizziness with possible vertigo however difficult to describe. No nausea vomiting, double vision or visual changes, headache, chest pain, shortness of breath noted. Negative at bedside for  Krystle-Hallpike maneuver, for BPPV. Normal orthostatics along with normal echocardiogram and preserved left ventricular systolic function without any valvular pathology noted. DISCHARGE DIAGNOSES / PLAN:      Severe dizziness  Improved without significant intervention.   ECHO normal with preserved left ventricular systolic function and without

## 2024-05-08 ENCOUNTER — HOSPITAL ENCOUNTER (INPATIENT)
Facility: HOSPITAL | Age: 64
LOS: 5 days | Discharge: HOME | DRG: 322 | End: 2024-05-13
Attending: EMERGENCY MEDICINE | Admitting: FAMILY MEDICINE
Payer: MEDICARE

## 2024-05-08 ENCOUNTER — APPOINTMENT (OUTPATIENT)
Facility: HOSPITAL | Age: 64
DRG: 322 | End: 2024-05-08
Payer: MEDICARE

## 2024-05-08 DIAGNOSIS — R00.1 SYMPTOMATIC BRADYCARDIA: Primary | ICD-10-CM

## 2024-05-08 DIAGNOSIS — R01.1 HEART MURMUR: ICD-10-CM

## 2024-05-08 DIAGNOSIS — R07.9 CHEST PAIN: ICD-10-CM

## 2024-05-08 PROBLEM — I49.9 IRREGULAR HEART RHYTHM: Status: ACTIVE | Noted: 2024-05-08

## 2024-05-08 LAB
ALBUMIN SERPL-MCNC: 2.8 G/DL (ref 3.5–5)
ALBUMIN/GLOB SERPL: 0.6 (ref 1.1–2.2)
ALP SERPL-CCNC: 106 U/L (ref 45–117)
ALT SERPL-CCNC: 29 U/L (ref 12–78)
ANION GAP SERPL CALC-SCNC: 4 MMOL/L (ref 5–15)
AST SERPL-CCNC: 18 U/L (ref 15–37)
BASOPHILS # BLD: 0 K/UL (ref 0–0.1)
BASOPHILS NFR BLD: 1 % (ref 0–1)
BILIRUB SERPL-MCNC: 0.6 MG/DL (ref 0.2–1)
BUN SERPL-MCNC: 18 MG/DL (ref 6–20)
BUN/CREAT SERPL: 17 (ref 12–20)
CALCIUM SERPL-MCNC: 9.4 MG/DL (ref 8.5–10.1)
CHLORIDE SERPL-SCNC: 103 MMOL/L (ref 97–108)
CO2 SERPL-SCNC: 28 MMOL/L (ref 21–32)
CREAT SERPL-MCNC: 1.07 MG/DL (ref 0.7–1.3)
DIFFERENTIAL METHOD BLD: ABNORMAL
EOSINOPHIL # BLD: 0.2 K/UL (ref 0–0.4)
EOSINOPHIL NFR BLD: 3 % (ref 0–7)
ERYTHROCYTE [DISTWIDTH] IN BLOOD BY AUTOMATED COUNT: 12.4 % (ref 11.5–14.5)
GLOBULIN SER CALC-MCNC: 4.9 G/DL (ref 2–4)
GLUCOSE SERPL-MCNC: 120 MG/DL (ref 65–100)
HCT VFR BLD AUTO: 33.2 % (ref 36.6–50.3)
HGB BLD-MCNC: 11.1 G/DL (ref 12.1–17)
IMM GRANULOCYTES # BLD AUTO: 0 K/UL (ref 0–0.04)
IMM GRANULOCYTES NFR BLD AUTO: 0 % (ref 0–0.5)
LYMPHOCYTES # BLD: 1 K/UL (ref 0.8–3.5)
LYMPHOCYTES NFR BLD: 17 % (ref 12–49)
MAGNESIUM SERPL-MCNC: 2.3 MG/DL (ref 1.6–2.4)
MCH RBC QN AUTO: 28.8 PG (ref 26–34)
MCHC RBC AUTO-ENTMCNC: 33.4 G/DL (ref 30–36.5)
MCV RBC AUTO: 86 FL (ref 80–99)
MONOCYTES # BLD: 0.8 K/UL (ref 0–1)
MONOCYTES NFR BLD: 13 % (ref 5–13)
NEUTS SEG # BLD: 3.9 K/UL (ref 1.8–8)
NEUTS SEG NFR BLD: 66 % (ref 32–75)
NRBC # BLD: 0 K/UL (ref 0–0.01)
NRBC BLD-RTO: 0 PER 100 WBC
PLATELET # BLD AUTO: 392 K/UL (ref 150–400)
PMV BLD AUTO: 9.1 FL (ref 8.9–12.9)
POTASSIUM SERPL-SCNC: 3.4 MMOL/L (ref 3.5–5.1)
PROT SERPL-MCNC: 7.7 G/DL (ref 6.4–8.2)
RBC # BLD AUTO: 3.86 M/UL (ref 4.1–5.7)
SODIUM SERPL-SCNC: 135 MMOL/L (ref 136–145)
TROPONIN I SERPL HS-MCNC: 61 NG/L (ref 0–76)
TROPONIN I SERPL HS-MCNC: 71 NG/L (ref 0–76)
TSH SERPL DL<=0.05 MIU/L-ACNC: <0.01 UIU/ML (ref 0.36–3.74)
WBC # BLD AUTO: 5.9 K/UL (ref 4.1–11.1)

## 2024-05-08 PROCEDURE — 85025 COMPLETE CBC W/AUTO DIFF WBC: CPT

## 2024-05-08 PROCEDURE — 71046 X-RAY EXAM CHEST 2 VIEWS: CPT

## 2024-05-08 PROCEDURE — 2580000003 HC RX 258: Performed by: FAMILY MEDICINE

## 2024-05-08 PROCEDURE — 93005 ELECTROCARDIOGRAM TRACING: CPT

## 2024-05-08 PROCEDURE — 80053 COMPREHEN METABOLIC PANEL: CPT

## 2024-05-08 PROCEDURE — 84443 ASSAY THYROID STIM HORMONE: CPT

## 2024-05-08 PROCEDURE — 2060000000 HC ICU INTERMEDIATE R&B

## 2024-05-08 PROCEDURE — 99285 EMERGENCY DEPT VISIT HI MDM: CPT

## 2024-05-08 PROCEDURE — 36415 COLL VENOUS BLD VENIPUNCTURE: CPT

## 2024-05-08 PROCEDURE — 93005 ELECTROCARDIOGRAM TRACING: CPT | Performed by: INTERNAL MEDICINE

## 2024-05-08 PROCEDURE — 83735 ASSAY OF MAGNESIUM: CPT

## 2024-05-08 PROCEDURE — 84484 ASSAY OF TROPONIN QUANT: CPT

## 2024-05-08 PROCEDURE — 99223 1ST HOSP IP/OBS HIGH 75: CPT | Performed by: STUDENT IN AN ORGANIZED HEALTH CARE EDUCATION/TRAINING PROGRAM

## 2024-05-08 PROCEDURE — 6370000000 HC RX 637 (ALT 250 FOR IP): Performed by: FAMILY MEDICINE

## 2024-05-08 PROCEDURE — 6360000002 HC RX W HCPCS: Performed by: FAMILY MEDICINE

## 2024-05-08 RX ORDER — DULOXETIN HYDROCHLORIDE 30 MG/1
30 CAPSULE, DELAYED RELEASE ORAL DAILY
Status: DISCONTINUED | OUTPATIENT
Start: 2024-05-08 | End: 2024-05-08

## 2024-05-08 RX ORDER — GABAPENTIN 300 MG/1
300 CAPSULE ORAL DAILY
Status: DISCONTINUED | OUTPATIENT
Start: 2024-05-09 | End: 2024-05-13 | Stop reason: HOSPADM

## 2024-05-08 RX ORDER — CLOPIDOGREL BISULFATE 75 MG/1
75 TABLET ORAL DAILY
Status: DISCONTINUED | OUTPATIENT
Start: 2024-05-09 | End: 2024-05-13 | Stop reason: HOSPADM

## 2024-05-08 RX ORDER — SODIUM CHLORIDE 9 MG/ML
INJECTION, SOLUTION INTRAVENOUS PRN
Status: DISCONTINUED | OUTPATIENT
Start: 2024-05-08 | End: 2024-05-13 | Stop reason: HOSPADM

## 2024-05-08 RX ORDER — ACETAMINOPHEN 650 MG/1
650 SUPPOSITORY RECTAL EVERY 6 HOURS PRN
Status: DISCONTINUED | OUTPATIENT
Start: 2024-05-08 | End: 2024-05-13 | Stop reason: HOSPADM

## 2024-05-08 RX ORDER — GABAPENTIN 300 MG/1
300 CAPSULE ORAL 3 TIMES DAILY
Status: ON HOLD | COMMUNITY
End: 2024-05-17

## 2024-05-08 RX ORDER — POTASSIUM CHLORIDE 7.45 MG/ML
10 INJECTION INTRAVENOUS PRN
Status: DISCONTINUED | OUTPATIENT
Start: 2024-05-08 | End: 2024-05-13 | Stop reason: HOSPADM

## 2024-05-08 RX ORDER — CLOPIDOGREL BISULFATE 75 MG/1
75 TABLET ORAL DAILY
Status: ON HOLD | COMMUNITY
End: 2024-05-17

## 2024-05-08 RX ORDER — SODIUM CHLORIDE 0.9 % (FLUSH) 0.9 %
5-40 SYRINGE (ML) INJECTION PRN
Status: DISCONTINUED | OUTPATIENT
Start: 2024-05-08 | End: 2024-05-13 | Stop reason: HOSPADM

## 2024-05-08 RX ORDER — QUETIAPINE FUMARATE 100 MG/1
100 TABLET, FILM COATED ORAL 2 TIMES DAILY
COMMUNITY

## 2024-05-08 RX ORDER — ONDANSETRON 2 MG/ML
4 INJECTION INTRAMUSCULAR; INTRAVENOUS EVERY 6 HOURS PRN
Status: DISCONTINUED | OUTPATIENT
Start: 2024-05-08 | End: 2024-05-13 | Stop reason: HOSPADM

## 2024-05-08 RX ORDER — ONDANSETRON 4 MG/1
4 TABLET, ORALLY DISINTEGRATING ORAL EVERY 8 HOURS PRN
Status: DISCONTINUED | OUTPATIENT
Start: 2024-05-08 | End: 2024-05-13 | Stop reason: HOSPADM

## 2024-05-08 RX ORDER — POTASSIUM CHLORIDE 750 MG/1
40 TABLET, FILM COATED, EXTENDED RELEASE ORAL PRN
Status: DISCONTINUED | OUTPATIENT
Start: 2024-05-08 | End: 2024-05-13 | Stop reason: HOSPADM

## 2024-05-08 RX ORDER — SODIUM CHLORIDE 9 MG/ML
INJECTION, SOLUTION INTRAVENOUS CONTINUOUS
Status: DISCONTINUED | OUTPATIENT
Start: 2024-05-08 | End: 2024-05-09

## 2024-05-08 RX ORDER — ATORVASTATIN CALCIUM 20 MG/1
40 TABLET, FILM COATED ORAL NIGHTLY
Status: DISCONTINUED | OUTPATIENT
Start: 2024-05-08 | End: 2024-05-13 | Stop reason: HOSPADM

## 2024-05-08 RX ORDER — POLYETHYLENE GLYCOL 3350 17 G/17G
17 POWDER, FOR SOLUTION ORAL DAILY PRN
Status: DISCONTINUED | OUTPATIENT
Start: 2024-05-08 | End: 2024-05-13 | Stop reason: HOSPADM

## 2024-05-08 RX ORDER — ENOXAPARIN SODIUM 100 MG/ML
40 INJECTION SUBCUTANEOUS DAILY
Status: DISCONTINUED | OUTPATIENT
Start: 2024-05-08 | End: 2024-05-13 | Stop reason: HOSPADM

## 2024-05-08 RX ORDER — MAGNESIUM SULFATE IN WATER 40 MG/ML
2000 INJECTION, SOLUTION INTRAVENOUS PRN
Status: DISCONTINUED | OUTPATIENT
Start: 2024-05-08 | End: 2024-05-13 | Stop reason: HOSPADM

## 2024-05-08 RX ORDER — ASPIRIN 81 MG/1
81 TABLET, CHEWABLE ORAL DAILY
Status: DISCONTINUED | OUTPATIENT
Start: 2024-05-09 | End: 2024-05-13 | Stop reason: HOSPADM

## 2024-05-08 RX ORDER — DULOXETIN HYDROCHLORIDE 30 MG/1
30 CAPSULE, DELAYED RELEASE ORAL DAILY
Status: ON HOLD | COMMUNITY
End: 2024-05-17 | Stop reason: HOSPADM

## 2024-05-08 RX ORDER — ACETAMINOPHEN 325 MG/1
650 TABLET ORAL EVERY 6 HOURS PRN
Status: DISCONTINUED | OUTPATIENT
Start: 2024-05-08 | End: 2024-05-13 | Stop reason: HOSPADM

## 2024-05-08 RX ORDER — SODIUM CHLORIDE 0.9 % (FLUSH) 0.9 %
5-40 SYRINGE (ML) INJECTION EVERY 12 HOURS SCHEDULED
Status: DISCONTINUED | OUTPATIENT
Start: 2024-05-08 | End: 2024-05-13 | Stop reason: HOSPADM

## 2024-05-08 RX ORDER — QUETIAPINE FUMARATE 100 MG/1
100 TABLET, FILM COATED ORAL NIGHTLY
Status: DISCONTINUED | OUTPATIENT
Start: 2024-05-08 | End: 2024-05-13 | Stop reason: HOSPADM

## 2024-05-08 RX ADMIN — SODIUM CHLORIDE: 9 INJECTION, SOLUTION INTRAVENOUS at 18:22

## 2024-05-08 RX ADMIN — QUETIAPINE FUMARATE 100 MG: 100 TABLET ORAL at 22:29

## 2024-05-08 RX ADMIN — SODIUM CHLORIDE, PRESERVATIVE FREE 10 ML: 5 INJECTION INTRAVENOUS at 22:30

## 2024-05-08 RX ADMIN — ATORVASTATIN CALCIUM 40 MG: 20 TABLET, FILM COATED ORAL at 22:29

## 2024-05-08 RX ADMIN — ENOXAPARIN SODIUM 40 MG: 100 INJECTION SUBCUTANEOUS at 18:24

## 2024-05-08 ASSESSMENT — PAIN DESCRIPTION - LOCATION: LOCATION: NECK

## 2024-05-08 ASSESSMENT — PAIN - FUNCTIONAL ASSESSMENT: PAIN_FUNCTIONAL_ASSESSMENT: 0-10

## 2024-05-08 ASSESSMENT — PAIN DESCRIPTION - ORIENTATION: ORIENTATION: LEFT;RIGHT

## 2024-05-08 NOTE — ED NOTES
TRANSFER - OUT REPORT:    Verbal report given to MATY Mccallum on Dannie James  being transferred to [facility] for routine progression of patient care       Report consisted of patient's Situation, Background, Assessment and   Recommendations(SBAR).     Information from the following report(s) Nurse Handoff Report was reviewed with the receiving nurse.    Kinder Fall Assessment:                           Lines:   Peripheral IV 05/08/24 Left Antecubital (Active)   Site Assessment Clean, dry & intact 05/08/24 1144   Line Status Blood return noted 05/08/24 1144   Phlebitis Assessment No symptoms 05/08/24 1144   Infiltration Assessment 0 05/08/24 1144   Alcohol Cap Used No 05/08/24 1144   Dressing Status New dressing applied 05/08/24 1144   Dressing Type Transparent 05/08/24 1144   Dressing Intervention New 05/08/24 1144        Opportunity for questions and clarification was provided.      Patient transported with:  Tech

## 2024-05-08 NOTE — ED TRIAGE NOTES
Pateint arrives via EMS from cardiology office, patient was noted to be in an abnormal heart rhythm. Patient intermittently goes from NSR to sinus francheska with PVC's and to A.Fib. Patient states he feels fatigued, has pain in his neck on both sides, nausea since yesterday and intermittently dizzy and short of breath.     History of two MI's and blood clots, takes a blood thinner.

## 2024-05-08 NOTE — CARE COORDINATION
5/8/2024  4:59 PM     05/08/24 5259   Service Assessment   Patient Orientation Alert and Oriented   Cognition Alert   History Provided By Patient   Primary Caregiver Self   Support Systems None   Patient's Healthcare Decision Maker is: Legal Next of Kin  (sister Miya James (ISAIAS) [phone])   PCP Verified by CM Yes  (Lashell Shirley MD)   Last Visit to PCP Within last 3 months   Prior Functional Level Independent in ADLs/IADLs;Assistance with the following:;Mobility  (pt is R AKA, has prosthetic leg, uses Crutches for mobility currently)   Current Functional Level Independent in ADLs/IADLs;Mobility  (pt is R AKA, has prosthetic leg, currently unable to use prosthesis, using crutches)   Can patient return to prior living arrangement Yes   Ability to make needs known: Good   Family able to assist with home care needs: No   Financial Resources Medicare  ([employer])   Community Resources Other (Comment)  (pt is in [place] for ETOH/Drug Addiction)   Social/Functional History   Lives With Other (comment)  (pt resides in [place] house)   Type of Home House   Home Layout Multi-level;Able to Live on Main level with bedroom/bathroom   Home Access Stairs to enter with rails   Entrance Stairs - Number of Steps 3   Entrance Stairs - Rails Both   Bathroom Equipment None   Home Equipment Cane;Crutches  (pt has R Prosthetic leg (AKA))   ADL Assistance Independent   Ambulation Assistance Needs assistance  (Crutches or Cane, has R Prosthetic Leg for AKA)   Transfer Assistance Independent   Active  No   Patient's  Info [employer] Medicare   Mode of Transportation Car   Occupation Retired   Discharge Planning   Type of Residence Other (Comment)  ([place])   Current Services Prior To Admission None   Type of Home Care Services None   Patient expects to be discharged to: Other (comment)  ([place] for ETOH and Drug)   History of falls? 0   Services At/After Discharge   Mode of Transport at Discharge Other (see

## 2024-05-08 NOTE — H&P
[facility]  [address] [place], [place] [id number] [phone]    [facility] Adult  Hospitalist Group    History & Physical    Date of service: 5/8/2024    Patient name: Dannie James  MRN: [id number]  YOB: 1960  Age: 63 y.o.     Primary care provider:  Lashell Shirley MD     Source of Information: patient, medical records                              Chief complain: Abnormal heart rhythm    History of present illness  Dannie James is a 63 y.o. male who  was sent in from his cardiology office for abnormal heart rhythm.  He was noted to go from sinus rhythm to sinus bradycardia with PVCs and A-fib.  He reports feeling fatigued and nauseated and intermittently dizzy.  In the ED his heart rate dropped as low as 40.    Past Medical History:   Diagnosis Date    Hypercholesterolemia     MI (myocardial infarction) (HCC)     Spinal stenosis       Past Surgical History:   Procedure Laterality Date    ABOVE KNEE AMPUTATION      FEMORAL BYPASS      JOINT REPLACEMENT       Prior to Admission medications    Medication Sig Start Date End Date Taking? Authorizing Provider   DULoxetine (CYMBALTA) 30 MG extended release capsule Take 1 capsule by mouth daily   Yes Usman Smith MD   gabapentin (NEURONTIN) 300 MG capsule Take 1 capsule by mouth 3 times daily. Max Daily Amount: 900 mg   Yes Usman Smith MD   clopidogrel (PLAVIX) 75 MG tablet Take 1 tablet by mouth daily   Yes Usman Smith MD   QUEtiapine (SEROQUEL) 100 MG tablet Take 1 tablet by mouth 2 times daily   Yes Usman Smith MD   aspirin 81 MG chewable tablet Take 1 tablet by mouth daily 5/19/23   Hugo Rangel MD   atorvastatin (LIPITOR) 40 MG tablet Take 1 tablet by mouth nightly 5/18/23   Hugo Rangel MD     No Known Allergies   History reviewed. No pertinent family history.      Social history    Social History     Tobacco Use   Smoking Status Former    Types:

## 2024-05-08 NOTE — ED PROVIDER NOTES
[facility] EMERGENCY DEPT  EMERGENCY DEPARTMENT ENCOUNTER      Patient Name: Dannie James  MRN: [id number]  Birthdate 1960  Date of Evaluation: 5/8/2024  Physician: Pedro Read MD    CHIEF COMPLAINT       Chief Complaint   Patient presents with    Nausea    Dizziness       HISTORY OF PRESENT ILLNESS   (Location/Symptom, Timing/Onset, Context/Setting, Quality, Duration, Modifying Factors, Severity)   Dannie James, 63 y.o., male     63-year-old male presents with a chief complaint of shortness of breath and fatigue.  Patient was seen by cardiology today and referred to the emergency department.  He endorses nausea and vomiting yesterday.  He denies abdominal pain, chest pain.          Nursing Notes were reviewed.    REVIEW OF SYSTEMS    (Not required)   Review of Systems    Except as noted above the remainder of the review of systems was reviewed and negative.     PAST MEDICAL HISTORY   No past medical history on file.    SURGICAL HISTORY     No past surgical history on file.    CURRENT MEDICATIONS       Previous Medications    ASPIRIN 81 MG CHEWABLE TABLET    Take 1 tablet by mouth daily    ATORVASTATIN (LIPITOR) 40 MG TABLET    Take 1 tablet by mouth nightly       ALLERGIES     Patient has no known allergies.    FAMILY HISTORY     No family history on file.     SOCIAL HISTORY       Social History     Socioeconomic History    Marital status:    Tobacco Use    Smoking status: Former     Types: Cigarettes    Smokeless tobacco: Never   Vaping Use    Vaping Use: Former   Substance and Sexual Activity    Alcohol use: Not Currently    Drug use: Never       PHYSICAL EXAM     Vitals:    Vitals:    05/08/24 1142 05/08/24 1225   BP: (!) 136/53 (!) 113/56   Pulse: 77 57   Resp: 18 24   Temp: 98.5 °F (36.9 °C)    TempSrc: Oral    SpO2: 100% 96%   Weight: 74.8 kg (165 lb)    Height: 1.803 m (5' 11\")        Physical Exam  Vitals and nursing note reviewed.   Constitutional:       General: He is not in acute distress.

## 2024-05-08 NOTE — CARE COORDINATION
5/8/2024  4:27 PM  CM attempted to meet w/ pt., provider at bedside.  Will attempt again.  MARY Eduardo

## 2024-05-09 ENCOUNTER — APPOINTMENT (OUTPATIENT)
Facility: HOSPITAL | Age: 64
DRG: 322 | End: 2024-05-09
Attending: FAMILY MEDICINE
Payer: MEDICARE

## 2024-05-09 LAB
AMPHET UR QL SCN: NEGATIVE
ANION GAP SERPL CALC-SCNC: 6 MMOL/L (ref 5–15)
APPEARANCE UR: CLEAR
B PERT DNA SPEC QL NAA+PROBE: NOT DETECTED
BACTERIA URNS QL MICRO: NEGATIVE /HPF
BARBITURATES UR QL SCN: NEGATIVE
BASOPHILS # BLD: 0 K/UL (ref 0–0.1)
BASOPHILS NFR BLD: 1 % (ref 0–1)
BENZODIAZ UR QL: NEGATIVE
BILIRUB UR QL: NEGATIVE
BORDETELLA PARAPERTUSSIS BY PCR: NOT DETECTED
BUN SERPL-MCNC: 15 MG/DL (ref 6–20)
BUN/CREAT SERPL: 18 (ref 12–20)
C PNEUM DNA SPEC QL NAA+PROBE: NOT DETECTED
CALCIUM SERPL-MCNC: 8.3 MG/DL (ref 8.5–10.1)
CANNABINOIDS UR QL SCN: NEGATIVE
CHLORIDE SERPL-SCNC: 106 MMOL/L (ref 97–108)
CHOLEST SERPL-MCNC: 110 MG/DL
CO2 SERPL-SCNC: 23 MMOL/L (ref 21–32)
COCAINE UR QL SCN: NEGATIVE
COLOR UR: ABNORMAL
CREAT SERPL-MCNC: 0.82 MG/DL (ref 0.7–1.3)
DIFFERENTIAL METHOD BLD: ABNORMAL
ECHO AO ASC DIAM: 3.1 CM
ECHO AO ASCENDING AORTA INDEX: 1.6 CM/M2
ECHO AO ROOT DIAM: 4.2 CM
ECHO AO ROOT INDEX: 2.16 CM/M2
ECHO AV AREA PEAK VELOCITY: 1.3 CM2
ECHO AV AREA VTI: 1.4 CM2
ECHO AV AREA/BSA PEAK VELOCITY: 0.7 CM2/M2
ECHO AV AREA/BSA VTI: 0.7 CM2/M2
ECHO AV MEAN GRADIENT: 16 MMHG
ECHO AV MEAN VELOCITY: 1.9 M/S
ECHO AV PEAK GRADIENT: 30 MMHG
ECHO AV PEAK VELOCITY: 2.7 M/S
ECHO AV VELOCITY RATIO: 0.41
ECHO AV VTI: 51.8 CM
ECHO BSA: 1.94 M2
ECHO LA DIAMETER INDEX: 1.91 CM/M2
ECHO LA DIAMETER: 3.7 CM
ECHO LA TO AORTIC ROOT RATIO: 0.88
ECHO LA VOL A-L A2C: 45 ML (ref 18–58)
ECHO LA VOL A-L A4C: 42 ML (ref 18–58)
ECHO LA VOL BP: 42 ML (ref 18–58)
ECHO LA VOL MOD A2C: 44 ML (ref 18–58)
ECHO LA VOL MOD A4C: 38 ML (ref 18–58)
ECHO LA VOL/BSA BIPLANE: 22 ML/M2 (ref 16–34)
ECHO LA VOLUME AREA LENGTH: 45 ML
ECHO LA VOLUME INDEX A-L A2C: 23 ML/M2 (ref 16–34)
ECHO LA VOLUME INDEX A-L A4C: 22 ML/M2 (ref 16–34)
ECHO LA VOLUME INDEX AREA LENGTH: 23 ML/M2 (ref 16–34)
ECHO LA VOLUME INDEX MOD A2C: 23 ML/M2 (ref 16–34)
ECHO LA VOLUME INDEX MOD A4C: 20 ML/M2 (ref 16–34)
ECHO LV E' LATERAL VELOCITY: 12 CM/S
ECHO LV E' SEPTAL VELOCITY: 9 CM/S
ECHO LV EDV A2C: 96 ML
ECHO LV EDV A4C: 109 ML
ECHO LV EDV BP: 107 ML (ref 67–155)
ECHO LV EDV INDEX A4C: 56 ML/M2
ECHO LV EDV INDEX BP: 55 ML/M2
ECHO LV EDV NDEX A2C: 49 ML/M2
ECHO LV EJECTION FRACTION A2C: 62 %
ECHO LV EJECTION FRACTION A4C: 55 %
ECHO LV EJECTION FRACTION BIPLANE: 60 % (ref 55–100)
ECHO LV ESV A2C: 36 ML
ECHO LV ESV A4C: 49 ML
ECHO LV ESV BP: 42 ML (ref 22–58)
ECHO LV ESV INDEX A2C: 19 ML/M2
ECHO LV ESV INDEX A4C: 25 ML/M2
ECHO LV ESV INDEX BP: 22 ML/M2
ECHO LV FRACTIONAL SHORTENING: 32 % (ref 28–44)
ECHO LV INTERNAL DIMENSION DIASTOLE INDEX: 2.89 CM/M2
ECHO LV INTERNAL DIMENSION DIASTOLIC: 5.6 CM (ref 4.2–5.9)
ECHO LV INTERNAL DIMENSION SYSTOLIC INDEX: 1.96 CM/M2
ECHO LV INTERNAL DIMENSION SYSTOLIC: 3.8 CM
ECHO LV IVSD: 1.1 CM (ref 0.6–1)
ECHO LV MASS 2D: 264.7 G (ref 88–224)
ECHO LV MASS INDEX 2D: 136.4 G/M2 (ref 49–115)
ECHO LV POSTERIOR WALL DIASTOLIC: 1.2 CM (ref 0.6–1)
ECHO LV RELATIVE WALL THICKNESS RATIO: 0.43
ECHO LVOT AREA: 3.5 CM2
ECHO LVOT AV VTI INDEX: 0.41
ECHO LVOT DIAM: 2.1 CM
ECHO LVOT MEAN GRADIENT: 3 MMHG
ECHO LVOT PEAK GRADIENT: 4 MMHG
ECHO LVOT PEAK VELOCITY: 1.1 M/S
ECHO LVOT STROKE VOLUME INDEX: 38.2 ML/M2
ECHO LVOT SV: 74.1 ML
ECHO LVOT VTI: 21.4 CM
ECHO MV A VELOCITY: 1.14 M/S
ECHO MV E DECELERATION TIME (DT): 258.4 MS
ECHO MV E VELOCITY: 0.88 M/S
ECHO MV E/A RATIO: 0.77
ECHO MV E/E' LATERAL: 7.33
ECHO MV E/E' RATIO (AVERAGED): 8.56
ECHO MV REGURGITANT PEAK GRADIENT: 85 MMHG
ECHO MV REGURGITANT PEAK VELOCITY: 4.6 M/S
ECHO PV MAX VELOCITY: 1.4 M/S
ECHO PV PEAK GRADIENT: 8 MMHG
ECHO RV INTERNAL DIMENSION: 4.1 CM
ECHO RV TAPSE: 1.8 CM (ref 1.7–?)
ECHO RVOT PEAK GRADIENT: 2 MMHG
ECHO RVOT PEAK VELOCITY: 0.8 M/S
EKG ATRIAL RATE: 64 BPM
EKG ATRIAL RATE: 84 BPM
EKG DIAGNOSIS: NORMAL
EKG DIAGNOSIS: NORMAL
EKG P AXIS: 64 DEGREES
EKG P-R INTERVAL: 136 MS
EKG P-R INTERVAL: 158 MS
EKG Q-T INTERVAL: 400 MS
EKG Q-T INTERVAL: 414 MS
EKG QRS DURATION: 84 MS
EKG QRS DURATION: 94 MS
EKG QTC CALCULATION (BAZETT): 427 MS
EKG QTC CALCULATION (BAZETT): 472 MS
EKG R AXIS: -35 DEGREES
EKG R AXIS: -8 DEGREES
EKG T AXIS: 206 DEGREES
EKG T AXIS: 6 DEGREES
EKG VENTRICULAR RATE: 64 BPM
EKG VENTRICULAR RATE: 84 BPM
EOSINOPHIL # BLD: 0.2 K/UL (ref 0–0.4)
EOSINOPHIL NFR BLD: 3 % (ref 0–7)
EPITH CASTS URNS QL MICRO: ABNORMAL /LPF
ERYTHROCYTE [DISTWIDTH] IN BLOOD BY AUTOMATED COUNT: 12.6 % (ref 11.5–14.5)
EST. AVERAGE GLUCOSE BLD GHB EST-MCNC: 114 MG/DL
FLUAV SUBTYP SPEC NAA+PROBE: NOT DETECTED
FLUBV RNA SPEC QL NAA+PROBE: NOT DETECTED
GLUCOSE SERPL-MCNC: 115 MG/DL (ref 65–100)
GLUCOSE UR STRIP.AUTO-MCNC: NEGATIVE MG/DL
HADV DNA SPEC QL NAA+PROBE: NOT DETECTED
HBA1C MFR BLD: 5.6 % (ref 4–5.6)
HCOV 229E RNA SPEC QL NAA+PROBE: NOT DETECTED
HCOV HKU1 RNA SPEC QL NAA+PROBE: NOT DETECTED
HCOV NL63 RNA SPEC QL NAA+PROBE: NOT DETECTED
HCOV OC43 RNA SPEC QL NAA+PROBE: NOT DETECTED
HCT VFR BLD AUTO: 29.3 % (ref 36.6–50.3)
HDLC SERPL-MCNC: 27 MG/DL
HDLC SERPL: 4.1 (ref 0–5)
HGB BLD-MCNC: 9.9 G/DL (ref 12.1–17)
HGB UR QL STRIP: NEGATIVE
HMPV RNA SPEC QL NAA+PROBE: NOT DETECTED
HPIV1 RNA SPEC QL NAA+PROBE: NOT DETECTED
HPIV2 RNA SPEC QL NAA+PROBE: NOT DETECTED
HPIV3 RNA SPEC QL NAA+PROBE: NOT DETECTED
HPIV4 RNA SPEC QL NAA+PROBE: NOT DETECTED
HYALINE CASTS URNS QL MICRO: ABNORMAL /LPF (ref 0–2)
IMM GRANULOCYTES # BLD AUTO: 0 K/UL (ref 0–0.04)
IMM GRANULOCYTES NFR BLD AUTO: 1 % (ref 0–0.5)
KETONES UR QL STRIP.AUTO: ABNORMAL MG/DL
LDLC SERPL CALC-MCNC: 68.2 MG/DL (ref 0–100)
LEUKOCYTE ESTERASE UR QL STRIP.AUTO: NEGATIVE
LYMPHOCYTES # BLD: 1 K/UL (ref 0.8–3.5)
LYMPHOCYTES NFR BLD: 15 % (ref 12–49)
Lab: NORMAL
M PNEUMO DNA SPEC QL NAA+PROBE: NOT DETECTED
MAGNESIUM SERPL-MCNC: 2.1 MG/DL (ref 1.6–2.4)
MCH RBC QN AUTO: 29 PG (ref 26–34)
MCHC RBC AUTO-ENTMCNC: 33.8 G/DL (ref 30–36.5)
MCV RBC AUTO: 85.9 FL (ref 80–99)
METHADONE UR QL: NEGATIVE
MONOCYTES # BLD: 0.9 K/UL (ref 0–1)
MONOCYTES NFR BLD: 14 % (ref 5–13)
NEUTS SEG # BLD: 4.5 K/UL (ref 1.8–8)
NEUTS SEG NFR BLD: 66 % (ref 32–75)
NITRITE UR QL STRIP.AUTO: NEGATIVE
NRBC # BLD: 0 K/UL (ref 0–0.01)
NRBC BLD-RTO: 0 PER 100 WBC
OPIATES UR QL: NEGATIVE
PCP UR QL: NEGATIVE
PH UR STRIP: 6 (ref 5–8)
PLATELET # BLD AUTO: 366 K/UL (ref 150–400)
PMV BLD AUTO: 9.1 FL (ref 8.9–12.9)
POTASSIUM SERPL-SCNC: 3.3 MMOL/L (ref 3.5–5.1)
PROT UR STRIP-MCNC: ABNORMAL MG/DL
RBC # BLD AUTO: 3.41 M/UL (ref 4.1–5.7)
RBC #/AREA URNS HPF: ABNORMAL /HPF (ref 0–5)
RSV RNA SPEC QL NAA+PROBE: NOT DETECTED
RV+EV RNA SPEC QL NAA+PROBE: NOT DETECTED
SARS-COV-2 RNA RESP QL NAA+PROBE: NOT DETECTED
SODIUM SERPL-SCNC: 135 MMOL/L (ref 136–145)
SP GR UR REFRACTOMETRY: 1.03 (ref 1–1.03)
T4 FREE SERPL-MCNC: 5.5 NG/DL (ref 0.8–1.5)
TRIGL SERPL-MCNC: 74 MG/DL
TSH SERPL DL<=0.05 MIU/L-ACNC: <0.01 UIU/ML (ref 0.36–3.74)
UROBILINOGEN UR QL STRIP.AUTO: 1 EU/DL (ref 0.2–1)
VLDLC SERPL CALC-MCNC: 14.8 MG/DL
WBC # BLD AUTO: 6.6 K/UL (ref 4.1–11.1)
WBC URNS QL MICRO: ABNORMAL /HPF (ref 0–4)

## 2024-05-09 PROCEDURE — 83735 ASSAY OF MAGNESIUM: CPT

## 2024-05-09 PROCEDURE — 80307 DRUG TEST PRSMV CHEM ANLYZR: CPT

## 2024-05-09 PROCEDURE — 6360000002 HC RX W HCPCS: Performed by: FAMILY MEDICINE

## 2024-05-09 PROCEDURE — 2580000003 HC RX 258: Performed by: FAMILY MEDICINE

## 2024-05-09 PROCEDURE — 2060000000 HC ICU INTERMEDIATE R&B

## 2024-05-09 PROCEDURE — 94761 N-INVAS EAR/PLS OXIMETRY MLT: CPT

## 2024-05-09 PROCEDURE — 6370000000 HC RX 637 (ALT 250 FOR IP): Performed by: FAMILY MEDICINE

## 2024-05-09 PROCEDURE — 84443 ASSAY THYROID STIM HORMONE: CPT

## 2024-05-09 PROCEDURE — 93306 TTE W/DOPPLER COMPLETE: CPT

## 2024-05-09 PROCEDURE — 93010 ELECTROCARDIOGRAM REPORT: CPT | Performed by: SPECIALIST

## 2024-05-09 PROCEDURE — 36415 COLL VENOUS BLD VENIPUNCTURE: CPT

## 2024-05-09 PROCEDURE — 93306 TTE W/DOPPLER COMPLETE: CPT | Performed by: STUDENT IN AN ORGANIZED HEALTH CARE EDUCATION/TRAINING PROGRAM

## 2024-05-09 PROCEDURE — 6370000000 HC RX 637 (ALT 250 FOR IP): Performed by: STUDENT IN AN ORGANIZED HEALTH CARE EDUCATION/TRAINING PROGRAM

## 2024-05-09 PROCEDURE — 99232 SBSQ HOSP IP/OBS MODERATE 35: CPT | Performed by: STUDENT IN AN ORGANIZED HEALTH CARE EDUCATION/TRAINING PROGRAM

## 2024-05-09 PROCEDURE — 80048 BASIC METABOLIC PNL TOTAL CA: CPT

## 2024-05-09 PROCEDURE — 0202U NFCT DS 22 TRGT SARS-COV-2: CPT

## 2024-05-09 PROCEDURE — 80061 LIPID PANEL: CPT

## 2024-05-09 PROCEDURE — 81001 URINALYSIS AUTO W/SCOPE: CPT

## 2024-05-09 PROCEDURE — 6360000002 HC RX W HCPCS: Performed by: NURSE PRACTITIONER

## 2024-05-09 PROCEDURE — 84439 ASSAY OF FREE THYROXINE: CPT

## 2024-05-09 PROCEDURE — 85025 COMPLETE CBC W/AUTO DIFF WBC: CPT

## 2024-05-09 PROCEDURE — 83036 HEMOGLOBIN GLYCOSYLATED A1C: CPT

## 2024-05-09 RX ORDER — FUROSEMIDE 10 MG/ML
40 INJECTION INTRAMUSCULAR; INTRAVENOUS ONCE
Status: COMPLETED | OUTPATIENT
Start: 2024-05-09 | End: 2024-05-09

## 2024-05-09 RX ADMIN — QUETIAPINE FUMARATE 100 MG: 100 TABLET ORAL at 21:11

## 2024-05-09 RX ADMIN — SODIUM CHLORIDE, PRESERVATIVE FREE 10 ML: 5 INJECTION INTRAVENOUS at 21:11

## 2024-05-09 RX ADMIN — GABAPENTIN 300 MG: 300 CAPSULE ORAL at 08:10

## 2024-05-09 RX ADMIN — ENOXAPARIN SODIUM 40 MG: 100 INJECTION SUBCUTANEOUS at 08:10

## 2024-05-09 RX ADMIN — FUROSEMIDE 40 MG: 10 INJECTION, SOLUTION INTRAMUSCULAR; INTRAVENOUS at 09:14

## 2024-05-09 RX ADMIN — ATORVASTATIN CALCIUM 40 MG: 20 TABLET, FILM COATED ORAL at 21:11

## 2024-05-09 RX ADMIN — METOPROLOL TARTRATE 25 MG: 25 TABLET, FILM COATED ORAL at 10:49

## 2024-05-09 RX ADMIN — SODIUM CHLORIDE, PRESERVATIVE FREE 10 ML: 5 INJECTION INTRAVENOUS at 08:10

## 2024-05-09 RX ADMIN — ASPIRIN 81 MG: 81 TABLET, CHEWABLE ORAL at 08:10

## 2024-05-09 RX ADMIN — METOPROLOL TARTRATE 25 MG: 25 TABLET, FILM COATED ORAL at 21:11

## 2024-05-09 RX ADMIN — CLOPIDOGREL BISULFATE 75 MG: 75 TABLET ORAL at 08:10

## 2024-05-09 NOTE — CARE COORDINATION
Care Management Progress Note:   TOMAS called and spoke with Nata @ [facility], the half-way house patient is currently residing in [phone]. Per Nata, patient may return when medically cleared to dc. Patient will need transportation at time of dc, per patient he has transportation benefit through [employer] insurance.   ______________________  Destiney AN, RN  Care Management  5/9/2024  2:27 PM

## 2024-05-09 NOTE — PLAN OF CARE
Problem: Discharge Planning  Goal: Discharge to home or other facility with appropriate resources  5/9/2024 1755 by Xena Noguera RN  Outcome: Progressing  5/9/2024 1754 by Xena Noguera RN  Outcome: Adequate for Discharge     Problem: Pain  Goal: Verbalizes/displays adequate comfort level or baseline comfort level  5/9/2024 1755 by Xena Noguera RN  Outcome: Progressing  5/9/2024 1754 by Xena Noguera RN  Outcome: Adequate for Discharge     Problem: Safety - Adult  Goal: Free from fall injury  5/9/2024 1755 by Xena Noguera RN  Outcome: Progressing  5/9/2024 1754 by Xena Noguera RN  Outcome: Adequate for Discharge

## 2024-05-10 ENCOUNTER — APPOINTMENT (OUTPATIENT)
Facility: HOSPITAL | Age: 64
DRG: 322 | End: 2024-05-10
Payer: MEDICARE

## 2024-05-10 LAB
ACT BLD: 233 SECS (ref 79–138)
ACT BLD: 233 SECS (ref 79–138)
DEPRECATED S PYO AG THROAT QL EIA: NEGATIVE

## 2024-05-10 PROCEDURE — 92978 ENDOLUMINL IVUS OCT C 1ST: CPT | Performed by: STUDENT IN AN ORGANIZED HEALTH CARE EDUCATION/TRAINING PROGRAM

## 2024-05-10 PROCEDURE — 93458 L HRT ARTERY/VENTRICLE ANGIO: CPT | Performed by: STUDENT IN AN ORGANIZED HEALTH CARE EDUCATION/TRAINING PROGRAM

## 2024-05-10 PROCEDURE — 6370000000 HC RX 637 (ALT 250 FOR IP): Performed by: FAMILY MEDICINE

## 2024-05-10 PROCEDURE — 76536 US EXAM OF HEAD AND NECK: CPT

## 2024-05-10 PROCEDURE — C1874 STENT, COATED/COV W/DEL SYS: HCPCS | Performed by: STUDENT IN AN ORGANIZED HEALTH CARE EDUCATION/TRAINING PROGRAM

## 2024-05-10 PROCEDURE — C1769 GUIDE WIRE: HCPCS | Performed by: STUDENT IN AN ORGANIZED HEALTH CARE EDUCATION/TRAINING PROGRAM

## 2024-05-10 PROCEDURE — 92979 ENDOLUMINL IVUS OCT C EA: CPT | Performed by: STUDENT IN AN ORGANIZED HEALTH CARE EDUCATION/TRAINING PROGRAM

## 2024-05-10 PROCEDURE — 6370000000 HC RX 637 (ALT 250 FOR IP): Performed by: STUDENT IN AN ORGANIZED HEALTH CARE EDUCATION/TRAINING PROGRAM

## 2024-05-10 PROCEDURE — 2580000003 HC RX 258: Performed by: FAMILY MEDICINE

## 2024-05-10 PROCEDURE — 76937 US GUIDE VASCULAR ACCESS: CPT | Performed by: STUDENT IN AN ORGANIZED HEALTH CARE EDUCATION/TRAINING PROGRAM

## 2024-05-10 PROCEDURE — 4A023N7 MEASUREMENT OF CARDIAC SAMPLING AND PRESSURE, LEFT HEART, PERCUTANEOUS APPROACH: ICD-10-PCS | Performed by: STUDENT IN AN ORGANIZED HEALTH CARE EDUCATION/TRAINING PROGRAM

## 2024-05-10 PROCEDURE — B2111ZZ FLUOROSCOPY OF MULTIPLE CORONARY ARTERIES USING LOW OSMOLAR CONTRAST: ICD-10-PCS | Performed by: STUDENT IN AN ORGANIZED HEALTH CARE EDUCATION/TRAINING PROGRAM

## 2024-05-10 PROCEDURE — 92921 PR PRQ TRLUML CORONARY ANGIOPLASTY ADDL BRANCH: CPT | Performed by: STUDENT IN AN ORGANIZED HEALTH CARE EDUCATION/TRAINING PROGRAM

## 2024-05-10 PROCEDURE — 99153 MOD SED SAME PHYS/QHP EA: CPT | Performed by: STUDENT IN AN ORGANIZED HEALTH CARE EDUCATION/TRAINING PROGRAM

## 2024-05-10 PROCEDURE — B241ZZ3 ULTRASONOGRAPHY OF MULTIPLE CORONARY ARTERIES, INTRAVASCULAR: ICD-10-PCS | Performed by: STUDENT IN AN ORGANIZED HEALTH CARE EDUCATION/TRAINING PROGRAM

## 2024-05-10 PROCEDURE — 99223 1ST HOSP IP/OBS HIGH 75: CPT | Performed by: INTERNAL MEDICINE

## 2024-05-10 PROCEDURE — 93572 IV DOP VEL&/PRESS C FLO EA: CPT | Performed by: STUDENT IN AN ORGANIZED HEALTH CARE EDUCATION/TRAINING PROGRAM

## 2024-05-10 PROCEDURE — 2060000000 HC ICU INTERMEDIATE R&B

## 2024-05-10 PROCEDURE — 2500000003 HC RX 250 WO HCPCS: Performed by: STUDENT IN AN ORGANIZED HEALTH CARE EDUCATION/TRAINING PROGRAM

## 2024-05-10 PROCEDURE — 4A033BC MEASUREMENT OF ARTERIAL PRESSURE, CORONARY, PERCUTANEOUS APPROACH: ICD-10-PCS | Performed by: STUDENT IN AN ORGANIZED HEALTH CARE EDUCATION/TRAINING PROGRAM

## 2024-05-10 PROCEDURE — 87070 CULTURE OTHR SPECIMN AEROBIC: CPT

## 2024-05-10 PROCEDURE — 94761 N-INVAS EAR/PLS OXIMETRY MLT: CPT

## 2024-05-10 PROCEDURE — 93005 ELECTROCARDIOGRAM TRACING: CPT | Performed by: STUDENT IN AN ORGANIZED HEALTH CARE EDUCATION/TRAINING PROGRAM

## 2024-05-10 PROCEDURE — 02703ZZ DILATION OF CORONARY ARTERY, ONE ARTERY, PERCUTANEOUS APPROACH: ICD-10-PCS | Performed by: STUDENT IN AN ORGANIZED HEALTH CARE EDUCATION/TRAINING PROGRAM

## 2024-05-10 PROCEDURE — 027034Z DILATION OF CORONARY ARTERY, ONE ARTERY WITH DRUG-ELUTING INTRALUMINAL DEVICE, PERCUTANEOUS APPROACH: ICD-10-PCS | Performed by: STUDENT IN AN ORGANIZED HEALTH CARE EDUCATION/TRAINING PROGRAM

## 2024-05-10 PROCEDURE — C1887 CATHETER, GUIDING: HCPCS | Performed by: STUDENT IN AN ORGANIZED HEALTH CARE EDUCATION/TRAINING PROGRAM

## 2024-05-10 PROCEDURE — 93571 IV DOP VEL&/PRESS C FLO 1ST: CPT | Performed by: STUDENT IN AN ORGANIZED HEALTH CARE EDUCATION/TRAINING PROGRAM

## 2024-05-10 PROCEDURE — C1894 INTRO/SHEATH, NON-LASER: HCPCS | Performed by: STUDENT IN AN ORGANIZED HEALTH CARE EDUCATION/TRAINING PROGRAM

## 2024-05-10 PROCEDURE — 87880 STREP A ASSAY W/OPTIC: CPT

## 2024-05-10 PROCEDURE — 85347 COAGULATION TIME ACTIVATED: CPT

## 2024-05-10 PROCEDURE — 99152 MOD SED SAME PHYS/QHP 5/>YRS: CPT | Performed by: STUDENT IN AN ORGANIZED HEALTH CARE EDUCATION/TRAINING PROGRAM

## 2024-05-10 PROCEDURE — C9600 PERC DRUG-EL COR STENT SING: HCPCS | Performed by: STUDENT IN AN ORGANIZED HEALTH CARE EDUCATION/TRAINING PROGRAM

## 2024-05-10 PROCEDURE — 6360000002 HC RX W HCPCS: Performed by: STUDENT IN AN ORGANIZED HEALTH CARE EDUCATION/TRAINING PROGRAM

## 2024-05-10 PROCEDURE — 99232 SBSQ HOSP IP/OBS MODERATE 35: CPT | Performed by: STUDENT IN AN ORGANIZED HEALTH CARE EDUCATION/TRAINING PROGRAM

## 2024-05-10 PROCEDURE — C1725 CATH, TRANSLUMIN NON-LASER: HCPCS | Performed by: STUDENT IN AN ORGANIZED HEALTH CARE EDUCATION/TRAINING PROGRAM

## 2024-05-10 PROCEDURE — C1753 CATH, INTRAVAS ULTRASOUND: HCPCS | Performed by: STUDENT IN AN ORGANIZED HEALTH CARE EDUCATION/TRAINING PROGRAM

## 2024-05-10 PROCEDURE — 92928 PRQ TCAT PLMT NTRAC ST 1 LES: CPT | Performed by: STUDENT IN AN ORGANIZED HEALTH CARE EDUCATION/TRAINING PROGRAM

## 2024-05-10 PROCEDURE — 2709999900 HC NON-CHARGEABLE SUPPLY: Performed by: STUDENT IN AN ORGANIZED HEALTH CARE EDUCATION/TRAINING PROGRAM

## 2024-05-10 PROCEDURE — 6360000004 HC RX CONTRAST MEDICATION: Performed by: STUDENT IN AN ORGANIZED HEALTH CARE EDUCATION/TRAINING PROGRAM

## 2024-05-10 PROCEDURE — 92920 PRQ TRLUML C ANGIOP 1ART&/BR: CPT | Performed by: STUDENT IN AN ORGANIZED HEALTH CARE EDUCATION/TRAINING PROGRAM

## 2024-05-10 DEVICE — STENT ONYXNG30018UX ONYX 3.00X18RX
Type: IMPLANTABLE DEVICE | Status: FUNCTIONAL
Brand: ONYX FRONTIER™

## 2024-05-10 RX ORDER — SODIUM CHLORIDE 0.9 % (FLUSH) 0.9 %
5-40 SYRINGE (ML) INJECTION PRN
Status: DISCONTINUED | OUTPATIENT
Start: 2024-05-10 | End: 2024-05-13 | Stop reason: HOSPADM

## 2024-05-10 RX ORDER — SODIUM CHLORIDE 9 MG/ML
INJECTION, SOLUTION INTRAVENOUS PRN
Status: DISCONTINUED | OUTPATIENT
Start: 2024-05-10 | End: 2024-05-13 | Stop reason: HOSPADM

## 2024-05-10 RX ORDER — DIPHENHYDRAMINE HYDROCHLORIDE 50 MG/ML
INJECTION INTRAMUSCULAR; INTRAVENOUS PRN
Status: DISCONTINUED | OUTPATIENT
Start: 2024-05-10 | End: 2024-05-10 | Stop reason: HOSPADM

## 2024-05-10 RX ORDER — VERAPAMIL HYDROCHLORIDE 2.5 MG/ML
INJECTION, SOLUTION INTRAVENOUS PRN
Status: DISCONTINUED | OUTPATIENT
Start: 2024-05-10 | End: 2024-05-10 | Stop reason: HOSPADM

## 2024-05-10 RX ORDER — HEPARIN SODIUM 1000 [USP'U]/ML
INJECTION, SOLUTION INTRAVENOUS; SUBCUTANEOUS PRN
Status: DISCONTINUED | OUTPATIENT
Start: 2024-05-10 | End: 2024-05-10 | Stop reason: HOSPADM

## 2024-05-10 RX ORDER — ACETAMINOPHEN 325 MG/1
650 TABLET ORAL EVERY 4 HOURS PRN
Status: DISCONTINUED | OUTPATIENT
Start: 2024-05-10 | End: 2024-05-13 | Stop reason: HOSPADM

## 2024-05-10 RX ORDER — FENTANYL CITRATE 50 UG/ML
INJECTION, SOLUTION INTRAMUSCULAR; INTRAVENOUS PRN
Status: DISCONTINUED | OUTPATIENT
Start: 2024-05-10 | End: 2024-05-10 | Stop reason: HOSPADM

## 2024-05-10 RX ORDER — SODIUM CHLORIDE 0.9 % (FLUSH) 0.9 %
5-40 SYRINGE (ML) INJECTION EVERY 12 HOURS SCHEDULED
Status: DISCONTINUED | OUTPATIENT
Start: 2024-05-10 | End: 2024-05-13 | Stop reason: HOSPADM

## 2024-05-10 RX ORDER — LIDOCAINE HYDROCHLORIDE 10 MG/ML
INJECTION, SOLUTION INFILTRATION; PERINEURAL PRN
Status: DISCONTINUED | OUTPATIENT
Start: 2024-05-10 | End: 2024-05-10 | Stop reason: HOSPADM

## 2024-05-10 RX ORDER — MIDAZOLAM HYDROCHLORIDE 1 MG/ML
INJECTION INTRAMUSCULAR; INTRAVENOUS PRN
Status: DISCONTINUED | OUTPATIENT
Start: 2024-05-10 | End: 2024-05-10 | Stop reason: HOSPADM

## 2024-05-10 RX ORDER — HEPARIN SODIUM 200 [USP'U]/100ML
INJECTION, SOLUTION INTRAVENOUS PRN
Status: DISCONTINUED | OUTPATIENT
Start: 2024-05-10 | End: 2024-05-10 | Stop reason: HOSPADM

## 2024-05-10 RX ADMIN — QUETIAPINE FUMARATE 100 MG: 100 TABLET ORAL at 20:24

## 2024-05-10 RX ADMIN — ASPIRIN 81 MG: 81 TABLET, CHEWABLE ORAL at 08:07

## 2024-05-10 RX ADMIN — ATORVASTATIN CALCIUM 40 MG: 20 TABLET, FILM COATED ORAL at 20:24

## 2024-05-10 RX ADMIN — SODIUM CHLORIDE, PRESERVATIVE FREE 10 ML: 5 INJECTION INTRAVENOUS at 20:24

## 2024-05-10 RX ADMIN — CLOPIDOGREL BISULFATE 75 MG: 75 TABLET ORAL at 08:07

## 2024-05-10 RX ADMIN — SODIUM CHLORIDE, PRESERVATIVE FREE 10 ML: 5 INJECTION INTRAVENOUS at 08:07

## 2024-05-10 RX ADMIN — METOPROLOL TARTRATE 25 MG: 25 TABLET, FILM COATED ORAL at 08:07

## 2024-05-10 RX ADMIN — ACETAMINOPHEN 650 MG: 325 TABLET ORAL at 05:01

## 2024-05-10 RX ADMIN — GABAPENTIN 300 MG: 300 CAPSULE ORAL at 08:07

## 2024-05-10 ASSESSMENT — PAIN SCALES - GENERAL: PAINLEVEL_OUTOF10: 0

## 2024-05-10 NOTE — PLAN OF CARE
Problem: Discharge Planning  Goal: Discharge to home or other facility with appropriate resources  5/10/2024 1300 by Gustavo Pedersen RN  Outcome: Progressing  Flowsheets (Taken 5/10/2024 0750)  Discharge to home or other facility with appropriate resources: Identify barriers to discharge with patient and caregiver  5/10/2024 0206 by Toño Thompson RN  Outcome: Progressing     Problem: Pain  Goal: Verbalizes/displays adequate comfort level or baseline comfort level  5/10/2024 1300 by Gustavo Pedersen RN  Outcome: Progressing  5/10/2024 0206 by Toño Thompson, RN  Outcome: Progressing     Problem: Safety - Adult  Goal: Free from fall injury  5/10/2024 1300 by Gustavo Pedersen RN  Outcome: Progressing  5/10/2024 0206 by Toño Thompson, RN  Outcome: Progressing

## 2024-05-10 NOTE — DISCHARGE INSTRUCTIONS
Radial Cardiac Catheterization/Angiography Discharge Instructions   It is normal to feel tired the first couple days. Take it easy and follow the physician’s instructions.   CHECK THE CATHETER INSERTION SITE DAILY:   Remove the wrist dressing 24 hours after the procedure.   You may shower 24 hours after the procedure. Wash with soap and water and pat dry.   Gentle cleaning of the site with soap and water is sufficient, cover with a dry clean dressing or bandage. Do not apply creams or powders to the area.   No soaking the wrist for 3 days.   Leave the puncture site open to air after 24 hours post-procedure.   CALL THE PHYSICIANS:   If the site becomes red, swollen or feels warm to the touch   If there is bleeding or drainage or if there is unusual pain at the radial site.   If there is any minor oozing, you may apply a band-aid and remove after 12 hours.   If the bleeding continues, hold pressure with the middle finger against the puncture site and the thumb against the back of the wrist,call 911 to be transported to the hospital.   DO NOT DRIVE YOURSELF, OR HAVE ANYONE ELSE DRIVE YOU - CALL 911.   ACTIVITY:   For the first 24 hours do not manipulate the wrist.   No lifting, pushing or pulling over 3-5 pounds with the affected wrist for 7 daysand no straining the insertion site. Do not life grocery bags or the garbage can, do not run the vacuum  or  for 7 days.   Start with short walks as in the hospital and gradually increase as tolerated each day. It is recommended to walk 30 minutes 5-7 days per week.   Follow your physician’s instructions on activity. Avoid walking outside in extremes of heat or cold. Walk inside when it is cold and windy or hot and humid.   Things to keep in mind:   No driving for at least 24 hours, or as designated by your physician.   Limit the number of times you go up and down the stairs   Take rests and pace yourself with activity.   Be careful and do not strain with bowel

## 2024-05-10 NOTE — SIGNIFICANT EVENT
Notified by nursing that tele room reporting pt with drops of HR down to 30's with rise back to 50's .  BP has been stable overnight.

## 2024-05-10 NOTE — PLAN OF CARE
Problem: Discharge Planning  Goal: Discharge to home or other facility with appropriate resources  5/10/2024 0206 by Toño Thompson RN  Outcome: Progressing  5/9/2024 1755 by Xena Noguera RN  Outcome: Progressing  5/9/2024 1754 by Xena Noguera RN  Outcome: Adequate for Discharge     Problem: Pain  Goal: Verbalizes/displays adequate comfort level or baseline comfort level  5/10/2024 0206 by Toño Thompson RN  Outcome: Progressing  5/9/2024 1755 by Xena Noguera RN  Outcome: Progressing  5/9/2024 1754 by Xena Noguera RN  Outcome: Adequate for Discharge     Problem: Safety - Adult  Goal: Free from fall injury  5/10/2024 0206 by Toño Thompson RN  Outcome: Progressing  5/9/2024 1755 by Xena Noguera RN  Outcome: Progressing  5/9/2024 1754 by Xena Noguera RN  Outcome: Adequate for Discharge

## 2024-05-10 NOTE — CARE COORDINATION
Care Management Progress Note:   Per IDR, patient for cardiac cath today. EDOD is 5/11 pending procedure outcome.   ______________________  Destiney AN, MATY  Care Management  5/10/2024  11:44 AM

## 2024-05-10 NOTE — CARDIO/PULMONARY
[facility] Cardiopulmonary Rehab: 62 yo male admitted from cardiologist office with abnormal heart rhythm (5/8).  S/P PCI to LCX (5/10/24).    Patient will be eligible for outpatient cardiac rehab program, pending completion of OM revascularization procedure.  He will be contacted by phone to discuss benefits of cardiac rehab.

## 2024-05-10 NOTE — PROCEDURES
BRIEF PROCEDURE NOTE    Date of Procedure: 5/10/2024   Preoperative Diagnosis:  pvcs  Postoperative Diagnosis: cad    Procedure: Left heart cath, coronary angiography, moderate sedation, iFR lad, iFR lcx, angioplasty l-PDA, pci w. Triston Lcx, ivus lcx  Interventional Cardiologist: Ronnie Bhatia DO  Assistant: None  Anesthesia: local + IV moderate sedation   I administered moderate sedation throughout this procedure. An independent trained observer pushed medications at my direction, and monitored the patient’s level of consciousness and physiological status throughout.  Estimated Blood Loss: Minimal    Access: right radial artery, 6F  Catheters:  Left coronary:JL 3.5, 5f  Right coronary: JR 4, 5f    Findings:   L Main:large caliber, mild distal left main disease  LAD:large caliber, ostial lad 50-60% stenosis, mid-vessel 60-70% stenosis (iFR 0.82 with ischemia across proximal lad), small diagonals with diffuse mild to moderate disease  LCx: large caliber, dominant, proximal lcx with tubular 90% stenosis with involvement of om ostium (1,1,1), moderate om with ~60-70% stenopsis, distal Lcx into l-PDA with long stent, mild isr within distal lcx with severe >80-90% fibrotic ISR within proximal l-pda  RCA: non-dominant    LVEDP:  <Ma10 mmhg    PCI:    Ebu 3.5 6F guide    Gonsalo blue into distal lcx.    Dilated l-PDA ISR with 3.0 NC and 3.0 scoreflex balloon at 22 marin.  Residual severe waste on balloon.  Ivus shows severe fibrotic stenosis      Proximal lcx dilated with 3.0 nc balloon.  Wired om1.  Proximal lcx stented with 3.0x18 leighton triston, post-dilated with 3.0 nc balloon. Timoteo 3 flow into om without residual severe stenosis, deferred further intervention of OM.      Timoteo 3 flow pre and post      Specimens Removed: None    Implants: leighton triston    Closure Device: radial TR band    See full cath note.    Complications: none      Findings:  1. Proximal lad stenosis with iFR 0.82  2. Severe stenosis of proximal lcx with severe ISR

## 2024-05-11 LAB
ANION GAP SERPL CALC-SCNC: 5 MMOL/L (ref 5–15)
ANION GAP SERPL CALC-SCNC: 6 MMOL/L (ref 5–15)
BUN SERPL-MCNC: 14 MG/DL (ref 6–20)
BUN SERPL-MCNC: 18 MG/DL (ref 6–20)
BUN/CREAT SERPL: 23 (ref 12–20)
BUN/CREAT SERPL: 30 (ref 12–20)
CALCIUM SERPL-MCNC: 5.8 MG/DL (ref 8.5–10.1)
CALCIUM SERPL-MCNC: 9 MG/DL (ref 8.5–10.1)
CHLORIDE SERPL-SCNC: 105 MMOL/L (ref 97–108)
CHLORIDE SERPL-SCNC: 119 MMOL/L (ref 97–108)
CO2 SERPL-SCNC: 21 MMOL/L (ref 21–32)
CO2 SERPL-SCNC: 26 MMOL/L (ref 21–32)
CREAT SERPL-MCNC: 0.47 MG/DL (ref 0.7–1.3)
CREAT SERPL-MCNC: 0.78 MG/DL (ref 0.7–1.3)
CRP SERPL-MCNC: 7.58 MG/DL (ref 0–0.3)
ERYTHROCYTE [SEDIMENTATION RATE] IN BLOOD: 108 MM/HR (ref 0–20)
GLUCOSE SERPL-MCNC: 103 MG/DL (ref 65–100)
GLUCOSE SERPL-MCNC: 136 MG/DL (ref 65–100)
MAGNESIUM SERPL-MCNC: 1.6 MG/DL (ref 1.6–2.4)
PHOSPHATE SERPL-MCNC: 2.8 MG/DL (ref 2.6–4.7)
POTASSIUM SERPL-SCNC: 2.7 MMOL/L (ref 3.5–5.1)
POTASSIUM SERPL-SCNC: 3.9 MMOL/L (ref 3.5–5.1)
SODIUM SERPL-SCNC: 136 MMOL/L (ref 136–145)
SODIUM SERPL-SCNC: 146 MMOL/L (ref 136–145)

## 2024-05-11 PROCEDURE — 6370000000 HC RX 637 (ALT 250 FOR IP): Performed by: FAMILY MEDICINE

## 2024-05-11 PROCEDURE — 2580000003 HC RX 258: Performed by: FAMILY MEDICINE

## 2024-05-11 PROCEDURE — 36415 COLL VENOUS BLD VENIPUNCTURE: CPT

## 2024-05-11 PROCEDURE — 99233 SBSQ HOSP IP/OBS HIGH 50: CPT | Performed by: INTERNAL MEDICINE

## 2024-05-11 PROCEDURE — 83735 ASSAY OF MAGNESIUM: CPT

## 2024-05-11 PROCEDURE — 6360000002 HC RX W HCPCS: Performed by: FAMILY MEDICINE

## 2024-05-11 PROCEDURE — 6360000002 HC RX W HCPCS: Performed by: INTERNAL MEDICINE

## 2024-05-11 PROCEDURE — 6370000000 HC RX 637 (ALT 250 FOR IP): Performed by: NURSE PRACTITIONER

## 2024-05-11 PROCEDURE — 2060000000 HC ICU INTERMEDIATE R&B

## 2024-05-11 PROCEDURE — 84100 ASSAY OF PHOSPHORUS: CPT

## 2024-05-11 PROCEDURE — 94761 N-INVAS EAR/PLS OXIMETRY MLT: CPT

## 2024-05-11 PROCEDURE — 2500000003 HC RX 250 WO HCPCS: Performed by: NURSE PRACTITIONER

## 2024-05-11 PROCEDURE — 86376 MICROSOMAL ANTIBODY EACH: CPT

## 2024-05-11 PROCEDURE — 80048 BASIC METABOLIC PNL TOTAL CA: CPT

## 2024-05-11 PROCEDURE — 84445 ASSAY OF TSI GLOBULIN: CPT

## 2024-05-11 PROCEDURE — 6370000000 HC RX 637 (ALT 250 FOR IP)

## 2024-05-11 PROCEDURE — 86140 C-REACTIVE PROTEIN: CPT

## 2024-05-11 PROCEDURE — 85652 RBC SED RATE AUTOMATED: CPT

## 2024-05-11 PROCEDURE — 2580000003 HC RX 258: Performed by: STUDENT IN AN ORGANIZED HEALTH CARE EDUCATION/TRAINING PROGRAM

## 2024-05-11 RX ORDER — CALCIUM GLUCONATE 20 MG/ML
2000 INJECTION, SOLUTION INTRAVENOUS ONCE
Status: COMPLETED | OUTPATIENT
Start: 2024-05-11 | End: 2024-05-11

## 2024-05-11 RX ORDER — MAGNESIUM SULFATE IN WATER 40 MG/ML
2000 INJECTION, SOLUTION INTRAVENOUS ONCE
Status: COMPLETED | OUTPATIENT
Start: 2024-05-11 | End: 2024-05-11

## 2024-05-11 RX ORDER — CALCIUM GLUCONATE 94 MG/ML
2000 INJECTION, SOLUTION INTRAVENOUS ONCE
Status: DISCONTINUED | OUTPATIENT
Start: 2024-05-11 | End: 2024-05-11

## 2024-05-11 RX ORDER — POTASSIUM CHLORIDE 750 MG/1
40 TABLET, FILM COATED, EXTENDED RELEASE ORAL EVERY 4 HOURS
Status: DISCONTINUED | OUTPATIENT
Start: 2024-05-11 | End: 2024-05-11

## 2024-05-11 RX ADMIN — SODIUM CHLORIDE, PRESERVATIVE FREE 10 ML: 5 INJECTION INTRAVENOUS at 09:08

## 2024-05-11 RX ADMIN — POTASSIUM CHLORIDE 40 MEQ: 750 TABLET, FILM COATED, EXTENDED RELEASE ORAL at 09:07

## 2024-05-11 RX ADMIN — POTASSIUM CHLORIDE 40 MEQ: 750 TABLET, FILM COATED, EXTENDED RELEASE ORAL at 12:59

## 2024-05-11 RX ADMIN — SODIUM CHLORIDE, PRESERVATIVE FREE 10 ML: 5 INJECTION INTRAVENOUS at 20:48

## 2024-05-11 RX ADMIN — GABAPENTIN 300 MG: 300 CAPSULE ORAL at 09:07

## 2024-05-11 RX ADMIN — QUETIAPINE FUMARATE 100 MG: 100 TABLET ORAL at 20:47

## 2024-05-11 RX ADMIN — ASPIRIN 81 MG: 81 TABLET, CHEWABLE ORAL at 09:07

## 2024-05-11 RX ADMIN — ATORVASTATIN CALCIUM 40 MG: 20 TABLET, FILM COATED ORAL at 20:47

## 2024-05-11 RX ADMIN — POTASSIUM CHLORIDE 40 MEQ: 750 TABLET, FILM COATED, EXTENDED RELEASE ORAL at 04:58

## 2024-05-11 RX ADMIN — CLOPIDOGREL BISULFATE 75 MG: 75 TABLET ORAL at 09:07

## 2024-05-11 RX ADMIN — ENOXAPARIN SODIUM 40 MG: 100 INJECTION SUBCUTANEOUS at 09:08

## 2024-05-11 RX ADMIN — MAGNESIUM SULFATE HEPTAHYDRATE 2000 MG: 40 INJECTION, SOLUTION INTRAVENOUS at 09:12

## 2024-05-11 RX ADMIN — CALCIUM GLUCONATE 2000 MG: 20 INJECTION, SOLUTION INTRAVENOUS at 03:45

## 2024-05-11 RX ADMIN — POTASSIUM CHLORIDE 10 MEQ: 7.46 INJECTION, SOLUTION INTRAVENOUS at 03:42

## 2024-05-11 RX ADMIN — METOPROLOL TARTRATE 12.5 MG: 25 TABLET, FILM COATED ORAL at 09:07

## 2024-05-11 ASSESSMENT — PAIN SCALES - GENERAL
PAINLEVEL_OUTOF10: 0
PAINLEVEL_OUTOF10: 0

## 2024-05-12 LAB
BACTERIA SPEC CULT: NORMAL
EKG ATRIAL RATE: 67 BPM
EKG DIAGNOSIS: NORMAL
EKG P AXIS: 56 DEGREES
EKG P-R INTERVAL: 118 MS
EKG Q-T INTERVAL: 432 MS
EKG QRS DURATION: 96 MS
EKG QTC CALCULATION (BAZETT): 456 MS
EKG R AXIS: -39 DEGREES
EKG T AXIS: 52 DEGREES
EKG VENTRICULAR RATE: 67 BPM
SERVICE CMNT-IMP: NORMAL
THYROPEROXIDASE AB SERPL-ACNC: 12 IU/ML (ref 0–34)

## 2024-05-12 PROCEDURE — 1100000000 HC RM PRIVATE

## 2024-05-12 PROCEDURE — 2580000003 HC RX 258: Performed by: STUDENT IN AN ORGANIZED HEALTH CARE EDUCATION/TRAINING PROGRAM

## 2024-05-12 PROCEDURE — 93010 ELECTROCARDIOGRAM REPORT: CPT | Performed by: INTERNAL MEDICINE

## 2024-05-12 PROCEDURE — 6370000000 HC RX 637 (ALT 250 FOR IP): Performed by: INTERNAL MEDICINE

## 2024-05-12 PROCEDURE — 6370000000 HC RX 637 (ALT 250 FOR IP): Performed by: FAMILY MEDICINE

## 2024-05-12 PROCEDURE — 99233 SBSQ HOSP IP/OBS HIGH 50: CPT | Performed by: INTERNAL MEDICINE

## 2024-05-12 PROCEDURE — 94761 N-INVAS EAR/PLS OXIMETRY MLT: CPT

## 2024-05-12 PROCEDURE — 6360000002 HC RX W HCPCS: Performed by: FAMILY MEDICINE

## 2024-05-12 PROCEDURE — 2580000003 HC RX 258: Performed by: FAMILY MEDICINE

## 2024-05-12 PROCEDURE — 2700000000 HC OXYGEN THERAPY PER DAY

## 2024-05-12 RX ORDER — NAPROXEN 250 MG/1
250 TABLET ORAL 2 TIMES DAILY WITH MEALS
Status: DISCONTINUED | OUTPATIENT
Start: 2024-05-12 | End: 2024-05-13 | Stop reason: HOSPADM

## 2024-05-12 RX ADMIN — SODIUM CHLORIDE, PRESERVATIVE FREE 10 ML: 5 INJECTION INTRAVENOUS at 21:12

## 2024-05-12 RX ADMIN — SODIUM CHLORIDE, PRESERVATIVE FREE 10 ML: 5 INJECTION INTRAVENOUS at 09:25

## 2024-05-12 RX ADMIN — ASPIRIN 81 MG: 81 TABLET, CHEWABLE ORAL at 09:24

## 2024-05-12 RX ADMIN — SODIUM CHLORIDE, PRESERVATIVE FREE 10 ML: 5 INJECTION INTRAVENOUS at 21:13

## 2024-05-12 RX ADMIN — CLOPIDOGREL BISULFATE 75 MG: 75 TABLET ORAL at 09:24

## 2024-05-12 RX ADMIN — ATORVASTATIN CALCIUM 40 MG: 20 TABLET, FILM COATED ORAL at 21:12

## 2024-05-12 RX ADMIN — GABAPENTIN 300 MG: 300 CAPSULE ORAL at 09:24

## 2024-05-12 RX ADMIN — ENOXAPARIN SODIUM 40 MG: 100 INJECTION SUBCUTANEOUS at 09:24

## 2024-05-12 RX ADMIN — NAPROXEN 250 MG: 250 TABLET ORAL at 11:26

## 2024-05-12 RX ADMIN — QUETIAPINE FUMARATE 100 MG: 100 TABLET ORAL at 21:12

## 2024-05-12 RX ADMIN — NAPROXEN 250 MG: 250 TABLET ORAL at 17:17

## 2024-05-12 ASSESSMENT — PAIN DESCRIPTION - ORIENTATION
ORIENTATION: LEFT

## 2024-05-12 ASSESSMENT — PAIN SCALES - GENERAL
PAINLEVEL_OUTOF10: 8
PAINLEVEL_OUTOF10: 0
PAINLEVEL_OUTOF10: 0
PAINLEVEL_OUTOF10: 5
PAINLEVEL_OUTOF10: 6

## 2024-05-12 ASSESSMENT — PAIN DESCRIPTION - FREQUENCY
FREQUENCY: CONTINUOUS
FREQUENCY: CONTINUOUS

## 2024-05-12 ASSESSMENT — PAIN DESCRIPTION - ONSET
ONSET: GRADUAL
ONSET: ON-GOING

## 2024-05-12 ASSESSMENT — PAIN DESCRIPTION - PAIN TYPE
TYPE: CHRONIC PAIN
TYPE: CHRONIC PAIN

## 2024-05-12 ASSESSMENT — PAIN DESCRIPTION - LOCATION
LOCATION: SHOULDER

## 2024-05-12 ASSESSMENT — PAIN - FUNCTIONAL ASSESSMENT
PAIN_FUNCTIONAL_ASSESSMENT: PREVENTS OR INTERFERES SOME ACTIVE ACTIVITIES AND ADLS

## 2024-05-12 ASSESSMENT — PAIN DESCRIPTION - DESCRIPTORS
DESCRIPTORS: ACHING

## 2024-05-12 NOTE — PLAN OF CARE
Problem: Discharge Planning  Goal: Discharge to home or other facility with appropriate resources  5/12/2024 0257 by Natan Zuleta RN  Outcome: Progressing  5/11/2024 1748 by Lala Wilson RN  Outcome: Progressing     Problem: Pain  Goal: Verbalizes/displays adequate comfort level or baseline comfort level  5/12/2024 0257 by Natan Zuleta RN  Outcome: Progressing  5/11/2024 1748 by Lala Wilson RN  Outcome: Progressing     Problem: Safety - Adult  Goal: Free from fall injury  5/12/2024 0257 by Natan Zuleta RN  Outcome: Progressing  5/11/2024 1748 by Lala Wilson RN  Outcome: Progressing

## 2024-05-12 NOTE — CONSULTS
[facility] Cardiology   Cardiac Electrophysiology Hospital Care Note                   [x]Initial visit     [ ]Established visit     Patient Name: Dannie James - :1960 - MRN:[id number]   Primary Cardiologist: Florentin Hurley MD   Electrophysiologist: Donald Tamez MD     Reason for visit: PVCs     HPI:   Mr. James is a 63 y.o. male who is seen for evaluation/management of PVCs as requested by Dr Jannette Bhatia is concerned about frequent PVCs causing cardiomyopathy  He is waiting to have cardiac cath today    He presented to the ER on 2024 with SOB & fatigue, had reportedly seen by Dr Hurley that day & was encouraged to go to the ER for evaluation.     Initial ECG showed sinus rhythm 64 bpm with frequent PVCs.  Tele has shown sinus rhythm/sinus bradycardia with PVCs.     Hypokalemic (3.3-3.4) with normal renal function.  HS troponin unremarkable.  Free T4 elevated & TSH <0.01.     Echo on 2024 showed LVEF 50-55% with mild LVH, mild to moderate AS with severely calcified right & noncoronary cusps (mean grad 16 mmHg, FRACISCO 1.4 cm2), mildly dilated sinus of Valsalva.     LHC pending.     Holter in 2024 showed mainly sinus rhythm with HR  bpm (avg 61 bpm).  Rare PACs.  PVC burden 5% with bigeminy & trigeminy.     BP controlled.       Previous:   Right AKA in .     S/p femoral bypass in .        Assessment and Plan     PVCs:    Holter in 2024 showed burden 5%.  TSH <0.01 & free T4 elevated; this may be a contributing factor to frequent PVCs too.  Echo showed LVEF 50-55% with mild to moderate AS.  LHC pending.  Currently receiving metoprolol 12.5 mg po bid.   Sinus rate will not tolerate more medication  He is not candidate for amiodarone given thyroid problem  I recommend work up and treat hyperthyroidism  At 5% burden I do not think he can develop cardiomyopathy from PVC  He does not feel PVCs  I recommend him not to do PVC ablation yet  If he has CHF symptoms or drop in LVEF in 
PSYCHIATRY CONSULT NOTE    REASON FOR CONSULT: patient request      HISTORY OF PRESENTING COMPLAINT:  Dannie James is a 63 y.o. White (non-) male who is currently admitted to the medical floor at [facility]. He reports depression due to his currently situation in life. He lives in a sober house with the [employer] and is upset that he is living in a house with 18 men and does not feel he has any control over his life. He reports a long substance abuse history, particularly methamphetamine and cocaine. He went to treatment 9 months ago and has been sober since. He is aware that this use likely caused his heart conditions. He has been admitted to inpatient psychiatry many times over the years. He denies SI/HI/AH/VH. He is working with his  at [facility] for housing that he is able to afford based on his income. He is not able to save money for an apartment while paying the rent for his current room. He feels that he is able to manage his depression as he has been dealing with it for much of his life.      PAST PSYCHIATRIC HISTORY and SUBSTANCE ABUSE HISTORY:  Hx methamphetamine and cocaine - clean 9 months      PAST MEDICAL HISTORY:    Please see H&P for details.     Past Medical History:   Diagnosis Date    Hypercholesterolemia     MI (myocardial infarction) (HCC)     Spinal stenosis      Prior to Admission medications    Medication Sig Start Date End Date Taking? Authorizing Provider   DULoxetine (CYMBALTA) 30 MG extended release capsule Take 1 capsule by mouth daily  Patient not taking: Reported on 5/8/2024   Yes Usman Smith MD   gabapentin (NEURONTIN) 300 MG capsule Take 1 capsule by mouth 3 times daily.   Yes Usman Smith MD   clopidogrel (PLAVIX) 75 MG tablet Take 1 tablet by mouth daily   Yes Usman Smith MD   QUEtiapine (SEROQUEL) 100 MG tablet Take 1 tablet by mouth 2 times daily   Yes Usman Smith MD   aspirin 81 MG chewable tablet Take 
This visit was entirely based on review of the electronic record and a telephone conversation with the patient.  I was unable to  see the patient face-to-face and there will be no charge.    This is a 63-year-old gentleman with a history of ventricular arrhythmias who was referred to [facility] for evaluation and management.  Initial EKG was notable for sinus rhythm with frequent PVCs.  Echocardiogram revealed an EF of 50%.  Initial laboratory data demonstrated a sodium of 135, potassium 3.4, glucose 120, albumin 2.8, and abnormal thyroid function test as indicated below.  White count 5.9 with a hemoglobin 11.1 and a normal differential.  Admission chest x-ray was unremarkable with respect to the thyroid.    Admission laboratory test notable for TSH of less than 0.01 with a free T4 of 5.5.  The patient tells me today that he has no past medical history for thyroid disease and in fact he has a normal TSH approximately 1 year ago.  He describes to me a 2 to 3-week history of swelling in his neck with pain and tenderness as well as a sore throat with difficulty swallowing.  There is no history of amiodarone medication as far as I can tell.  He denies symptoms of hyperthyroidism.  Notably he denies palpitations, or chest pain.  He does complain of shortness of breath.    Vital signs indicate a blood pressure 129/63 pulse 57 (range 45-70)  Temperature 97.5.  Tmax 100.8.    Impression  1.  Ventricular arrhythmias being followed by cardiology.  The patient may be undergoing cardiac catheterization today  2.  Subacute thyroiditis based on classic signs and symptoms as well as TSH suppression with elevation in free T4    Recommendation  1.  Thyroid ultrasound is recommended primarily to rule out thyroid abscess.  I suspect the ultrasound will be consistent with thyroiditis  2.  Pending the results of the thyroid ultrasound, I have ordered a sed rate and thyroid antibodies  3.  If the diagnosis of 
  OBJECTIVE:  Wt Readings from Last 3 Encounters:   05/08/24 74.8 kg (165 lb)   05/17/23 71 kg (156 lb 8 oz)     No intake/output data recorded.  No intake/output data recorded.    Physical Exam:    Vitals:   Vitals:    05/08/24 1149 05/08/24 1225 05/08/24 1449 05/08/24 1552   BP: 114/63 (!) 113/56     Pulse: 83 57 80 82   Resp: 19 24 21 20   Temp:       TempSrc:       SpO2: 96% 96% 94% 95%   Weight:       Height:         Telemetry: NSR/SB with PVC, PACs    Gen: Well-developed, well-nourished, in no acute distress  Neck: Supple, No JVD,  Carotid Bruit + , no swelling  Resp: No accessory muscle use, Clear breath sounds, No rales or rhonchi  Card: Irregular Rate,Rhythm, Normal S1, S2, 2/6 systolic murmur, No rubs or gallop.   Abd:   Soft, non-tender, non-distended, BS+   MSK: No cyanosis, right AKA, poor pulse left DP   Skin: No rashes    Neuro: Moving all four extremities, follows commands appropriately, EOM, PERRLA, grossly intact  Psych: Anxious, oriented to person, place, alert, Nml Affect  LE: No edema    Data Review:     Radiology:   XR Results (most recent):  Xray Result (most recent):  XR CHEST STANDARD TWO VW 05/08/2024    Narrative  Indication:   SOB.    Exam: AP and lateral views of the chest.    Direct comparison is made to prior CT dated 5/2023.    Findings: Cardiomediastinal silhouette is within normal limits. Lungs are clear  bilaterally. Pleural spaces are normal. Osseous structures are intact.    Impression  No acute cardiopulmonary disease.      CT Result (most recent):  CTA CHEST W WO CONTRAST 05/16/2023    Narrative  Clinical history: pre-syncope, hx of DVT, rule out PE  INDICATION:   pre-syncope, hx of DVT, rule out PE  COMPARISON: None      CONTRAST: 100 ml Isovue 370  TECHNIQUE: CT of the chest with  IV contrast , Isovue-370 is performed. Axial  images from the thoracic inlet to the level of the upper abdomen are obtained.  Manual post-processing of the images and coronal reformatting is also

## 2024-05-12 NOTE — PLAN OF CARE
Problem: Discharge Planning  Goal: Discharge to home or other facility with appropriate resources  5/12/2024 1537 by Sheila Dale RN  Outcome: Progressing     Problem: Pain  Goal: Verbalizes/displays adequate comfort level or baseline comfort level  5/12/2024 1537 by Sheila Dale RN  Outcome: Progressing     Problem: Safety - Adult  Goal: Free from fall injury  5/12/2024 1537 by Sheila Dale RN  Outcome: Progressing

## 2024-05-13 VITALS
RESPIRATION RATE: 16 BRPM | TEMPERATURE: 98.6 F | BODY MASS INDEX: 21.19 KG/M2 | WEIGHT: 151.4 LBS | OXYGEN SATURATION: 98 % | DIASTOLIC BLOOD PRESSURE: 69 MMHG | SYSTOLIC BLOOD PRESSURE: 127 MMHG | HEIGHT: 71 IN | HEART RATE: 55 BPM

## 2024-05-13 DIAGNOSIS — Z95.5 STENTED CORONARY ARTERY: Primary | ICD-10-CM

## 2024-05-13 LAB
ALBUMIN SERPL-MCNC: 2.2 G/DL (ref 3.5–5)
ALBUMIN/GLOB SERPL: 0.5 (ref 1.1–2.2)
ALP SERPL-CCNC: 92 U/L (ref 45–117)
ALT SERPL-CCNC: 39 U/L (ref 12–78)
ANION GAP SERPL CALC-SCNC: 4 MMOL/L (ref 5–15)
AST SERPL-CCNC: 26 U/L (ref 15–37)
BASOPHILS # BLD: 0 K/UL (ref 0–0.1)
BASOPHILS NFR BLD: 1 % (ref 0–1)
BILIRUB SERPL-MCNC: 0.4 MG/DL (ref 0.2–1)
BUN SERPL-MCNC: 21 MG/DL (ref 6–20)
BUN/CREAT SERPL: 29 (ref 12–20)
CALCIUM SERPL-MCNC: 9.8 MG/DL (ref 8.5–10.1)
CHLORIDE SERPL-SCNC: 107 MMOL/L (ref 97–108)
CO2 SERPL-SCNC: 27 MMOL/L (ref 21–32)
CREAT SERPL-MCNC: 0.73 MG/DL (ref 0.7–1.3)
DIFFERENTIAL METHOD BLD: ABNORMAL
EOSINOPHIL # BLD: 0.2 K/UL (ref 0–0.4)
EOSINOPHIL NFR BLD: 4 % (ref 0–7)
ERYTHROCYTE [DISTWIDTH] IN BLOOD BY AUTOMATED COUNT: 12.8 % (ref 11.5–14.5)
GLOBULIN SER CALC-MCNC: 4.3 G/DL (ref 2–4)
GLUCOSE SERPL-MCNC: 112 MG/DL (ref 65–100)
HCT VFR BLD AUTO: 30.2 % (ref 36.6–50.3)
HGB BLD-MCNC: 10 G/DL (ref 12.1–17)
IMM GRANULOCYTES # BLD AUTO: 0 K/UL (ref 0–0.04)
IMM GRANULOCYTES NFR BLD AUTO: 0 % (ref 0–0.5)
LYMPHOCYTES # BLD: 1.1 K/UL (ref 0.8–3.5)
LYMPHOCYTES NFR BLD: 21 % (ref 12–49)
MCH RBC QN AUTO: 28.6 PG (ref 26–34)
MCHC RBC AUTO-ENTMCNC: 33.1 G/DL (ref 30–36.5)
MCV RBC AUTO: 86.3 FL (ref 80–99)
MONOCYTES # BLD: 0.6 K/UL (ref 0–1)
MONOCYTES NFR BLD: 12 % (ref 5–13)
NEUTS SEG # BLD: 3.2 K/UL (ref 1.8–8)
NEUTS SEG NFR BLD: 62 % (ref 32–75)
NRBC # BLD: 0 K/UL (ref 0–0.01)
NRBC BLD-RTO: 0 PER 100 WBC
PLATELET # BLD AUTO: 425 K/UL (ref 150–400)
PMV BLD AUTO: 9.8 FL (ref 8.9–12.9)
POTASSIUM SERPL-SCNC: 4.4 MMOL/L (ref 3.5–5.1)
PROT SERPL-MCNC: 6.5 G/DL (ref 6.4–8.2)
RBC # BLD AUTO: 3.5 M/UL (ref 4.1–5.7)
SODIUM SERPL-SCNC: 138 MMOL/L (ref 136–145)
WBC # BLD AUTO: 5.1 K/UL (ref 4.1–11.1)

## 2024-05-13 PROCEDURE — 6360000002 HC RX W HCPCS: Performed by: FAMILY MEDICINE

## 2024-05-13 PROCEDURE — 6370000000 HC RX 637 (ALT 250 FOR IP): Performed by: FAMILY MEDICINE

## 2024-05-13 PROCEDURE — 6370000000 HC RX 637 (ALT 250 FOR IP): Performed by: INTERNAL MEDICINE

## 2024-05-13 PROCEDURE — 36415 COLL VENOUS BLD VENIPUNCTURE: CPT

## 2024-05-13 PROCEDURE — 94761 N-INVAS EAR/PLS OXIMETRY MLT: CPT

## 2024-05-13 PROCEDURE — 80053 COMPREHEN METABOLIC PANEL: CPT

## 2024-05-13 PROCEDURE — 97161 PT EVAL LOW COMPLEX 20 MIN: CPT

## 2024-05-13 PROCEDURE — 85025 COMPLETE CBC W/AUTO DIFF WBC: CPT

## 2024-05-13 RX ORDER — NAPROXEN 250 MG/1
250 TABLET ORAL 2 TIMES DAILY WITH MEALS
Qty: 20 TABLET | Refills: 0 | Status: ON HOLD | OUTPATIENT
Start: 2024-05-13 | End: 2024-05-17 | Stop reason: HOSPADM

## 2024-05-13 RX ADMIN — ENOXAPARIN SODIUM 40 MG: 100 INJECTION SUBCUTANEOUS at 09:21

## 2024-05-13 RX ADMIN — CLOPIDOGREL BISULFATE 75 MG: 75 TABLET ORAL at 09:21

## 2024-05-13 RX ADMIN — NAPROXEN 250 MG: 250 TABLET ORAL at 09:21

## 2024-05-13 RX ADMIN — GABAPENTIN 300 MG: 300 CAPSULE ORAL at 09:21

## 2024-05-13 RX ADMIN — ASPIRIN 81 MG: 81 TABLET, CHEWABLE ORAL at 09:21

## 2024-05-13 ASSESSMENT — PAIN DESCRIPTION - LOCATION: LOCATION: SHOULDER

## 2024-05-13 ASSESSMENT — PAIN SCALES - GENERAL
PAINLEVEL_OUTOF10: 0
PAINLEVEL_OUTOF10: 8

## 2024-05-13 ASSESSMENT — PAIN DESCRIPTION - ORIENTATION: ORIENTATION: LEFT

## 2024-05-13 NOTE — DISCHARGE SUMMARY
Hospitalist Discharge Summary     Patient ID:    Dannie James  [id number]  63 y.o.  1960    Admit date of service: 5/8/2024    Discharge date of service: 5/13/2024    Admission Diagnoses: Heart murmur [R01.1]  Symptomatic bradycardia [R00.1]  Irregular heart rhythm [I49.9]    Chronic Diagnoses:      Discharge Medications:   Current Discharge Medication List        START taking these medications    Details   naproxen (NAPROSYN) 250 MG tablet Take 1 tablet by mouth 2 times daily (with meals)  Qty: 20 tablet, Refills: 0           CONTINUE these medications which have NOT CHANGED    Details   DULoxetine (CYMBALTA) 30 MG extended release capsule Take 1 capsule by mouth daily      gabapentin (NEURONTIN) 300 MG capsule Take 1 capsule by mouth 3 times daily.      clopidogrel (PLAVIX) 75 MG tablet Take 1 tablet by mouth daily      QUEtiapine (SEROQUEL) 100 MG tablet Take 1 tablet by mouth 2 times daily      aspirin 81 MG chewable tablet Take 1 tablet by mouth daily  Qty: 30 tablet, Refills: 3      atorvastatin (LIPITOR) 40 MG tablet Take 1 tablet by mouth nightly  Qty: 30 tablet, Refills: 3             Follow up Care:    1. No follow-up provider specified. in 1-2 weeks  2. Cardiology     Diet:  cardiac diet    Disposition:  Home.    Advanced Directive:    Discharge Exam:  See today's note.    CONSULTATIONS: cardiology    Significant Diagnostic Studies:   Recent Labs     05/13/24 0421   WBC 5.1   HGB 10.0*   HCT 30.2*   *     Recent Labs     05/11/24  0143 05/11/24  1054 05/13/24 0421   * 136 138   K 2.7* 3.9 4.4   * 105 107   CO2 21 26 27   BUN 14 18 21*   MG 1.6  --   --    PHOS 2.8  --   --      Recent Labs     05/13/24 0421   ALT 39   GLOB 4.3*     No results for input(s): \"INR\", \"APTT\" in the last 72 hours.    Invalid input(s): \"PTP\"   No results for input(s): \"TIBC\", \"FERR\" in the last 72 hours.    Invalid input(s): \"FE\", \"PSAT\"   No results for input(s): \"PH\", \"PCO2\", \"PO2\" in the last 72

## 2024-05-13 NOTE — PROGRESS NOTES
[facility]                    Cardiology Consult  Note     []Initial Encounter     [x]Follow-up    Patient Name: Dannie James - :1960 - MRN:[id number]  Primary Cardiologist:Dr. Araujo  Consulting Cardiologist:      Reason for encounter: Sent by cardiologist of for shortness of breath     HPI:       Dannie James is a 63 y.o. male with PMH significant for recent TIA in 2024, HLPD, severe PAD with bilateral fem pops and right AKA  (Dr. Armando Torres) , renal stones, spinal stenosis and multiple lower ext DVTS,detached retina, sowmya hip replace, bilat shoulder repair, history polysubstance abuse (last use 9 mos ago . Last office echo was 2024 EF was 45% with LVH, mod tricuspid regurgitatation, mild /mod mitral regurg, mild aortic stenosis FRACISCO 1.8 cm2. Patient stated that he had and MI with stents about 3 yrs ago but, there is no documentation of this in [facility] or from cardiologists office. Stress test per office 2017 EF was 50%.  Holter 2024 baseline was sinus with heart rates 35-61 with PVCs and couplets.  Patient has been having worsening dyspnea on exertion x 2 weeks. Currently still smoking.    Subjective:      Dannie James reports less shortness of breath or chest pains today.  Reports continued head and neck pain, tooth pain with painful swallowing.  Denies dizziness and palpitations.     Arrhythmia - currently in sinus rhythm / sinus bradycardia with PVC, PAC ,EKG , telemetry monitoring shows some trigeminy, couplets and PVCs overnight, has holter from office, start metoprolol 25 mg bid per Dr Bhatia,  replete lyte, cardiac  cath tomorrow  HFpEF- Echo is unchanged from previous with mild to moderate AS, Trops neg x 3, start BB, diuresis today.   Hyperthyroidism TSH low < 0.01 from 1.18 2023, will need free t4 and follow up   CAD hx - ASA, statin, plavix,start BB per  start metoprolol 25 mg bid  HLPD on atorvastatin, check lipids and A1C.  TIA in April 
  Physician Progress Note      PATIENT:               KEYONNA URIBE  CSN #:                  [id number]  :                       1960  ADMIT DATE:       2024 11:29 AM  DISCH DATE:  RESPONDING  PROVIDER #:        Tucker Manning MD          QUERY TEXT:    Good Afternoon    This patient admitted on 2024- for Bradycardia.    The medical record also notes diagnosis of HFpEF.    f possible, can you please further clarify the acuity and the type of the CHF   please document in progress notes and discharge summary further specificity   regarding the type and acuity of CHF:    The medical record reflects the following:  Risk Factors: HTN, CHF, CAD, Polysubstance abuse  Clinical Indicators: Cars notes on 05/10--\"HFpEF\", Echo is unchanged from   previous with mild to moderate AS, troponins negative x3,  Treatment: Echo, IV lasix, Cardiology, ASA, Statin, Started on BB here, but   now reduced, Telemetry    Thank you  Shyanne Wong, MARYN,RN, CPHQ, CCDS, SMART  Options provided:  -- Acute on Chronic Systolic CHF/HFrEF  -- Acute on Chronic Diastolic CHF/HFpEF  -- Acute on Chronic Systolic and Diastolic CHF  -- Acute Systolic CHF/HFrEF  -- Acute Diastolic CHF/HFpEF  -- Acute Systolic and Diastolic CHF  -- Chronic Systolic CHF/HFrEF  -- Chronic Diastolic CHF/HFpEF  -- Chronic Systolic and Diastolic CHF  -- Other - I will add my own diagnosis  -- Disagree - Not applicable / Not valid  -- Disagree - Clinically unable to determine / Unknown  -- Refer to Clinical Documentation Reviewer    PROVIDER RESPONSE TEXT:    This patient has chronic diastolic CHF/HFpEF.    Query created by: Shyanne Wong on 5/10/2024 11:19 AM      Electronically signed by:  Tucker Manning MD 5/10/2024 11:56 AM          
0700: Bedside and Verbal shift change report given to Gustavo RN (oncoming nurse) by Toño RN (offgoing nurse). Report included the following information Nurse Handoff Report, Adult Overview, Intake/Output, MAR, Recent Results, and Cardiac Rhythm NSR/SBrady .       1650: Pt returned to unit from cath lab. Aaliyah RUTLEDGE instructed on when     1755: TR band removal begun. 13mL-2mL= 11mL    1800: 11mL-3mL= 8mL    1805: 8mL-3mL= 5mL    1810: 5mL-3mL=2mL.     1815: 2mL-2mL=0mL. Quik-clot and transparent dressing applied. Pt resting comfortably. No signs of hematoma or bleeding. Pt instructed not to put any pressure on R arm/wrist to prevent bleeding.    1900: Bedside and Verbal shift change report given to Kareen RUTLEDGE (oncoming nurse) by Gustavo RN (offgoing nurse). Report included the following information Nurse Handoff Report, Adult Overview, Intake/Output, MAR, Recent Results, and Cardiac Rhythm NSR .     
3:51 PM  TRANSFER - IN REPORT:    Verbal report received from RN on Dannie James  being received from cath for routine post-op      Report consisted of patient's Situation, Background, Assessment and   Recommendations(SBAR).     Information from the following report(s) Nurse Handoff Report was reviewed with the receiving nurse.    Opportunity for questions and clarification was provided.      Assessment completed upon patient's arrival to unit and care assumed.     4:45 PM  Report given to MATY Chen. Site check performed with MATY Chen. Site presents as clean dry intact and soft to palpitation.  
Chart accessed in anticipation of assuming care of Pt. This RN no longer primary RN of Pt d/t assignment change.  
Events noted. Await results of thyroid ultrasound.    ESR/CRP elevated as expected. Would treat with NSAID's as first line therapy for symptoms. Steroids second line.    Presumptive Diagnosis: Subacute Thyroiditis  
[facility]  [address] [place] [address] [phone]      Hospitalist Progress Note      NAME: Dannie James   :  1960  MRM:  [id number]    Date of service: 2024  2:11 PM       Assessment and Plan:   Dannie James is a 63 y.o. male who  was sent in from his cardiology office for abnormal heart rhythm.  He was noted to go from sinus rhythm to sinus bradycardia with PVCs and A-fib.     #Bradycardia/irregular HR. Now better. Has event monitor with him which was ordered by cardiologist. Echocardiogram with normal EF and wall motion. Went for LHC yesterday, and had PCI to Lcx. Given his CAD and ongoing bradycardia, may need PPM next week. For now, will hold metop. EP recs appreciated    #CAD: As above, PCI to Lcx, may need staged PCI of prox LAD   - DAPT, atorva; hold metop    #Subacute Thyroiditis: Initially had neck pain and abnl thyroid studies suggestive of thyroiditis. However, today he has no pain so will hold on scheduled NSAIDs. Will start if sx return. Appreciate Endocrine recs    #Hypokalemia.  Replete     #Depression. Continue Cymbalta Seroquel     #Neuropathy. Continue gabapentin             Subjective:     Chief Complaint:: Patient was seen and examined as a follow up for irregular heart rate.  Chart was reviewed.  Feels better, just weak     ROS:  (bold if positive, if negative)    Tolerating PT  Tolerating Diet        Objective:     Last 24hrs VS reviewed since prior progress note. Most recent are:    Vitals:    24 1247   BP: 111/69   Pulse: (!) 46   Resp: 16   Temp: 97.7 °F (36.5 °C)   SpO2: 96%     SpO2 Readings from Last 6 Encounters:   24 96%   23 97%          Intake/Output Summary (Last 24 hours) at 2024 1411  Last data filed at 2024 1150  Gross per 24 hour   Intake 525.7 ml   Output 830 ml   Net -304.3 ml          Physical Exam:    Gen:  Well-developed, well-nourished, in no acute distress  HEENT:  Pink 
[facility]  [address] [place] [address] [phone]      Hospitalist Progress Note      NAME: Dannie James   :  1960  MRM:  [id number]    Date of service: 2024  12:18 PM       Assessment and Plan:   Dannie James is a 63 y.o. male who  was sent in from his cardiology office for abnormal heart rhythm.  He was noted to go from sinus rhythm to sinus bradycardia with PVCs and A-fib.     #Bradycardia/irregular HR. Now better. Has event monitor with him which was ordered by cardiologist. Echocardiogram with normal EF and wall motion. Went for [facility] and had PCI to Lcx. Given his CAD and ongoing bradycardia there was concern he may need PPM. However, bradycardia has resolved with cessation of metop   - Cont tele one more night, hold metop; EP recs appreciated    #CAD: As above, PCI to Lcx, may need staged PCI of prox LAD   - DAPT, atorva; hold metop    #Subacute Thyroiditis: Notes worsening neck pain today so will go ahead and start naproxen. Appreciate Endocrine recs.     #Hypokalemia.  Replete     #Depression. Continue Cymbalta Seroquel. He is very tearful today and anxious about returning to his current living situation. Has hx polysubstance abuse and has been clean for 9 months. That environment would surround him again. He asks for psych consult and I will also have CM assist with resources     #Neuropathy. Continue gabapentin             Subjective:     No acute events overnight. He feels ok, but very sad and anxious about returning \"to that [place]\" of his former residence.     ROS:  (bold if positive, if negative)    Tolerating PT  Tolerating Diet        Objective:     Last 24hrs VS reviewed since prior progress note. Most recent are:    Vitals:    24 1124   BP: 113/71   Pulse: 63   Resp: 16   Temp: 99.1 °F (37.3 °C)   SpO2:      SpO2 Readings from Last 6 Encounters:   24 96%   23 97%          Intake/Output Summary (Last 24 hours) at 
[facility] [place]  [address] [place] [address] [phone]      Hospitalist Progress Note      NAME: Dannie James   :  1960  MRM:  [id number]    Date of service: 5/10/2024  12:49 PM       Assessment and Plan:   Bradycardia/irregular HR. Now better. Has event monitor with him which was ordered by cardiologist. Echocardiogram with normal EF and wall motion, plan for cardiac cath 5/10. Evaluation by cardiology appreciated     2.  Hypokalemia.  Replete    3.  CAD. Continue aspirin, Plavix, statin     4.  Depression. Continue Cymbalta Seroquel     5.  Neuropathy. Continue gabapentin    6.  Sore throat/neck symptoms/ low grade fever. RVP is negative. Rapid strep is negative. Monitor     7.  Low TSH. Elevated T4. Checking US of the thyroid. Dr Andrews following          Subjective:     Chief Complaint:: Patient was seen and examined as a follow up for irregular heart rate.  Chart was reviewed.  C/O sore throat      ROS:  (bold if positive, if negative)    Tolerating PT  Tolerating Diet        Objective:     Last 24hrs VS reviewed since prior progress note. Most recent are:    Vitals:    05/10/24 1154   BP: 129/63   Pulse: 57   Resp: 16   Temp: 97.5 °F (36.4 °C)   SpO2: 98%     SpO2 Readings from Last 6 Encounters:   05/10/24 98%   23 97%          Intake/Output Summary (Last 24 hours) at 5/10/2024 1249  Last data filed at 5/10/2024 1156  Gross per 24 hour   Intake --   Output 1350 ml   Net -1350 ml          Physical Exam:    Gen:  Well-developed, well-nourished, in no acute distress  HEENT:  Pink conjunctivae, PERRL, hearing intact to voice, moist mucous membranes  Neck:  Supple, without masses, thyroid non-tender  Resp:  No accessory muscle use, clear breath sounds without wheezes rales or rhonchi  Card:  No murmurs, normal S1, S2 without thrills, bruits or peripheral edema  Abd:  Soft, non-tender, non-distended, normoactive bowel sounds are present, no palpable 
[facility] [place]  [address] [place] [address] [phone]      Hospitalist Progress Note      NAME: Dannie James   :  1960  MRM:  [id number]    Date of service: 2024  10:47 AM       Assessment and Plan:   Bradycardia/irregular HR. Now better. Has event monitor with him which was ordered by cardiologist.  Check echocardiogram.  Consult cardiology    2.  Hypokalemia.  Replete    3.  CAD. Continue aspirin, Plavix, statin     4.  Depression. Continue Cymbalta Seroquel     5.  Neuropathy. Continue gabapentin    6.  Sore throat/neck symptoms.  Check RVP    7.  Low TSH. Check T4.          Subjective:     Chief Complaint:: Patient was seen and examined as a follow up for irregular heart rate.  Chart was reviewed.  C/O sore throat and flulike symptoms    ROS:  (bold if positive, if negative)    Tolerating PT  Tolerating Diet        Objective:     Last 24hrs VS reviewed since prior progress note. Most recent are:    Vitals:    24 0921   BP: 113/71   Pulse: 73   Resp:    Temp:    SpO2:      SpO2 Readings from Last 6 Encounters:   24 94%   23 97%          Intake/Output Summary (Last 24 hours) at 2024 1047  Last data filed at 2024 0917  Gross per 24 hour   Intake 410 ml   Output 750 ml   Net -340 ml        Physical Exam:    Gen:  Well-developed, well-nourished, in no acute distress  HEENT:  Pink conjunctivae, PERRL, hearing intact to voice, moist mucous membranes  Neck:  Supple, without masses, thyroid non-tender  Resp:  No accessory muscle use, clear breath sounds without wheezes rales or rhonchi  Card:  No murmurs, normal S1, S2 without thrills, bruits or peripheral edema  Abd:  Soft, non-tender, non-distended, normoactive bowel sounds are present, no palpable organomegaly and no detectable hernias  Lymph:  No cervical or inguinal adenopathy  Musc:  No cyanosis or clubbing  Skin:  No rashes or ulcers, skin turgor is good  Neuro:  Cranial nerves 
[facility]  [address], [place] [address] [phone]      Hospitalist Progress Note      NAME: Dannie James   :  1960  MRM:  [id number]    Date of service: 2024  11:14 AM       Assessment and Plan:   Bradycardia/irregular HR. Now better. Has event monitor with him which was ordered by cardiologist. Echocardiogram with normal EF and wall motion. Had LHC and had PCI to Lcx. Evaluation by cardiology appreciated. Outpt FU. Cont to hold BB     2.  Hypokalemia.  Replete    3.  CAD. Continue aspirin, Plavix, statin     4.  Depression. Continue Cymbalta Seroquel     5.  Neuropathy. Continue gabapentin    6.  Sore throat/neck symptoms/ low grade fever. RVP is negative. Rapid strep is negative. Monitor     7.  Low TSH. Elevated T4/ subacute thyroiditis. US of the thyroid: Diffusely enlarged heterogeneous thyroid gland without a discrete nodule  identified.Evaluated by Dr Andrews, endocrinology. FU as outpt              Subjective:     Chief Complaint:: Patient was seen and examined as a follow up for irregular heart rate.  Chart was reviewed.  c/o fatigue    ROS:  (bold if positive, if negative)    Tolerating PT  Tolerating Diet        Objective:     Last 24hrs VS reviewed since prior progress note. Most recent are:    Vitals:    24 0817   BP: 108/79   Pulse: 87   Resp: 16   Temp: 97.9 °F (36.6 °C)   SpO2: 97%     SpO2 Readings from Last 6 Encounters:   24 97%   23 97%          Intake/Output Summary (Last 24 hours) at 2024 1114  Last data filed at 2024 0447  Gross per 24 hour   Intake 350 ml   Output --   Net 350 ml          Physical Exam:    Gen:  Well-developed, well-nourished, in no acute distress  HEENT:  Pink conjunctivae, PERRL, hearing intact to voice, moist mucous membranes  Neck:  Supple, without masses, thyroid non-tender  Resp:  No accessory muscle use, clear breath sounds without wheezes rales or rhonchi  Card:  No murmurs, 
[facility]   Cardiology Hospital Care Note                      [ x]Established visit     Patient Name: Dannie James - :1960 - MRN:[id number]   Primary Cardiologist: Florentin Araujo MD   Electrophysiologist: Donald Tamez MD     Reason for visit: PVCs     HPI:   Mr. James is a 63 y.o. male who is seen for evaluation/management of PVCs as requested by Dr Jannette Bhatia is concerned about frequent PVCs causing cardiomyopathy    Left heart cath late yesterday and resulted in need of PCI  24    CARDIAC PROCEDURE 05/10/2024  7:31 PM (Needs Review)  This result has not been signed. Information might be incomplete.    Conclusion    Severe stenosis of proximal lcx with severe ISR of distal lcx/l-pda stents.    Proximal lad stenosis with iFR 0.82    Normal lvedp    Findings:  L Main:large caliber, mild distal left main disease  LAD:large caliber, ostial lad 50-60% stenosis, mid-vessel 60-70% stenosis (iFR 0.82 with ischemia across proximal lad), small diagonals with diffuse mild to moderate disease  LCx: large caliber, dominant, proximal lcx with tubular 90% stenosis with involvement of om ostium (1,1,1), moderate om with ~60-70% stenopsis, distal Lcx into l-PDA with long stent, mild isr within distal lcx with severe >80-90% fibrotic ISR within proximal l-pda  RCA: non-dominant    LVEDP:  <Ma10 mmhg    PCI:    Ebu 3.5 6F guide    Gonsalo blue into distal lcx.    Dilated l-PDA ISR with 3.0 NC and 3.0 scoreflex balloon at 22 marin.  Residual severe waste on balloon.  Ivus shows severe fibrotic stenosis      Proximal lcx dilated with 3.0 nc balloon.  Wired om1.  Proximal lcx stented with 3.0x18 leighton andrews, post-dilated with 3.0 nc balloon. Timoteo 3 flow into om without residual severe stenosis, deferred further intervention of OM.      Timoteo 3 flow pre and post      Plan:  Elected to persue Lcx pci due to inferolateral wall motion abnormalities noted on echocardiogram  Consider staged pci of proximal lad  Long 
PHYSICAL THERAPY EVALUATION/DISCHARGE    Patient: Dannie James (63 y.o. male)  Date: 5/13/2024  Primary Diagnosis: Heart murmur [R01.1]  Symptomatic bradycardia [R00.1]  Irregular heart rhythm [I49.9]  Procedure(s) (LRB):  Left heart cath / coronary angiography (N/A) 3 Days Post-Op   Precautions:                        ASSESSMENT AND RECOMMENDATIONS:  Based on the objective data below, the patient presents near his baseline level of modified independent following admission for symptomatic bradycardia. Pt with history of R AKA and has a prosthesis at home which he has not been able to wear for a few weeks. C/o L shoulder pain, also baseline. Overall mobility at a SUP level, pt moving quickly and requesting therapist to not hold at the gait belt. Navigates room with bilateral axillary crutches. Reports difficulty getting to meetings in the community d/t decreased endurance. He would benefit from HHPT as well as a wheelchair for home and community distances as he is not yet comfortable with new prosthesis.     Functional Outcome Measure:  The patient scored 23/24 on the [facility] outcome measure.          Further skilled acute physical therapy is not indicated at this time.       PLAN :  Recommendation for discharge: (in order for the patient to meet his/her long term goals): Therapy 2x a week in the home    Other factors to consider for discharge: no support system and high risk for falls    IF patient discharges home will need the following DME: wheelchair 18 inch       SUBJECTIVE:   Patient stated “They want me to go to meetings for times a week but I can't get to most of them it is too hard on me.”    OBJECTIVE DATA SUMMARY:     Past Medical History:   Diagnosis Date    Hypercholesterolemia     MI (myocardial infarction) (HCC)     Spinal stenosis      Past Surgical History:   Procedure Laterality Date    ABOVE KNEE AMPUTATION      CARDIAC PROCEDURE N/A 5/10/2024    Left heart cath / coronary angiography performed by 
pulmonary embolism to this level.  CORONARY VASCULAR CALCIFICATIONS:  Present  There is no aortic aneurysm or dissection.    There is mild cardiomegaly. There is hepatic steatosis. Dependent atelectasis.  Centrilobular emphysema. There is no pleural or pericardial effusion. There is  no mediastinal, axillary or hilar lymphadenopathy. The aorta is normal in course  and caliber. The proximal pulmonary arteries are without evidence of filling  defects. No lytic or blastic lesions are identified. The remainder of the upper  abdomen visualized is unremarkable.    Impression  There is no pulmonary embolism.  There is no aortic aneurysm or dissection.  Coronary artery disease and minimal cardiomegaly.    Mild centrilobular emphysematous change.    Incidental findings are as described above.      Recent Labs     05/09/24  0610 05/08/24  1145   WBC 6.6 5.9   HGB 9.9* 11.1*   HCT 29.3* 33.2*   MCV 85.9 86.0    392      Lab Results   Component Value Date     (L) 05/09/2024    K 3.3 (L) 05/09/2024     05/09/2024    CO2 23 05/09/2024    BUN 15 05/09/2024    CREATININE 0.82 05/09/2024    GLUCOSE 115 (H) 05/09/2024    CALCIUM 8.3 (L) 05/09/2024    BILITOT 0.6 05/08/2024    ALKPHOS 106 05/08/2024    AST 18 05/08/2024    ALT 29 05/08/2024    LABGLOM >90 05/09/2024    GLOB 4.9 (H) 05/08/2024           Recent Labs     05/09/24  1115   CHOL 110       Current meds:    Current Facility-Administered Medications:     metoprolol tartrate (LOPRESSOR) tablet 25 mg, 25 mg, Oral, BID, Ronnie Bhatia DO, 25 mg at 05/10/24 0807    sodium chloride flush 0.9 % injection 5-40 mL, 5-40 mL, IntraVENous, 2 times per day, Yuko Soto MD, 10 mL at 05/10/24 0807    sodium chloride flush 0.9 % injection 5-40 mL, 5-40 mL, IntraVENous, PRN, Yuko Soto MD    0.9 % sodium chloride infusion, , IntraVENous, PRNCharles Maitri S, MD    potassium chloride (KLOR-CON) extended release tablet 40 mEq, 40 mEq, Oral, PRN **OR** 
5-40 mL IntraVENous PRN    0.9 % sodium chloride infusion   IntraVENous PRN    potassium chloride (KLOR-CON) extended release tablet 40 mEq  40 mEq Oral PRN    Or    potassium bicarb-citric acid (EFFER-K) effervescent tablet 40 mEq  40 mEq Oral PRN    Or    potassium chloride 10 mEq/100 mL IVPB (Peripheral Line)  10 mEq IntraVENous PRN    magnesium sulfate 2000 mg in 50 mL IVPB premix  2,000 mg IntraVENous PRN    enoxaparin (LOVENOX) injection 40 mg  40 mg SubCUTAneous Daily    ondansetron (ZOFRAN-ODT) disintegrating tablet 4 mg  4 mg Oral Q8H PRN    Or    ondansetron (ZOFRAN) injection 4 mg  4 mg IntraVENous Q6H PRN    polyethylene glycol (GLYCOLAX) packet 17 g  17 g Oral Daily PRN    acetaminophen (TYLENOL) tablet 650 mg  650 mg Oral Q6H PRN    Or    acetaminophen (TYLENOL) suppository 650 mg  650 mg Rectal Q6H PRN    aspirin chewable tablet 81 mg  81 mg Oral Daily    atorvastatin (LIPITOR) tablet 40 mg  40 mg Oral Nightly    clopidogrel (PLAVIX) tablet 75 mg  75 mg Oral Daily    gabapentin (NEURONTIN) capsule 300 mg  300 mg Oral Daily    QUEtiapine (SEROQUEL) tablet 100 mg  100 mg Oral Nightly         Donald Tamez M.D.   Electrophysiology/Cardiology   [facility] [place]   [address]                      [address] [address]   [place] [address]                             [facility] [address] [phone]        CC:Lashell Shirley MD

## 2024-05-13 NOTE — CARE COORDINATION
5/13/2024   CARE MANAGEMENT NOTE:  Pt transferred from [facility] to the 5th floor.  EMR reviewed and handoff received from previous  (Destiney REZA).  Pt was admitted with bradycardia.  Also with right AKA.  Reportedly, pt resides at the [facility] for sober living.    RUR 12%; LOS 4 days    Transition Plan of Care:  Cardiology, Endocrinology is following for medical management  Plan is for pt to return to the [facility] located at [address]  CM notified [facility] ([phone]) of pt's return there today.    Pt was evaluated by Psych NP and BSMART  Outpatient follow up  Roundtrip cab was arranged for 4:30 p.m.    No further post discharge needs indicated.  Terri

## 2024-05-13 NOTE — PLAN OF CARE
Problem: Discharge Planning  Goal: Discharge to home or other facility with appropriate resources  5/12/2024 2012 by Sintia Pollard RN  Outcome: Progressing  5/12/2024 1537 by Sheila Dale RN  Outcome: Progressing     Problem: Pain  Goal: Verbalizes/displays adequate comfort level or baseline comfort level  5/12/2024 2012 by Sintia Pollard RN  Outcome: Progressing  5/12/2024 1537 by Sheila Dale RN  Outcome: Progressing     Problem: Safety - Adult  Goal: Free from fall injury  5/12/2024 2012 by Sintia Pollard RN  Outcome: Progressing  5/12/2024 1537 by Sheila Dale RN  Outcome: Progressing

## 2024-05-15 ENCOUNTER — APPOINTMENT (OUTPATIENT)
Facility: HOSPITAL | Age: 64
DRG: 281 | End: 2024-05-15
Payer: MEDICARE

## 2024-05-15 ENCOUNTER — HOSPITAL ENCOUNTER (INPATIENT)
Facility: HOSPITAL | Age: 64
LOS: 1 days | Discharge: HOME | DRG: 281 | End: 2024-05-17
Attending: EMERGENCY MEDICINE | Admitting: INTERNAL MEDICINE
Payer: MEDICARE

## 2024-05-15 DIAGNOSIS — R79.89 ELEVATED TROPONIN: ICD-10-CM

## 2024-05-15 DIAGNOSIS — I24.9 ACUTE CORONARY SYNDROME (HCC): ICD-10-CM

## 2024-05-15 DIAGNOSIS — R07.9 CHEST PAIN: ICD-10-CM

## 2024-05-15 DIAGNOSIS — R07.89 CHEST PRESSURE: Primary | ICD-10-CM

## 2024-05-15 DIAGNOSIS — G62.9 NEUROPATHY: ICD-10-CM

## 2024-05-15 PROBLEM — I21.4 NSTEMI (NON-ST ELEVATED MYOCARDIAL INFARCTION) (HCC): Status: ACTIVE | Noted: 2024-05-15

## 2024-05-15 LAB
ALBUMIN SERPL-MCNC: 2.5 G/DL (ref 3.5–5)
ALBUMIN/GLOB SERPL: 0.5 (ref 1.1–2.2)
ALP SERPL-CCNC: 109 U/L (ref 45–117)
ALT SERPL-CCNC: 46 U/L (ref 12–78)
ANION GAP SERPL CALC-SCNC: 4 MMOL/L (ref 5–15)
AST SERPL-CCNC: 28 U/L (ref 15–37)
BASOPHILS # BLD: 0 K/UL (ref 0–0.1)
BASOPHILS NFR BLD: 1 % (ref 0–1)
BILIRUB SERPL-MCNC: 0.3 MG/DL (ref 0.2–1)
BUN SERPL-MCNC: 22 MG/DL (ref 6–20)
BUN/CREAT SERPL: 27 (ref 12–20)
CALCIUM SERPL-MCNC: 9.4 MG/DL (ref 8.5–10.1)
CHLORIDE SERPL-SCNC: 103 MMOL/L (ref 97–108)
CO2 SERPL-SCNC: 28 MMOL/L (ref 21–32)
COMMENT:: NORMAL
CREAT SERPL-MCNC: 0.83 MG/DL (ref 0.7–1.3)
DIFFERENTIAL METHOD BLD: ABNORMAL
ECHO BSA: 1.94 M2
EKG ATRIAL RATE: 83 BPM
EKG DIAGNOSIS: NORMAL
EKG P AXIS: 70 DEGREES
EKG P-R INTERVAL: 140 MS
EKG Q-T INTERVAL: 392 MS
EKG QRS DURATION: 90 MS
EKG QTC CALCULATION (BAZETT): 460 MS
EKG R AXIS: -12 DEGREES
EKG T AXIS: 56 DEGREES
EKG VENTRICULAR RATE: 83 BPM
EOSINOPHIL # BLD: 0.2 K/UL (ref 0–0.4)
EOSINOPHIL NFR BLD: 3 % (ref 0–7)
ERYTHROCYTE [DISTWIDTH] IN BLOOD BY AUTOMATED COUNT: 12.8 % (ref 11.5–14.5)
GLOBULIN SER CALC-MCNC: 4.6 G/DL (ref 2–4)
GLUCOSE SERPL-MCNC: 125 MG/DL (ref 65–100)
HCT VFR BLD AUTO: 31.6 % (ref 36.6–50.3)
HGB BLD-MCNC: 10.4 G/DL (ref 12.1–17)
IMM GRANULOCYTES # BLD AUTO: 0 K/UL (ref 0–0.04)
IMM GRANULOCYTES NFR BLD AUTO: 1 % (ref 0–0.5)
INR PPP: 1.1 (ref 0.9–1.1)
LYMPHOCYTES # BLD: 1.2 K/UL (ref 0.8–3.5)
LYMPHOCYTES NFR BLD: 26 % (ref 12–49)
MCH RBC QN AUTO: 28.3 PG (ref 26–34)
MCHC RBC AUTO-ENTMCNC: 32.9 G/DL (ref 30–36.5)
MCV RBC AUTO: 85.9 FL (ref 80–99)
MONOCYTES # BLD: 0.6 K/UL (ref 0–1)
MONOCYTES NFR BLD: 14 % (ref 5–13)
NEUTS SEG # BLD: 2.5 K/UL (ref 1.8–8)
NEUTS SEG NFR BLD: 55 % (ref 32–75)
NRBC # BLD: 0 K/UL (ref 0–0.01)
NRBC BLD-RTO: 0 PER 100 WBC
NT PRO BNP: 2591 PG/ML
PLATELET # BLD AUTO: 464 K/UL (ref 150–400)
PMV BLD AUTO: 9.8 FL (ref 8.9–12.9)
POTASSIUM SERPL-SCNC: 3.9 MMOL/L (ref 3.5–5.1)
PROT SERPL-MCNC: 7.1 G/DL (ref 6.4–8.2)
PROTHROMBIN TIME: 11.6 SEC (ref 9–11.1)
RBC # BLD AUTO: 3.68 M/UL (ref 4.1–5.7)
SODIUM SERPL-SCNC: 135 MMOL/L (ref 136–145)
SPECIMEN HOLD: NORMAL
TROPONIN I SERPL HS-MCNC: 1365 NG/L (ref 0–76)
TROPONIN I SERPL HS-MCNC: 744 NG/L (ref 0–76)
TROPONIN I SERPL HS-MCNC: 938 NG/L (ref 0–76)
TSH SERPL DL<=0.05 MIU/L-ACNC: <0.01 UIU/ML (ref 0.36–3.74)
TSI ACT/NOR SER: <0.1 IU/L (ref 0–0.55)
WBC # BLD AUTO: 4.4 K/UL (ref 4.1–11.1)

## 2024-05-15 PROCEDURE — 99223 1ST HOSP IP/OBS HIGH 75: CPT | Performed by: SPECIALIST

## 2024-05-15 PROCEDURE — 92978 ENDOLUMINL IVUS OCT C 1ST: CPT | Performed by: STUDENT IN AN ORGANIZED HEALTH CARE EDUCATION/TRAINING PROGRAM

## 2024-05-15 PROCEDURE — 6360000002 HC RX W HCPCS: Performed by: STUDENT IN AN ORGANIZED HEALTH CARE EDUCATION/TRAINING PROGRAM

## 2024-05-15 PROCEDURE — 84484 ASSAY OF TROPONIN QUANT: CPT

## 2024-05-15 PROCEDURE — 36415 COLL VENOUS BLD VENIPUNCTURE: CPT

## 2024-05-15 PROCEDURE — 94761 N-INVAS EAR/PLS OXIMETRY MLT: CPT

## 2024-05-15 PROCEDURE — 6370000000 HC RX 637 (ALT 250 FOR IP): Performed by: STUDENT IN AN ORGANIZED HEALTH CARE EDUCATION/TRAINING PROGRAM

## 2024-05-15 PROCEDURE — 93454 CORONARY ARTERY ANGIO S&I: CPT | Performed by: STUDENT IN AN ORGANIZED HEALTH CARE EDUCATION/TRAINING PROGRAM

## 2024-05-15 PROCEDURE — G0378 HOSPITAL OBSERVATION PER HR: HCPCS

## 2024-05-15 PROCEDURE — 71045 X-RAY EXAM CHEST 1 VIEW: CPT

## 2024-05-15 PROCEDURE — C1769 GUIDE WIRE: HCPCS | Performed by: STUDENT IN AN ORGANIZED HEALTH CARE EDUCATION/TRAINING PROGRAM

## 2024-05-15 PROCEDURE — 84443 ASSAY THYROID STIM HORMONE: CPT

## 2024-05-15 PROCEDURE — 2580000003 HC RX 258: Performed by: STUDENT IN AN ORGANIZED HEALTH CARE EDUCATION/TRAINING PROGRAM

## 2024-05-15 PROCEDURE — B241ZZ3 ULTRASONOGRAPHY OF MULTIPLE CORONARY ARTERIES, INTRAVASCULAR: ICD-10-PCS | Performed by: STUDENT IN AN ORGANIZED HEALTH CARE EDUCATION/TRAINING PROGRAM

## 2024-05-15 PROCEDURE — 2709999900 HC NON-CHARGEABLE SUPPLY: Performed by: STUDENT IN AN ORGANIZED HEALTH CARE EDUCATION/TRAINING PROGRAM

## 2024-05-15 PROCEDURE — 99153 MOD SED SAME PHYS/QHP EA: CPT | Performed by: STUDENT IN AN ORGANIZED HEALTH CARE EDUCATION/TRAINING PROGRAM

## 2024-05-15 PROCEDURE — 85610 PROTHROMBIN TIME: CPT

## 2024-05-15 PROCEDURE — 2500000003 HC RX 250 WO HCPCS: Performed by: STUDENT IN AN ORGANIZED HEALTH CARE EDUCATION/TRAINING PROGRAM

## 2024-05-15 PROCEDURE — C1887 CATHETER, GUIDING: HCPCS | Performed by: STUDENT IN AN ORGANIZED HEALTH CARE EDUCATION/TRAINING PROGRAM

## 2024-05-15 PROCEDURE — 93005 ELECTROCARDIOGRAM TRACING: CPT

## 2024-05-15 PROCEDURE — 92979 ENDOLUMINL IVUS OCT C EA: CPT | Performed by: STUDENT IN AN ORGANIZED HEALTH CARE EDUCATION/TRAINING PROGRAM

## 2024-05-15 PROCEDURE — 6360000002 HC RX W HCPCS: Performed by: EMERGENCY MEDICINE

## 2024-05-15 PROCEDURE — 99152 MOD SED SAME PHYS/QHP 5/>YRS: CPT | Performed by: STUDENT IN AN ORGANIZED HEALTH CARE EDUCATION/TRAINING PROGRAM

## 2024-05-15 PROCEDURE — 6360000004 HC RX CONTRAST MEDICATION: Performed by: STUDENT IN AN ORGANIZED HEALTH CARE EDUCATION/TRAINING PROGRAM

## 2024-05-15 PROCEDURE — 96372 THER/PROPH/DIAG INJ SC/IM: CPT

## 2024-05-15 PROCEDURE — 93005 ELECTROCARDIOGRAM TRACING: CPT | Performed by: EMERGENCY MEDICINE

## 2024-05-15 PROCEDURE — 99285 EMERGENCY DEPT VISIT HI MDM: CPT

## 2024-05-15 PROCEDURE — 80053 COMPREHEN METABOLIC PANEL: CPT

## 2024-05-15 PROCEDURE — 83880 ASSAY OF NATRIURETIC PEPTIDE: CPT

## 2024-05-15 PROCEDURE — C1894 INTRO/SHEATH, NON-LASER: HCPCS | Performed by: STUDENT IN AN ORGANIZED HEALTH CARE EDUCATION/TRAINING PROGRAM

## 2024-05-15 PROCEDURE — 85025 COMPLETE CBC W/AUTO DIFF WBC: CPT

## 2024-05-15 PROCEDURE — C1753 CATH, INTRAVAS ULTRASOUND: HCPCS | Performed by: STUDENT IN AN ORGANIZED HEALTH CARE EDUCATION/TRAINING PROGRAM

## 2024-05-15 RX ORDER — CLOPIDOGREL BISULFATE 75 MG/1
75 TABLET ORAL DAILY
Status: DISCONTINUED | OUTPATIENT
Start: 2024-05-15 | End: 2024-05-17 | Stop reason: HOSPADM

## 2024-05-15 RX ORDER — POLYETHYLENE GLYCOL 3350 17 G/17G
17 POWDER, FOR SOLUTION ORAL DAILY PRN
Status: DISCONTINUED | OUTPATIENT
Start: 2024-05-15 | End: 2024-05-17 | Stop reason: HOSPADM

## 2024-05-15 RX ORDER — VERAPAMIL HYDROCHLORIDE 2.5 MG/ML
INJECTION, SOLUTION INTRAVENOUS PRN
Status: DISCONTINUED | OUTPATIENT
Start: 2024-05-15 | End: 2024-05-15 | Stop reason: HOSPADM

## 2024-05-15 RX ORDER — ACETAMINOPHEN 650 MG/1
650 SUPPOSITORY RECTAL EVERY 6 HOURS PRN
Status: DISCONTINUED | OUTPATIENT
Start: 2024-05-15 | End: 2024-05-17 | Stop reason: HOSPADM

## 2024-05-15 RX ORDER — ONDANSETRON 4 MG/1
4 TABLET, ORALLY DISINTEGRATING ORAL EVERY 8 HOURS PRN
Status: DISCONTINUED | OUTPATIENT
Start: 2024-05-15 | End: 2024-05-17 | Stop reason: HOSPADM

## 2024-05-15 RX ORDER — ACETAMINOPHEN 325 MG/1
650 TABLET ORAL EVERY 6 HOURS PRN
Status: DISCONTINUED | OUTPATIENT
Start: 2024-05-15 | End: 2024-05-17 | Stop reason: HOSPADM

## 2024-05-15 RX ORDER — DIPHENHYDRAMINE HYDROCHLORIDE 50 MG/ML
INJECTION INTRAMUSCULAR; INTRAVENOUS PRN
Status: DISCONTINUED | OUTPATIENT
Start: 2024-05-15 | End: 2024-05-15 | Stop reason: HOSPADM

## 2024-05-15 RX ORDER — POTASSIUM CHLORIDE 750 MG/1
40 TABLET, FILM COATED, EXTENDED RELEASE ORAL PRN
Status: DISCONTINUED | OUTPATIENT
Start: 2024-05-15 | End: 2024-05-16

## 2024-05-15 RX ORDER — SODIUM CHLORIDE 0.9 % (FLUSH) 0.9 %
5-40 SYRINGE (ML) INJECTION EVERY 12 HOURS SCHEDULED
Status: DISCONTINUED | OUTPATIENT
Start: 2024-05-15 | End: 2024-05-17 | Stop reason: HOSPADM

## 2024-05-15 RX ORDER — LIDOCAINE HYDROCHLORIDE 10 MG/ML
INJECTION, SOLUTION INFILTRATION; PERINEURAL PRN
Status: DISCONTINUED | OUTPATIENT
Start: 2024-05-15 | End: 2024-05-15 | Stop reason: HOSPADM

## 2024-05-15 RX ORDER — ATORVASTATIN CALCIUM 20 MG/1
40 TABLET, FILM COATED ORAL NIGHTLY
Status: DISCONTINUED | OUTPATIENT
Start: 2024-05-15 | End: 2024-05-17 | Stop reason: HOSPADM

## 2024-05-15 RX ORDER — HEPARIN SODIUM 200 [USP'U]/100ML
INJECTION, SOLUTION INTRAVENOUS PRN
Status: DISCONTINUED | OUTPATIENT
Start: 2024-05-15 | End: 2024-05-15 | Stop reason: HOSPADM

## 2024-05-15 RX ORDER — ENOXAPARIN SODIUM 100 MG/ML
1 INJECTION SUBCUTANEOUS ONCE
Status: COMPLETED | OUTPATIENT
Start: 2024-05-15 | End: 2024-05-15

## 2024-05-15 RX ORDER — SODIUM CHLORIDE 9 MG/ML
INJECTION, SOLUTION INTRAVENOUS PRN
Status: DISCONTINUED | OUTPATIENT
Start: 2024-05-15 | End: 2024-05-17 | Stop reason: HOSPADM

## 2024-05-15 RX ORDER — MAGNESIUM SULFATE IN WATER 40 MG/ML
2000 INJECTION, SOLUTION INTRAVENOUS PRN
Status: DISCONTINUED | OUTPATIENT
Start: 2024-05-15 | End: 2024-05-16

## 2024-05-15 RX ORDER — MIDAZOLAM HYDROCHLORIDE 1 MG/ML
INJECTION INTRAMUSCULAR; INTRAVENOUS PRN
Status: DISCONTINUED | OUTPATIENT
Start: 2024-05-15 | End: 2024-05-15 | Stop reason: HOSPADM

## 2024-05-15 RX ORDER — POTASSIUM CHLORIDE 7.45 MG/ML
10 INJECTION INTRAVENOUS PRN
Status: DISCONTINUED | OUTPATIENT
Start: 2024-05-15 | End: 2024-05-16

## 2024-05-15 RX ORDER — ASPIRIN 81 MG/1
81 TABLET, CHEWABLE ORAL DAILY
Status: DISCONTINUED | OUTPATIENT
Start: 2024-05-15 | End: 2024-05-17 | Stop reason: HOSPADM

## 2024-05-15 RX ORDER — FENTANYL CITRATE 50 UG/ML
INJECTION, SOLUTION INTRAMUSCULAR; INTRAVENOUS PRN
Status: DISCONTINUED | OUTPATIENT
Start: 2024-05-15 | End: 2024-05-15 | Stop reason: HOSPADM

## 2024-05-15 RX ORDER — SODIUM CHLORIDE 0.9 % (FLUSH) 0.9 %
5-40 SYRINGE (ML) INJECTION PRN
Status: DISCONTINUED | OUTPATIENT
Start: 2024-05-15 | End: 2024-05-17 | Stop reason: HOSPADM

## 2024-05-15 RX ORDER — GABAPENTIN 300 MG/1
300 CAPSULE ORAL 3 TIMES DAILY
Status: DISCONTINUED | OUTPATIENT
Start: 2024-05-15 | End: 2024-05-17 | Stop reason: HOSPADM

## 2024-05-15 RX ORDER — ONDANSETRON 2 MG/ML
4 INJECTION INTRAMUSCULAR; INTRAVENOUS EVERY 6 HOURS PRN
Status: DISCONTINUED | OUTPATIENT
Start: 2024-05-15 | End: 2024-05-17 | Stop reason: HOSPADM

## 2024-05-15 RX ORDER — SODIUM CHLORIDE 9 MG/ML
INJECTION, SOLUTION INTRAVENOUS CONTINUOUS
Status: DISCONTINUED | OUTPATIENT
Start: 2024-05-15 | End: 2024-05-16

## 2024-05-15 RX ORDER — HEPARIN SODIUM 1000 [USP'U]/ML
INJECTION, SOLUTION INTRAVENOUS; SUBCUTANEOUS PRN
Status: DISCONTINUED | OUTPATIENT
Start: 2024-05-15 | End: 2024-05-15 | Stop reason: HOSPADM

## 2024-05-15 RX ORDER — QUETIAPINE FUMARATE 100 MG/1
100 TABLET, FILM COATED ORAL 2 TIMES DAILY
Status: DISCONTINUED | OUTPATIENT
Start: 2024-05-15 | End: 2024-05-17 | Stop reason: HOSPADM

## 2024-05-15 RX ORDER — ACETAMINOPHEN 325 MG/1
650 TABLET ORAL EVERY 4 HOURS PRN
Status: DISCONTINUED | OUTPATIENT
Start: 2024-05-15 | End: 2024-05-17 | Stop reason: HOSPADM

## 2024-05-15 RX ADMIN — SODIUM CHLORIDE: 9 INJECTION, SOLUTION INTRAVENOUS at 04:24

## 2024-05-15 RX ADMIN — ASPIRIN 81 MG: 81 TABLET, CHEWABLE ORAL at 08:01

## 2024-05-15 RX ADMIN — SODIUM CHLORIDE: 9 INJECTION, SOLUTION INTRAVENOUS at 09:54

## 2024-05-15 RX ADMIN — ENOXAPARIN SODIUM 70 MG: 100 INJECTION SUBCUTANEOUS at 02:04

## 2024-05-15 RX ADMIN — GABAPENTIN 300 MG: 300 CAPSULE ORAL at 21:50

## 2024-05-15 RX ADMIN — QUETIAPINE FUMARATE 100 MG: 100 TABLET ORAL at 21:50

## 2024-05-15 RX ADMIN — GABAPENTIN 300 MG: 300 CAPSULE ORAL at 08:02

## 2024-05-15 RX ADMIN — SODIUM CHLORIDE, PRESERVATIVE FREE 10 ML: 5 INJECTION INTRAVENOUS at 21:51

## 2024-05-15 RX ADMIN — CLOPIDOGREL BISULFATE 75 MG: 75 TABLET ORAL at 08:03

## 2024-05-15 RX ADMIN — ATORVASTATIN CALCIUM 40 MG: 20 TABLET, FILM COATED ORAL at 21:50

## 2024-05-15 ASSESSMENT — PAIN - FUNCTIONAL ASSESSMENT: PAIN_FUNCTIONAL_ASSESSMENT: NONE - DENIES PAIN

## 2024-05-15 ASSESSMENT — ENCOUNTER SYMPTOMS
SHORTNESS OF BREATH: 0
SORE THROAT: 0
CONSTIPATION: 0

## 2024-05-15 ASSESSMENT — PAIN SCALES - GENERAL: PAINLEVEL_OUTOF10: 0

## 2024-05-15 NOTE — H&P
History & Physical    Primary Care Provider: Lashell Shirley MD  Source of Information: Patient and chart review    History of Presenting Illness:   Dannie James is a 63 y.o. male with hx of cad s/p recent pci, MI, mdd, bradycardia, PAD: S/p femoral bypass in 2018, is s/p right AKA, hyperthyroidism who presented to ed with complaints of chest discomfort. Reports sudden onset of pressure-like, non-radiating substernal chest pain earlier in the evening w/o any inciting, aggravating or relieving factors. Symptoms have since resolved since ED presentation  Chart review shows recent admission to [facility] from 5/8 to 5/13.  He had presented to ed from his cardiologist office after he was reo=portedly noted noted to go from sinus rhythm to sinus bradycardia with PVCs and A-fib..  Subsequently had pci to Lcx.  The patient denies any fever, chills, chest or abdominal pain, nausea, vomiting, cough, congestion, recent illness, palpitations, or dysuria.    Remarkable vitals on ER Presentation: vss  Labs Remarkable for: trop 744  ER Images: cxr: no acute process  ER Rx: lovenox 70mg     Review of Systems:  Pertinent items are noted in the History of Present Illness.     Past Medical History:   Diagnosis Date    Hypercholesterolemia     MI (myocardial infarction) (HCC)     Spinal stenosis       Past Surgical History:   Procedure Laterality Date    ABOVE KNEE AMPUTATION      CARDIAC PROCEDURE N/A 5/10/2024    Left heart cath / coronary angiography performed by Ronnie Bhatia DO at [facility] CARDIAC CATH LAB    FEMORAL BYPASS      JOINT REPLACEMENT       Prior to Admission medications    Medication Sig Start Date End Date Taking? Authorizing Provider   naproxen (NAPROSYN) 250 MG tablet Take 1 tablet by mouth 2 times daily (with meals) 5/13/24   Tucker Manning MD   DULoxetine (CYMBALTA) 30 MG extended release capsule Take 1 capsule by mouth daily  Patient not taking: Reported on 5/8/2024    Provider, MD Usman   gabapentin  (NEURONTIN) 300 MG capsule Take 1 capsule by mouth 3 times daily.    Provider, MD Usman   clopidogrel (PLAVIX) 75 MG tablet Take 1 tablet by mouth daily    Usman Smith MD   QUEtiapine (SEROQUEL) 100 MG tablet Take 1 tablet by mouth 2 times daily    ProviderUsman MD   aspirin 81 MG chewable tablet Take 1 tablet by mouth daily 5/19/23   Hugo Rangel MD   atorvastatin (LIPITOR) 40 MG tablet Take 1 tablet by mouth nightly 5/18/23   Hugo Rangel MD     No Known Allergies   No family history on file.     SOCIAL HISTORY:  Patient resides:  Independently x   Assisted Living    SNF    With family care       Smoking history:   None x   Former    Chronic      Alcohol history:   None x   Social    Chronic      Ambulates:   Independently x   w/cane    w/walker    w/wc    CODE STATUS:  DNR    Full x   Other      Objective:     Physical Exam:     BP (!) 111/51   Pulse 86   Temp 98.1 °F (36.7 °C) (Oral)   Resp 17   Ht 1.803 m (5' 11\")   Wt 72.6 kg (160 lb)   SpO2 96%   BMI 22.32 kg/m²         General:  Alert, cooperative, no distress, appears stated age.   Head:  Normocephalic, without obvious abnormality, atraumatic.   Eyes:  Conjunctivae/corneas clear. PERRL, EOMs intact.   Nose: Nares normal. Septum midline. Mucosa normal.        Neck: Supple, symmetrical, trachea midline.       Lungs:   Scattered, faint expiratory wheezes   Chest wall:  No tenderness or deformity.   Heart:  Regular rate and rhythm, S1, S2 normal   Abdomen:   Soft, non-tender. Bowel sounds normal. No masses,  No organomegaly.   Extremities: Extremities normal, atraumatic, no cyanosis or edema.   Pulses: 2+ and symmetric all extremities.   Skin: Skin color, texture, turgor normal. No rashes or lesions   Neurologic: CNII-XII grossly intact.          EKG:  nsr  Data Review:     Recent Days:  Recent Labs     05/13/24  0421 05/15/24  0111   WBC 5.1 4.4   HGB 10.0* 10.4*   HCT 30.2* 31.6*   * 464*     Recent Labs

## 2024-05-15 NOTE — ED NOTES
Pt states audible wheezing is from a hole in the nose due to prior drug use. Pt lungs sound clear upon auscultation.

## 2024-05-15 NOTE — CONSULTS
[facility]                    Cardiology Consult  Note     [x]Initial Encounter     []Follow-up    Patient Name: Dannie James - :1960 - MRN:[id number]  Primary Cardiologist:Dr. Araujo  Consulting Cardiologist: Dr. Meaghan Akins      Assessment/Plan/Discussion:Cardiology Attending:     Patient seen on the day of progress note and examined  reviewed  with Advance Practice Provider (ANNIE, NP,PA)       A/P/ Medical Decision Making:  NSTEMI recurrent disease known residual disease in LAD and recent stent in the circumflex.  Mild to moderate AS with no CHF and preserved LV function  Hypothyroidism  Recurrent bradycardia  PVCs  Will discuss with Dr. Bhatia about date of cath to relook at LAD  and circumflex  Avoid beta-blocker given prior bradycardia  Continue DAPT and high potency statin  Continue Lovenox        Dannie James is a 63 y.o. male   Discharged 2 days ago came back with chest pain  Patient presented this morning around 1 AM with chest pressure reported fatigue when he went home going to bed and then woke up with substernal chest pressure took aspirin and has been compliant with medications including Plavix.  This morning pain is still lingering but reduced in severity.  Troponin 744, 938, 1365    Prior PCI , MI  Admission NSTEMI 2024 discharge 2024 (2 days ago)  Cardiac cath 5/10/2024 open left main LAD ostial 60% stenosis mid 70% stenosis IFR 0.82.  Circumflex tubular 90% stenosis involving ostium distal circumflex into the left-sided PDA with long previous stent mild in-stent restenosis distal circumflex severe 80 to 90% fibrotic in-stent restenosis.  Patient had dilation of the left-sided PDA IVUS showed fibrotic stenosis.  Proximal circumflex dilated and stent placed.  Electively pursued left circumflex PCI and consider staged PCI of proximal LAD.  EP consult 5/10/2024 PVCs frequent with noted cardiomyopathy noted Holter 2024 showed a PVC burden of 5% in  stenosis FRACISCO 1.8 cm2. Patient stated that he had and MI with stents about 3 yrs ago but, there is no documentation of this in [facility] or from cardiologists office. Stress test per office 2017 EF was 50%.  Holter 2024 baseline was sinus with heart rates 35-61 with PVCs and couplets.  Currently still smoking. Mr. James was admitted last week with chest pain, had a PCI to his LCx on 5/10 and was discharged home. Patient had sudden onset of onset of pressure like, non radiating substernal chest pain  yesterday with out any inciting, aggravating or relieving factors. Symptoms have resolved since ED presentation    Subjective:        Dannie James patient currently denies chest pain palpitations, shortness of breath, syncope, orthopnea, abdominal pain.  Reports mild headache. Discussed possible  cardiac cath with patient.    NSTEMI/recent stent/CAD hx -Trops  744 now 938, ASA, statin, plavix, BB repeat Trops, poss cath today has residual dz to prox LAD, cardiac cath today  HFpEF- Echo  is unchanged from previous with mild to moderate AS, EF 50-55%  Hyperthyroidism TSH low < 0.01 from 1.18 2023, will need free t4 and follow up, Follow up to Endocrine  HLPD on atorvastatin, controlled, , HDL 27, TG 74, LDL 68  TIA in April - on asa.plavix  Hx DVTs - cont DVT ppx with lovenox post cath   Polysubstance abuse - mostly cocaine and methamphetamines, sober 9 months     Full Code  DVT ppx - per primary lovenox  Dispo- per primary, pending, NPO for possible  cardiac catheterization, discussed with patient, attending.  ___________________________________________________________    Cardiac testin24    CARDIAC PROCEDURE 2024  7:18 AM (Final)    Conclusion    Severe stenosis of proximal lcx with severe ISR of distal lcx/l-pda stents.    Proximal lad stenosis with iFR 0.82    Normal lvedp    Findings:  L Main:large caliber, mild distal left main disease  LAD:large caliber, ostial lad 50-60% stenosis,

## 2024-05-15 NOTE — PROCEDURES
PROCEDURE NOTE  Date: 5/15/2024   Name: Dannie James  YOB: 1960    BRIEF PROCEDURE NOTE    Date of Procedure: 5/15/2024   Preoperative Diagnosis:  pvcs  Postoperative Diagnosis: cad    Procedure: Left heart cath, coronary angiography, moderate sedation, iFR lad, iFR lcx, angioplasty l-PDA, pci w. Triston Lcx, ivus lcx  Interventional Cardiologist: oRnnie Bhatia DO  Assistant: None  Anesthesia: local + IV moderate sedation   I administered moderate sedation throughout this procedure. An independent trained observer pushed medications at my direction, and monitored the patient’s level of consciousness and physiological status throughout.  Estimated Blood Loss: Minimal    Access: right radial artery, 6F  Catheters:  Left coronary:JL 3.5, 5f  Right coronary: JR 4, 5f    Findings:   L Main:large caliber, mild distal left main disease by ivus  LAD:large caliber, ostial lad 50-60% stenosis (IVUS mla 5.8mm2), mid-vessel 60-70% stenosis (ivus mla 4.5mm2), small diagonals with diffuse mild to moderate disease  LCx: large caliber, dominant, proximal lcx widely patent,  moderate om with mild ostial stenopsis with diffuse disease and anastasia 3 flow, distal Lcx into l-pl branch with long stent, mild isr within distal lcx with severe >80-90% fibrotic ISR within proximal l-pl branch  RCA: non-dominant        Specimens Removed: None    Implants: leighton triston    Closure Device: radial TR band    See full cath note.    Complications: none      Findings:  1. Stable mild to moderate distal left main disease involving ostium of lad  2. Stable moderate to severe ostial/proximal lad and mid-lad . Previous iFR acroos lad 0.86  3. Stable severe ISR of distal lcx into pl branch      Plan:    Stable cad compared to prior pci.    Will discuss cabg v. High risk pci v. Medical management with patient      [place]  [address] [address], [address]  [place] [address]

## 2024-05-15 NOTE — ED TRIAGE NOTES
Patient brought via EMS with chest pressure upon waking up from sleep, in center of chest; described as a \"cinder block\" on chest.  No chest pressure upon arrival.  Stated he felt very weak and short of breath all day.  Hx of stent placed last Thursday.    EMS gave 325mg of Tylenol.

## 2024-05-15 NOTE — DISCHARGE INSTRUCTIONS
Radial Cardiac Catheterization/Angiography Discharge Instructions   It is normal to feel tired the first couple days. Take it easy and follow the physician’s instructions.   CHECK THE CATHETER INSERTION SITE DAILY:   Remove the wrist dressing 24 hours after the procedure.   You may shower 24 hours after the procedure. Wash with soap and water and pat dry.   Gentle cleaning of the site with soap and water is sufficient, cover with a dry clean dressing or bandage. Do not apply creams or powders to the area.   No soaking the wrist for 3 days.   Leave the puncture site open to air after 24 hours post-procedure.   CALL THE PHYSICIANS:   If the site becomes red, swollen or feels warm to the touch   If there is bleeding or drainage or if there is unusual pain at the radial site.   If there is any minor oozing, you may apply a band-aid and remove after 12 hours.   If the bleeding continues, hold pressure with the middle finger against the puncture site and the thumb against the back of the wrist,call 911 to be transported to the hospital.   DO NOT DRIVE YOURSELF, OR HAVE ANYONE ELSE DRIVE YOU - CALL 911.   ACTIVITY:   For the first 24 hours do not manipulate the wrist.   No lifting, pushing or pulling over 3-5 pounds with the affected wrist for 7 daysand no straining the insertion site. Do not life grocery bags or the garbage can, do not run the vacuum  or  for 7 days.   Start with short walks as in the hospital and gradually increase as tolerated each day. It is recommended to walk 30 minutes 5-7 days per week.   Follow your physician’s instructions on activity. Avoid walking outside in extremes of heat or cold. Walk inside when it is cold and windy or hot and humid.   Things to keep in mind:   No driving for at least 24 hours, or as designated by your physician.   Limit the number of times you go up and down the stairs   Take rests and pace yourself with activity.   Be careful and do not strain with bowel  movements.   MEDICATIONS:   Take all medications as prescribed   Call your physician if you have any questions   Keep an updated list of your medications with you at all times and give a list to your physician and pharmacist   SIGNS AND SYMPTOMS:   Be cautious of symptoms of angina or recurrent symptoms such as chest discomfort, unusual shortness of breath or fatigue. These could be symptoms of restenosis, a new blockage or a heart attack.   If your symptoms are relieved with rest it is still recommended that you notify your physician of recurrent chest pain or discomfort.   For CHEST PAIN or symptoms of angina not relieved with rest: If the discomfort is not relieved with rest, and you have been prescribed Nitroglycerin, take as directed (taken under the tongue, one at a time 5 minutes apart for a total of 3 doses). If the discomfort is not relieved after the 3rd nitroglycerin, call 911. If you have not been prescribed Nitroglycerin and your chest discomfort is not relieved with rest, call 911.   AFTER CARE:   Follow up with your physician as instructed.   Follow a heart healthy diet with proper portion control, daily stress management, daily exercise, blood pressure and cholesterol control , and smoking cessation.          HOSPITALIST DISCHARGE INSTRUCTIONS  NAME:  Dannie James   :  1960   MRN:  [id number]     Date/Time:  2024 2:37 PM    ADMIT DATE: 5/15/2024     DISCHARGE DATE: 2024     DISCHARGE DIAGNOSIS:  Chest pain s/p cardiac cath    DISCHARGE INSTRUCTIONS:  Thank you for allowing us to participate in your care. Your discharging Hospitalist is Tessie Parikh MD. You were admitted for evaluation and treatment of the above.       MEDICATIONS:    It is important that you take the medication exactly as they are prescribed.   Keep your medication in the bottles provided by the pharmacist and keep a list of the medication names, dosages, and times to be taken in your wallet.   Do not take

## 2024-05-15 NOTE — ED NOTES
Received and verbally repeated the following test results Lab: Troponin  1365 from LAB on 5/15/2024, at 9:25 AM.   Primary nurse Analy RUTLEDGE made aware.       OBDULIA GROSS LPN

## 2024-05-15 NOTE — PROGRESS NOTES
2:22 PM  TRANSFER - OUT REPORT:    Verbal report given to Marielos James  being transferred to Cath lab for ordered procedure       Report consisted of patient's Situation, Background, Assessment and   Recommendations(SBAR).     Information from the following report(s) Nurse Handoff Report was reviewed with the receiving nurse.           Lines:   Peripheral IV 05/15/24 Left;Anterior Forearm (Active)        Opportunity for questions and clarification was provided.      Patient transported with:  Registered Nurse    1510:  TRANSFER - IN REPORT:    Verbal report received from Kirsten James  being received from cath lab for routine post-op      Report consisted of patient's Situation, Background, Assessment and   Recommendations(SBAR).     Information from the following report(s) Nurse Handoff Report was reviewed with the receiving nurse.    Opportunity for questions and clarification was provided.      Assessment completed upon patient's arrival to unit and care assumed.     4:56 PM  2 ml air released from TR Band. No bleeding or hematoma noted. Radial and Ulnar pulse on right wrist palpable. Pt tolerated well. Will continue to monitor.    5:01 PM  3 ml air released from TR Band. No bleeding or hematoma noted. Radial and Ulnar pulse on right wrist palpable. Pt tolerated well. Will continue to monitor.    5:06 PM  4 ml air released from TR Band. No bleeding or hematoma noted. Radial and Ulnar pulse on right wrist palpable. Pt tolerated well. Will continue to monitor.    5:11 PM  5 ml air released from TR Band. No bleeding or hematoma noted. Radial and Ulnar pulse on right wrist palpable. Pt tolerated well. Will continue to monitor.    Air release completed. TR Band removed from right wrist. No bleeding or  Hematoma. Dressing applied. Wrist immobilizer in place. Radial and ulnar pulse remain palpable on affected extremity. Pt tolerated well. Instructions given to pt regarding movement and activity  restrictions. Pt voiced understanding.    5:48 PM  TRANSFER - OUT REPORT:    Verbal report given to IMCU RN on Dannie James  being transferred to [facility] for routine progression of patient care       Report consisted of patient's Situation, Background, Assessment and   Recommendations(SBAR).     Information from the following report(s) Nurse Handoff Report was reviewed with the receiving nurse.           Lines:   Peripheral IV 05/15/24 Left;Anterior Forearm (Active)        Opportunity for questions and clarification was provided.      Patient transported with:  Registered Nurse    Site check with IMCU RN. Right wrist clean, dry, and intact with no bleeding or hematoma noted.

## 2024-05-16 PROBLEM — R07.89 CHEST PRESSURE: Status: ACTIVE | Noted: 2024-05-16

## 2024-05-16 LAB
ANION GAP SERPL CALC-SCNC: 3 MMOL/L (ref 5–15)
BASOPHILS # BLD: 0 K/UL (ref 0–0.1)
BASOPHILS NFR BLD: 1 % (ref 0–1)
BUN SERPL-MCNC: 17 MG/DL (ref 6–20)
BUN/CREAT SERPL: 21 (ref 12–20)
CALCIUM SERPL-MCNC: 9.3 MG/DL (ref 8.5–10.1)
CHLORIDE SERPL-SCNC: 105 MMOL/L (ref 97–108)
CO2 SERPL-SCNC: 28 MMOL/L (ref 21–32)
CREAT SERPL-MCNC: 0.82 MG/DL (ref 0.7–1.3)
DIFFERENTIAL METHOD BLD: ABNORMAL
ECHO BSA: 1.91 M2
EOSINOPHIL # BLD: 0.2 K/UL (ref 0–0.4)
EOSINOPHIL NFR BLD: 4 % (ref 0–7)
ERYTHROCYTE [DISTWIDTH] IN BLOOD BY AUTOMATED COUNT: 13 % (ref 11.5–14.5)
GLUCOSE SERPL-MCNC: 117 MG/DL (ref 65–100)
HCT VFR BLD AUTO: 28.9 % (ref 36.6–50.3)
HGB BLD-MCNC: 9.7 G/DL (ref 12.1–17)
IMM GRANULOCYTES # BLD AUTO: 0 K/UL (ref 0–0.04)
IMM GRANULOCYTES NFR BLD AUTO: 1 % (ref 0–0.5)
LYMPHOCYTES # BLD: 0.9 K/UL (ref 0.8–3.5)
LYMPHOCYTES NFR BLD: 18 % (ref 12–49)
MCH RBC QN AUTO: 28.9 PG (ref 26–34)
MCHC RBC AUTO-ENTMCNC: 33.6 G/DL (ref 30–36.5)
MCV RBC AUTO: 86 FL (ref 80–99)
MONOCYTES # BLD: 0.6 K/UL (ref 0–1)
MONOCYTES NFR BLD: 10 % (ref 5–13)
NEUTS SEG # BLD: 3.5 K/UL (ref 1.8–8)
NEUTS SEG NFR BLD: 66 % (ref 32–75)
NRBC # BLD: 0 K/UL (ref 0–0.01)
NRBC BLD-RTO: 0 PER 100 WBC
NT PRO BNP: 2207 PG/ML
PLATELET # BLD AUTO: 475 K/UL (ref 150–400)
PMV BLD AUTO: 9.6 FL (ref 8.9–12.9)
POTASSIUM SERPL-SCNC: 4.5 MMOL/L (ref 3.5–5.1)
RBC # BLD AUTO: 3.36 M/UL (ref 4.1–5.7)
SODIUM SERPL-SCNC: 136 MMOL/L (ref 136–145)
WBC # BLD AUTO: 5.3 K/UL (ref 4.1–11.1)

## 2024-05-16 PROCEDURE — 6360000002 HC RX W HCPCS: Performed by: INTERNAL MEDICINE

## 2024-05-16 PROCEDURE — 6370000000 HC RX 637 (ALT 250 FOR IP): Performed by: STUDENT IN AN ORGANIZED HEALTH CARE EDUCATION/TRAINING PROGRAM

## 2024-05-16 PROCEDURE — 36415 COLL VENOUS BLD VENIPUNCTURE: CPT

## 2024-05-16 PROCEDURE — 2580000003 HC RX 258: Performed by: STUDENT IN AN ORGANIZED HEALTH CARE EDUCATION/TRAINING PROGRAM

## 2024-05-16 PROCEDURE — 83880 ASSAY OF NATRIURETIC PEPTIDE: CPT

## 2024-05-16 PROCEDURE — 80048 BASIC METABOLIC PNL TOTAL CA: CPT

## 2024-05-16 PROCEDURE — G0378 HOSPITAL OBSERVATION PER HR: HCPCS

## 2024-05-16 PROCEDURE — 94761 N-INVAS EAR/PLS OXIMETRY MLT: CPT

## 2024-05-16 PROCEDURE — 96372 THER/PROPH/DIAG INJ SC/IM: CPT

## 2024-05-16 PROCEDURE — 6370000000 HC RX 637 (ALT 250 FOR IP): Performed by: INTERNAL MEDICINE

## 2024-05-16 PROCEDURE — 85025 COMPLETE CBC W/AUTO DIFF WBC: CPT

## 2024-05-16 PROCEDURE — 99233 SBSQ HOSP IP/OBS HIGH 50: CPT | Performed by: STUDENT IN AN ORGANIZED HEALTH CARE EDUCATION/TRAINING PROGRAM

## 2024-05-16 RX ORDER — ALPRAZOLAM 0.25 MG/1
0.25 TABLET ORAL 4 TIMES DAILY PRN
Status: DISCONTINUED | OUTPATIENT
Start: 2024-05-16 | End: 2024-05-17 | Stop reason: HOSPADM

## 2024-05-16 RX ORDER — ISOSORBIDE MONONITRATE 30 MG/1
30 TABLET, EXTENDED RELEASE ORAL DAILY
Status: DISCONTINUED | OUTPATIENT
Start: 2024-05-16 | End: 2024-05-17 | Stop reason: HOSPADM

## 2024-05-16 RX ORDER — ENOXAPARIN SODIUM 100 MG/ML
40 INJECTION SUBCUTANEOUS EVERY EVENING
Status: DISCONTINUED | OUTPATIENT
Start: 2024-05-16 | End: 2024-05-17 | Stop reason: HOSPADM

## 2024-05-16 RX ORDER — RANOLAZINE 500 MG/1
500 TABLET, EXTENDED RELEASE ORAL 2 TIMES DAILY
Status: DISCONTINUED | OUTPATIENT
Start: 2024-05-16 | End: 2024-05-16

## 2024-05-16 RX ADMIN — QUETIAPINE FUMARATE 100 MG: 100 TABLET ORAL at 21:20

## 2024-05-16 RX ADMIN — ENOXAPARIN SODIUM 40 MG: 100 INJECTION SUBCUTANEOUS at 17:43

## 2024-05-16 RX ADMIN — SODIUM CHLORIDE, PRESERVATIVE FREE 10 ML: 5 INJECTION INTRAVENOUS at 21:22

## 2024-05-16 RX ADMIN — ASPIRIN 81 MG: 81 TABLET, CHEWABLE ORAL at 09:31

## 2024-05-16 RX ADMIN — METOPROLOL TARTRATE 25 MG: 25 TABLET, FILM COATED ORAL at 14:32

## 2024-05-16 RX ADMIN — ALPRAZOLAM 0.25 MG: 0.25 TABLET ORAL at 21:19

## 2024-05-16 RX ADMIN — METOPROLOL TARTRATE 25 MG: 25 TABLET, FILM COATED ORAL at 21:20

## 2024-05-16 RX ADMIN — GABAPENTIN 300 MG: 300 CAPSULE ORAL at 09:31

## 2024-05-16 RX ADMIN — GABAPENTIN 300 MG: 300 CAPSULE ORAL at 21:20

## 2024-05-16 RX ADMIN — CLOPIDOGREL BISULFATE 75 MG: 75 TABLET ORAL at 09:31

## 2024-05-16 RX ADMIN — ATORVASTATIN CALCIUM 40 MG: 20 TABLET, FILM COATED ORAL at 21:21

## 2024-05-16 RX ADMIN — GABAPENTIN 300 MG: 300 CAPSULE ORAL at 14:32

## 2024-05-16 RX ADMIN — SODIUM CHLORIDE, PRESERVATIVE FREE 10 ML: 5 INJECTION INTRAVENOUS at 21:21

## 2024-05-16 RX ADMIN — ISOSORBIDE MONONITRATE 30 MG: 30 TABLET, EXTENDED RELEASE ORAL at 12:44

## 2024-05-16 ASSESSMENT — PAIN SCALES - GENERAL
PAINLEVEL_OUTOF10: 0
PAINLEVEL_OUTOF10: 0

## 2024-05-16 NOTE — PLAN OF CARE
Standard Safety Precautions/Interventions discussed with patient; Verbalized understanding; reinforce each shift

## 2024-05-16 NOTE — PROGRESS NOTES
[facility]                    Cardiology Consult  Note     []Initial Encounter     [x]Follow-up    Patient Name: Dannie James - :1960 - MRN:[id number]  Primary Cardiologist:Dr. Araujo  Consulting Cardiologist: Dr. Bhatia     Reason for encounter: Sent by cardiologist of for shortness of breath     HPI:       Dannie James is a 63 y.o. male with PMH significant for recent TIA in 2024, HLPD, severe PAD with bilateral fem pops and right AKA  (Dr. Armando Torres) , renal stones, spinal stenosis and multiple lower ext DVTS,detached retina, sowmya hip replace, bilat shoulder repair, history polysubstance abuse (last use 9 mos ago . Last office echo was 2024 EF was 45% with LVH, mod tricuspid regurgitatation, mild /mod mitral regurg, mild aortic stenosis FRACISCO 1.8 cm2. Patient stated that he had and MI with stents about 3 yrs ago but, there is no documentation of this in [facility] or from cardiologists office. Stress test per office 2017 EF was 50%.  Holter 2024 baseline was sinus with heart rates 35-61 with PVCs and couplets.  Currently still smoking. Mr. James was admitted last week with chest pain, had a PCI to his LCx on 5/10 and was discharged home. Patient had sudden onset of onset of pressure like, non radiating substernal chest pain  with out any inciting, aggravating or relieving factors.     Subjective: No events overnight.  Mr. James denies chest pain, dizziness, headache, shortness of breath. Patient tearful as he stated that he has \"no one\".  He stated he had a sister and wanted me to reach out to her. Called and voicemail was full.       NSTEMI/recent stent/CAD hx -cath 5/15 with stable CAD, mod distal left main with ostium of LAD, Stable severe ISR of distal lcx into pl branch           - CABG vs High risk PCI, on ASA, plavix, statin, hold BB b/t bradycardia  HFpEF- BNP 2207, improving, now Echo  is unchanged from previous with mild to moderate AS, EF  05/15/2024    AST 28 05/15/2024    ALT 46 05/15/2024    LABGLOM >90 05/15/2024    GLOB 4.6 (H) 05/15/2024     Current meds:    Current Facility-Administered Medications:     sodium chloride flush 0.9 % injection 5-40 mL, 5-40 mL, IntraVENous, 2 times per day, Leia Vallejo MD    sodium chloride flush 0.9 % injection 5-40 mL, 5-40 mL, IntraVENous, PRN, Leia Vallejo MD    0.9 % sodium chloride infusion, , IntraVENous, PRN, Leia Vallejo MD    potassium chloride (KLOR-CON) extended release tablet 40 mEq, 40 mEq, Oral, PRN **OR** potassium bicarb-citric acid (EFFER-K) effervescent tablet 40 mEq, 40 mEq, Oral, PRN **OR** potassium chloride 10 mEq/100 mL IVPB (Peripheral Line), 10 mEq, IntraVENous, PRN, Leia Vallejo MD    magnesium sulfate 2000 mg in 50 mL IVPB premix, 2,000 mg, IntraVENous, PRN, Leia Vallejo MD    ondansetron (ZOFRAN-ODT) disintegrating tablet 4 mg, 4 mg, Oral, Q8H PRN **OR** ondansetron (ZOFRAN) injection 4 mg, 4 mg, IntraVENous, Q6H PRN, Leia Vallejo MD    polyethylene glycol (GLYCOLAX) packet 17 g, 17 g, Oral, Daily PRN, Leia Vallejo MD    acetaminophen (TYLENOL) tablet 650 mg, 650 mg, Oral, Q6H PRN **OR** acetaminophen (TYLENOL) suppository 650 mg, 650 mg, Rectal, Q6H PRN, Leia Vallejo MD    0.9 % sodium chloride infusion, , IntraVENous, Continuous, Leia Vallejo MD, Last Rate: 75 mL/hr at 05/15/24 0954, New Bag at 05/15/24 0954    aspirin chewable tablet 81 mg, 81 mg, Oral, Daily, Leia Vallejo MD, 81 mg at 05/15/24 0801    atorvastatin (LIPITOR) tablet 40 mg, 40 mg, Oral, Nightly, Leia Vallejo MD, 40 mg at 05/15/24 2150    clopidogrel (PLAVIX) tablet 75 mg, 75 mg, Oral, Daily, Leia Vallejo MD, 75 mg at 05/15/24 0803    gabapentin (NEURONTIN) capsule 300 mg, 300 mg, Oral, TID, Leia Vallejo MD, 300 mg at 05/15/24 2150    QUEtiapine (SEROQUEL) tablet 100 mg, 100 mg, Oral, BID, Leia Vallejo MD, 100 mg at 05/15/24 2150     sodium chloride flush 0.9 % injection 5-40 mL, 5-40 mL, IntraVENous, 2 times per day, Ronnie Bhatia DO, 10 mL at 05/15/24 5281    sodium chloride flush 0.9 % injection 5-40 mL, 5-40 mL, IntraVENous, PRN, Ronnie Bhatia DO    0.9 % sodium chloride infusion, , IntraVENous, PRN, Ronnie Bhatia DO    acetaminophen (TYLENOL) tablet 650 mg, 650 mg, Oral, Q4H PRN, Ronnie Bhatia DO Kathleen Taylor, SOURA[facility] - [facility]  Call center: (P) [phone]  (F) [phone]      CC:Lashell Shirley MD

## 2024-05-16 NOTE — PROGRESS NOTES
[facility]  [address] [place] [address] [phone]        Hospitalist Progress Note      NAME: Dannie James   :  1960  MRM:  [id number]    Date/Time: 2024  3:51 PM         Subjective:     Chief Complaint: \"I'm scared\"     Pt seen and examined. Overwhelmed regarding new for possible CABG in the future and nervous that his symptoms will recur. Dr. Bhatia discussed cath results     ROS:  (bold if positive, if negative)         Objective:       Vitals:          Last 24hrs VS reviewed since prior progress note. Most recent are:    Vitals:    24 1544   BP: 112/61   Pulse: 71   Resp: 18   Temp: 98.2 °F (36.8 °C)   SpO2: 97%     SpO2 Readings from Last 6 Encounters:   24 97%   24 98%   23 97%    PreHospital Care  Analgesics Taken or Received PTA?: No     Intake/Output Summary (Last 24 hours) at 2024 1551  Last data filed at 2024 0720  Gross per 24 hour   Intake --   Output 420 ml   Net -420 ml          Exam:     Physical Exam:    Gen:  Well-developed, well-nourished, in no acute distress  HEENT:  Pink conjunctivae, PERRL, hearing intact to voice, moist mucous membranes  Neck:  Supple, without masses, thyroid non-tender  Resp:  No accessory muscle use, clear breath sounds without wheezes rales or rhonchi  Card:  No murmurs, normal S1, S2 without thrills, bruits or peripheral edema  Abd:  Soft, non-tender, non-distended, normoactive bowel sounds are present  Musc:  No cyanosis or clubbing  Skin:  No rashes or ulcers, skin turgor is good  Neuro:  Cranial nerves 3-12 are grossly intact,  strength is 5/5 bilaterally and dorsi / plantarflexion is 5/5 bilaterally, follows commands appropriately  Psych:  Anxious     Medications Reviewed: (see below)    Lab Data Reviewed: (see below)    ______________________________________________________________________    Medications:     Current Facility-Administered Medications   Medication    Mood Disorder  -on Cymbalta Seroquel     Neuropathy  -on gabapentin     Low TSH. Elevated T4 - appreciate endocrinology   -naproxen PRN     Total time spent with patient: 35 Minutes                  Care Plan discussed with: Patient and Nursing Staff    Discussed:  Care Plan    Prophylaxis:  Lovenox    Disposition:  Home w/Family           ___________________________________________________    Attending Physician: Tessie Parikh MD

## 2024-05-16 NOTE — PROGRESS NOTES
0719: Bedside and Verbal shift change report given to  RN (oncoming nurse) by Amanda RN (offgoing nurse). Report included the following information Adult Overview, Recent Results, Med Rec Status, and Cardiac Rhythm SBrady .      1002: spoke with case management advised needed to speak with patient due to expressed concern regarding support system and financials on treatment.     1035: messaged provider advised pt expressing a lot of emotions this morning regarding diagnosis and possible treatment. Seems really down not anxious. Advised did let  know and that pt refused Seroquel this morning reports only takes at night. Advised offered spiritual as well and pt declined.     1137: provider responded ok    1916: Bedside and Verbal shift change report given to Amanda RUTLEDGE (oncoming nurse) by  RN (offgoing nurse). Report included the following information Adult Overview, Recent Results, Med Rec Status, and Cardiac Rhythm SBrady .

## 2024-05-16 NOTE — CARE COORDINATION
Care Management Initial Assessment Note:        05/16/24 1154   Readmission Assessment   Number of Days since last admission? 1-7 days   Previous Disposition Other (comment)  (Returned to residential [facility])   Who is being Interviewed Patient   What was the patient's/caregiver's perception as to why they think they needed to return back to the hospital? Other (Comment)  (new sx)   Did you visit your Primary Care Physician after you left the hospital, before you returned this time? No   Why weren't you able to visit your PCP? Other (Comment)  (new sx prior to appointment)   Did you see a specialist, such as Cardiac, Pulmonary, Orthopedic Physician, etc. after you left the hospital? No   Who advised the patient to return to the hospital? Self-referral   Does the patient report anything that got in the way of taking their medications? No   In our efforts to provide the best possible care to you and others like you, can you think of anything that we could have done to help you after you left the hospital the first time, so that you might not have needed to return so soon? Other (Comment)  (patient had increasing chest pain and SOB, returned due to new sx)     Care Management Initial Assessment Note:      05/16/24 1211   Discharge Planning   Patient expects to be discharged to: Behavioral Health/Substance/Detox  ([facility] for substance abuse)   Services At/After Discharge   Transition of Care Consult (CM Consult) N/A   Mode of Transport at Discharge Other (see comment)  (Insurance transport at [facility])       CM met with patient who was just in-patient from 5/8-5/13. Patient returns due to increasing SOB and chest pain. Patient is independent @ baseline, lives in a residential [facility] for substance abuse the [facility]. Dispo is pending improvement in clinical status and cardiology clearance. CM is following for any needs. Patient will need transportation at time of

## 2024-05-17 ENCOUNTER — APPOINTMENT (OUTPATIENT)
Facility: HOSPITAL | Age: 64
DRG: 281 | End: 2024-05-17
Attending: STUDENT IN AN ORGANIZED HEALTH CARE EDUCATION/TRAINING PROGRAM
Payer: MEDICARE

## 2024-05-17 VITALS
DIASTOLIC BLOOD PRESSURE: 72 MMHG | SYSTOLIC BLOOD PRESSURE: 109 MMHG | OXYGEN SATURATION: 96 % | HEIGHT: 71 IN | RESPIRATION RATE: 16 BRPM | BODY MASS INDEX: 22.4 KG/M2 | TEMPERATURE: 97.9 F | HEART RATE: 66 BPM | WEIGHT: 160 LBS

## 2024-05-17 LAB
ANION GAP SERPL CALC-SCNC: 6 MMOL/L (ref 5–15)
BASOPHILS # BLD: 0 K/UL (ref 0–0.1)
BASOPHILS NFR BLD: 1 % (ref 0–1)
BUN SERPL-MCNC: 23 MG/DL (ref 6–20)
BUN/CREAT SERPL: 29 (ref 12–20)
CALCIUM SERPL-MCNC: 8.5 MG/DL (ref 8.5–10.1)
CHLORIDE SERPL-SCNC: 103 MMOL/L (ref 97–108)
CO2 SERPL-SCNC: 26 MMOL/L (ref 21–32)
CREAT SERPL-MCNC: 0.78 MG/DL (ref 0.7–1.3)
DIFFERENTIAL METHOD BLD: ABNORMAL
ECHO AO ROOT DIAM: 3.9 CM
ECHO AO ROOT INDEX: 2.03 CM/M2
ECHO AV AREA PEAK VELOCITY: 1.7 CM2
ECHO AV AREA VTI: 1.5 CM2
ECHO AV AREA/BSA PEAK VELOCITY: 0.9 CM2/M2
ECHO AV AREA/BSA VTI: 0.8 CM2/M2
ECHO AV MEAN GRADIENT: 16 MMHG
ECHO AV MEAN VELOCITY: 1.9 M/S
ECHO AV PEAK GRADIENT: 27 MMHG
ECHO AV PEAK VELOCITY: 2.6 M/S
ECHO AV VELOCITY RATIO: 0.38
ECHO AV VTI: 56.1 CM
ECHO BSA: 1.91 M2
ECHO LA DIAMETER INDEX: 1.3 CM/M2
ECHO LA DIAMETER: 2.5 CM
ECHO LA TO AORTIC ROOT RATIO: 0.64
ECHO LV EDV A2C: 170 ML
ECHO LV EDV A4C: 165 ML
ECHO LV EDV BP: 167 ML (ref 67–155)
ECHO LV EDV INDEX A4C: 86 ML/M2
ECHO LV EDV INDEX BP: 87 ML/M2
ECHO LV EDV NDEX A2C: 89 ML/M2
ECHO LV EJECTION FRACTION A2C: 32 %
ECHO LV EJECTION FRACTION A4C: 48 %
ECHO LV EJECTION FRACTION A4C: 51 %
ECHO LV EJECTION FRACTION BIPLANE: 41 % (ref 55–100)
ECHO LV ESV A2C: 115 ML
ECHO LV ESV A4C: 82 ML
ECHO LV ESV BP: 99 ML (ref 22–58)
ECHO LV ESV INDEX A2C: 60 ML/M2
ECHO LV ESV INDEX A4C: 43 ML/M2
ECHO LV ESV INDEX BP: 52 ML/M2
ECHO LV FRACTIONAL SHORTENING: 15 % (ref 28–44)
ECHO LV GLOBAL LONGITUDINAL STRAIN (GLS): -14.9 %
ECHO LV GLOBAL LONGITUDINAL STRAIN (GLS): -15.2 %
ECHO LV GLOBAL LONGITUDINAL STRAIN (GLS): -16 %
ECHO LV GLOBAL LONGITUDINAL STRAIN (GLS): -17.8 %
ECHO LV INTERNAL DIMENSION DIASTOLE INDEX: 3.18 CM/M2
ECHO LV INTERNAL DIMENSION DIASTOLIC: 6.1 CM (ref 4.2–5.9)
ECHO LV INTERNAL DIMENSION SYSTOLIC INDEX: 2.71 CM/M2
ECHO LV INTERNAL DIMENSION SYSTOLIC: 5.2 CM
ECHO LV IVSD: 0.6 CM (ref 0.6–1)
ECHO LV MASS 2D: 162.8 G (ref 88–224)
ECHO LV MASS INDEX 2D: 84.8 G/M2 (ref 49–115)
ECHO LV POSTERIOR WALL DIASTOLIC: 0.8 CM (ref 0.6–1)
ECHO LV RELATIVE WALL THICKNESS RATIO: 0.26
ECHO LVOT AREA: 4.2 CM2
ECHO LVOT AV VTI INDEX: 0.36
ECHO LVOT DIAM: 2.3 CM
ECHO LVOT MEAN GRADIENT: 2 MMHG
ECHO LVOT PEAK GRADIENT: 4 MMHG
ECHO LVOT PEAK VELOCITY: 1 M/S
ECHO LVOT STROKE VOLUME INDEX: 43.3 ML/M2
ECHO LVOT SV: 83.1 ML
ECHO LVOT VTI: 20 CM
ECHO TV REGURGITANT MAX VELOCITY: 2.47 M/S
ECHO TV REGURGITANT PEAK GRADIENT: 24 MMHG
EKG ATRIAL RATE: 53 BPM
EKG ATRIAL RATE: 84 BPM
EKG DIAGNOSIS: NORMAL
EKG DIAGNOSIS: NORMAL
EKG P AXIS: 67 DEGREES
EKG P AXIS: 68 DEGREES
EKG P-R INTERVAL: 142 MS
EKG P-R INTERVAL: 144 MS
EKG Q-T INTERVAL: 374 MS
EKG Q-T INTERVAL: 476 MS
EKG QRS DURATION: 80 MS
EKG QRS DURATION: 86 MS
EKG QTC CALCULATION (BAZETT): 441 MS
EKG QTC CALCULATION (BAZETT): 446 MS
EKG R AXIS: -10 DEGREES
EKG R AXIS: -15 DEGREES
EKG T AXIS: -42 DEGREES
EKG T AXIS: 24 DEGREES
EKG VENTRICULAR RATE: 53 BPM
EKG VENTRICULAR RATE: 84 BPM
EOSINOPHIL # BLD: 0.2 K/UL (ref 0–0.4)
EOSINOPHIL NFR BLD: 4 % (ref 0–7)
ERYTHROCYTE [DISTWIDTH] IN BLOOD BY AUTOMATED COUNT: 12.9 % (ref 11.5–14.5)
GLUCOSE SERPL-MCNC: 149 MG/DL (ref 65–100)
HCT VFR BLD AUTO: 27.5 % (ref 36.6–50.3)
HGB BLD-MCNC: 9.1 G/DL (ref 12.1–17)
IMM GRANULOCYTES # BLD AUTO: 0 K/UL (ref 0–0.04)
IMM GRANULOCYTES NFR BLD AUTO: 0 % (ref 0–0.5)
LYMPHOCYTES # BLD: 1.2 K/UL (ref 0.8–3.5)
LYMPHOCYTES NFR BLD: 24 % (ref 12–49)
MCH RBC QN AUTO: 28.4 PG (ref 26–34)
MCHC RBC AUTO-ENTMCNC: 33.1 G/DL (ref 30–36.5)
MCV RBC AUTO: 85.9 FL (ref 80–99)
MONOCYTES # BLD: 0.6 K/UL (ref 0–1)
MONOCYTES NFR BLD: 12 % (ref 5–13)
NEUTS SEG # BLD: 2.9 K/UL (ref 1.8–8)
NEUTS SEG NFR BLD: 59 % (ref 32–75)
NRBC # BLD: 0 K/UL (ref 0–0.01)
NRBC BLD-RTO: 0 PER 100 WBC
PLATELET # BLD AUTO: 456 K/UL (ref 150–400)
PMV BLD AUTO: 9.9 FL (ref 8.9–12.9)
POTASSIUM SERPL-SCNC: 4.5 MMOL/L (ref 3.5–5.1)
RBC # BLD AUTO: 3.2 M/UL (ref 4.1–5.7)
SODIUM SERPL-SCNC: 135 MMOL/L (ref 136–145)
WBC # BLD AUTO: 4.9 K/UL (ref 4.1–11.1)

## 2024-05-17 PROCEDURE — 99232 SBSQ HOSP IP/OBS MODERATE 35: CPT | Performed by: STUDENT IN AN ORGANIZED HEALTH CARE EDUCATION/TRAINING PROGRAM

## 2024-05-17 PROCEDURE — 2060000000 HC ICU INTERMEDIATE R&B

## 2024-05-17 PROCEDURE — 97116 GAIT TRAINING THERAPY: CPT

## 2024-05-17 PROCEDURE — 93356 MYOCRD STRAIN IMG SPCKL TRCK: CPT | Performed by: STUDENT IN AN ORGANIZED HEALTH CARE EDUCATION/TRAINING PROGRAM

## 2024-05-17 PROCEDURE — 93321 DOPPLER ECHO F-UP/LMTD STD: CPT | Performed by: STUDENT IN AN ORGANIZED HEALTH CARE EDUCATION/TRAINING PROGRAM

## 2024-05-17 PROCEDURE — 93325 DOPPLER ECHO COLOR FLOW MAPG: CPT

## 2024-05-17 PROCEDURE — 6370000000 HC RX 637 (ALT 250 FOR IP): Performed by: STUDENT IN AN ORGANIZED HEALTH CARE EDUCATION/TRAINING PROGRAM

## 2024-05-17 PROCEDURE — 85025 COMPLETE CBC W/AUTO DIFF WBC: CPT

## 2024-05-17 PROCEDURE — 2580000003 HC RX 258: Performed by: STUDENT IN AN ORGANIZED HEALTH CARE EDUCATION/TRAINING PROGRAM

## 2024-05-17 PROCEDURE — 93308 TTE F-UP OR LMTD: CPT | Performed by: STUDENT IN AN ORGANIZED HEALTH CARE EDUCATION/TRAINING PROGRAM

## 2024-05-17 PROCEDURE — 93010 ELECTROCARDIOGRAM REPORT: CPT | Performed by: SPECIALIST

## 2024-05-17 PROCEDURE — 80048 BASIC METABOLIC PNL TOTAL CA: CPT

## 2024-05-17 PROCEDURE — 93325 DOPPLER ECHO COLOR FLOW MAPG: CPT | Performed by: STUDENT IN AN ORGANIZED HEALTH CARE EDUCATION/TRAINING PROGRAM

## 2024-05-17 PROCEDURE — 6370000000 HC RX 637 (ALT 250 FOR IP): Performed by: INTERNAL MEDICINE

## 2024-05-17 PROCEDURE — 97161 PT EVAL LOW COMPLEX 20 MIN: CPT

## 2024-05-17 PROCEDURE — 36415 COLL VENOUS BLD VENIPUNCTURE: CPT

## 2024-05-17 PROCEDURE — 94761 N-INVAS EAR/PLS OXIMETRY MLT: CPT

## 2024-05-17 RX ORDER — ASPIRIN 81 MG/1
81 TABLET, CHEWABLE ORAL DAILY
Qty: 30 TABLET | Refills: 3 | Status: SHIPPED | OUTPATIENT
Start: 2024-05-17

## 2024-05-17 RX ORDER — GABAPENTIN 300 MG/1
300 CAPSULE ORAL 3 TIMES DAILY
Qty: 90 CAPSULE | Refills: 0 | Status: SHIPPED | OUTPATIENT
Start: 2024-05-17 | End: 2024-06-16

## 2024-05-17 RX ORDER — CLOPIDOGREL BISULFATE 75 MG/1
75 TABLET ORAL DAILY
Qty: 30 TABLET | Refills: 3 | Status: SHIPPED | OUTPATIENT
Start: 2024-05-17

## 2024-05-17 RX ORDER — ISOSORBIDE MONONITRATE 30 MG/1
30 TABLET, EXTENDED RELEASE ORAL DAILY
Qty: 30 TABLET | Refills: 3 | Status: SHIPPED | OUTPATIENT
Start: 2024-05-18 | End: 2024-06-05

## 2024-05-17 RX ORDER — NITROGLYCERIN 0.4 MG/1
0.4 TABLET SUBLINGUAL EVERY 5 MIN PRN
Qty: 5 TABLET | Refills: 1 | Status: SHIPPED | OUTPATIENT
Start: 2024-05-17 | End: 2024-05-17

## 2024-05-17 RX ORDER — ISOSORBIDE MONONITRATE 30 MG/1
30 TABLET, EXTENDED RELEASE ORAL DAILY
Qty: 30 TABLET | Refills: 3 | Status: SHIPPED | OUTPATIENT
Start: 2024-05-18 | End: 2024-05-17

## 2024-05-17 RX ORDER — NITROGLYCERIN 0.4 MG/1
0.4 TABLET SUBLINGUAL EVERY 5 MIN PRN
Qty: 5 TABLET | Refills: 1 | Status: SHIPPED | OUTPATIENT
Start: 2024-05-17

## 2024-05-17 RX ADMIN — ASPIRIN 81 MG: 81 TABLET, CHEWABLE ORAL at 10:15

## 2024-05-17 RX ADMIN — ISOSORBIDE MONONITRATE 30 MG: 30 TABLET, EXTENDED RELEASE ORAL at 10:15

## 2024-05-17 RX ADMIN — GABAPENTIN 300 MG: 300 CAPSULE ORAL at 14:52

## 2024-05-17 RX ADMIN — METOPROLOL TARTRATE 25 MG: 25 TABLET, FILM COATED ORAL at 10:15

## 2024-05-17 RX ADMIN — GABAPENTIN 300 MG: 300 CAPSULE ORAL at 10:15

## 2024-05-17 RX ADMIN — SODIUM CHLORIDE, PRESERVATIVE FREE 10 ML: 5 INJECTION INTRAVENOUS at 10:17

## 2024-05-17 RX ADMIN — CLOPIDOGREL BISULFATE 75 MG: 75 TABLET ORAL at 10:15

## 2024-05-17 NOTE — CARE COORDINATION
Care Management Discharge Note:      05/17/24 1527   Discharge Planning   Patient expects to be discharged to: Behavioral Health/Substance/Detox  ([facility] for recovery)   Services At/After Discharge   Transition of Care Consult (CM Consult) Transportation Assistance   Services At/After Discharge DME  (WC ordered)   Mode of Transport at Discharge Other (see comment)  ([employer] transportation)   Hospital Transport Time of Discharge 1700   Confirm Follow Up Transport Self     Patient with dc orders. Patient will dc back to [facility] [place] for recovery. CM sent referral to [facility] for WC, referral being reviewed. CM set up transportation with [employer] [phone] trip # [id number] with a p/u time of 5pm. CM called and updated Nata,  at [facility], [phone] as to patient dc. CM updated primary RN  and patient, as to dc plan/time.  ______________________  Destiney AN, RN  Care Management  5/17/2024  3:31 PM

## 2024-05-17 NOTE — PROGRESS NOTES
[facility] CARDIOLOGY                    Cardiology Consult  Note     []Initial Encounter     [x]Follow-up    Patient Name: Dannie James - :1960 - MRN:[id number]  Primary Cardiologist:Dr. Araujo  Consulting Cardiologist: Dr. Bhatia     Reason for encounter: chest pain     HPI:       Dannie James is a 63 y.o. male with PMH significant for recent TIA in 2024, HLPD, severe PAD with bilateral fem pops and right AKA  (Dr. Armando Torres) , renal stones, spinal stenosis and multiple lower ext DVTS,detached retina, sowmya hip replace, bilat shoulder repair, history polysubstance abuse (last use 9 mos ago . Last office echo was 2024 EF was 45% with LVH, mod tricuspid regurgitatation, mild /mod mitral regurg, mild aortic stenosis FRACISCO 1.8 cm2. Patient stated that he had and MI with stents about 3 yrs ago but, there is no [facility] of this [facility] or from cardiologists office. Stress test per office 2017 EF was 50%.  Holter 2024 baseline was sinus with heart rates 35-61 with PVCs and couplets.  Currently still smoking. Mr. James was admitted last week with chest pain, had a PCI to his LCx on 5/10 and was discharged home. Patient had sudden onset of onset of pressure like, non radiating substernal chest pain  with out any inciting, aggravating or relieving factors.     Subjective:Seen at bedside with hospitalist.  Reports neck pain central on palpation. Stated has a headache unchanged since admission. Denies chest pain, palpitation, shortness of breath.    NSTEMI/recent stent/CAD hx -cath 5/15 with stable CAD, mod distal left main with ostium of LAD, Stable severe ISR of distal lcx into pl branch          - CABG vs High risk PCI, now on imdur          -  on ASA, plavix, statin, resume BB d/t PVCs          - has follow up next week with Dr. Oliveros CT surgery   HFpEF- BNP , improving, now Echo  is unchanged from previous with mild to moderate AS, EF 50-55%,  Echos still   He reports that he does not currently use alcohol. He reports that he does not use drugs.  Family Hx: father throat cancer,  mother lung cancer  No Known Allergies       OBJECTIVE:  Wt Readings from Last 3 Encounters:   05/15/24 72.6 kg (160 lb)   05/11/24 68.7 kg (151 lb 6.4 oz)   05/17/23 71 kg (156 lb 8 oz)     I/O last 3 completed shifts:  In: -   Out: 820 [Urine:820]  No intake/output data recorded.    Physical Exam:    Vitals:   Vitals:    05/16/24 2304 05/16/24 2307 05/17/24 0244 05/17/24 0723   BP: (!) 120/59  116/61 128/72   Pulse: 73 60 72 79   Resp: 16 16 16   Temp: 98.2 °F (36.8 °C)  98.6 °F (37 °C) 98.1 °F (36.7 °C)   TempSrc: Oral  Oral Oral   SpO2: 94%  95% 94%   Weight:       Height:         Telemetry: NSR/SB with PVC, PACs    Gen: Well-developed, well-nourished, in no acute distress  Neck: Supple, No JVD,  Carotid Bruit + , no swelling, dental issues  Resp: No accessory muscle use, Clear breath sounds, No rales or rhonchi  Card: Regular Rate,Rhythm, Normal S1, S2, 2/6 systolic murmur, No rubs or gallop.   Abd:   Soft, non-tender, non-distended, BS+   MSK: No cyanosis, right AKA, poor pulse left DP   Skin: No rashes    Neuro: Moving all four extremities, follows commands appropriately, EOM, grossly intact  Psych: Anxious, oriented to person, place, alert, Nml Affect  LE: No edema    Data Review:     Radiology:   XR Results (most recent):  Xray Result (most recent):  XR CHEST PORTABLE 05/15/2024    Narrative  EXAM:  XR CHEST PORTABLE    INDICATION: cp    COMPARISON: 5/8/2024.    TECHNIQUE: Upright portable chest AP view    FINDINGS: Cardiac monitoring leads are noted. The cardiac silhouette is within  normal limits. The pulmonary vasculature is within normal limits.    The lungs and pleural spaces are clear. The visualized bones and upper abdomen  are age-appropriate.    Impression  No acute process on portable chest.      CT Result (most recent):  CTA CHEST W WO CONTRAST  05/16/2023    Narrative  Clinical history: pre-syncope, hx of DVT, rule out PE  INDICATION:   pre-syncope, hx of DVT, rule out PE  COMPARISON: None      CONTRAST: 100 ml Isovue 370  TECHNIQUE: CT of the chest with  IV contrast , Isovue-370 is performed. Axial  images from the thoracic inlet to the level of the upper abdomen are obtained.  Manual post-processing of the images and coronal reformatting is also performed.  CT dose reduction was achieved through use of a standardized protocol tailored  for this examination and automatic exposure control for dose modulation.  Multiplanar reformatted imaging was performed.  Sagittal and coronal reformatting.  3-D Postprocessing of imaging was performed.  3-D MIP reconstructed images were obtained in the coronal plane.    FINDINGS:  PE: This is a good quality study for the evaluation of pulmonary embolism to the  subsegmental arterial level. There is no pulmonary embolism to this level.  CORONARY VASCULAR CALCIFICATIONS:  Present  There is no aortic aneurysm or dissection.    There is mild cardiomegaly. There is hepatic steatosis. Dependent atelectasis.  Centrilobular emphysema. There is no pleural or pericardial effusion. There is  no mediastinal, axillary or hilar lymphadenopathy. The aorta is normal in course  and caliber. The proximal pulmonary arteries are without evidence of filling  defects. No lytic or blastic lesions are identified. The remainder of the upper  abdomen visualized is unremarkable.    Impression  There is no pulmonary embolism.  There is no aortic aneurysm or dissection.  Coronary artery disease and minimal cardiomegaly.    Mild centrilobular emphysematous change.    Incidental findings are as described above.      Recent Labs     05/17/24  0301 05/16/24  1012 05/15/24  0111   WBC 4.9 5.3 4.4   HGB 9.1* 9.7* 10.4*   HCT 27.5* 28.9* 31.6*   MCV 85.9 86.0 85.9   * 475* 464*      Lab Results   Component Value Date     (L) 05/17/2024    K 4.5  05/17/2024     05/17/2024    CO2 26 05/17/2024    BUN 23 (H) 05/17/2024    CREATININE 0.78 05/17/2024    GLUCOSE 149 (H) 05/17/2024    CALCIUM 8.5 05/17/2024    BILITOT 0.3 05/15/2024    ALKPHOS 109 05/15/2024    AST 28 05/15/2024    ALT 46 05/15/2024    LABGLOM >90 05/17/2024    GLOB 4.6 (H) 05/15/2024     Current meds:    Current Facility-Administered Medications:     isosorbide mononitrate (IMDUR) extended release tablet 30 mg, 30 mg, Oral, Daily, Tessie Parikh MD, 30 mg at 05/16/24 1244    metoprolol tartrate (LOPRESSOR) tablet 25 mg, 25 mg, Oral, BID, Ronnie Bhatia DO, 25 mg at 05/16/24 2120    enoxaparin (LOVENOX) injection 40 mg, 40 mg, SubCUTAneous, QPM, Tessie Parikh MD, 40 mg at 05/16/24 1743    ALPRAZolam (XANAX) tablet 0.25 mg, 0.25 mg, Oral, 4x Daily PRN, Tessie Parikh MD, 0.25 mg at 05/16/24 2119    sodium chloride flush 0.9 % injection 5-40 mL, 5-40 mL, IntraVENous, 2 times per day, Leia Vallejo MD, 10 mL at 05/16/24 2122    sodium chloride flush 0.9 % injection 5-40 mL, 5-40 mL, IntraVENous, PRN, Leia Valeljo MD    0.9 % sodium chloride infusion, , IntraVENous, PRN, Leia Vallejo MD    ondansetron (ZOFRAN-ODT) disintegrating tablet 4 mg, 4 mg, Oral, Q8H PRN **OR** ondansetron (ZOFRAN) injection 4 mg, 4 mg, IntraVENous, Q6H PRN, Leia Vallejo MD    polyethylene glycol (GLYCOLAX) packet 17 g, 17 g, Oral, Daily PRN, Leia Vallejo MD    acetaminophen (TYLENOL) tablet 650 mg, 650 mg, Oral, Q6H PRN **OR** acetaminophen (TYLENOL) suppository 650 mg, 650 mg, Rectal, Q6H PRN, Leia Vallejo MD    aspirin chewable tablet 81 mg, 81 mg, Oral, Daily, Leia Vallejo MD, 81 mg at 05/16/24 0931    atorvastatin (LIPITOR) tablet 40 mg, 40 mg, Oral, Nightly, Leia Vallejo MD, 40 mg at 05/16/24 2121    clopidogrel (PLAVIX) tablet 75 mg, 75 mg, Oral, Daily, Leia Vallejo MD, 75 mg at 05/16/24 0931    gabapentin (NEURONTIN) capsule 300 mg, 300 mg,

## 2024-05-17 NOTE — PROGRESS NOTES
PHYSICAL THERAPY EVALUATION/DISCHARGE    Patient: Dannie James (63 y.o. male)  Date: 5/17/2024  Primary Diagnosis: Chest pain [R07.9]  Chest pressure [R07.89]  Elevated troponin [R79.89]  NSTEMI (non-ST elevated myocardial infarction) ([facility]) [I21.4]  Procedure(s) (LRB):  Left heart cath / coronary angiography (N/A) 2 Days Post-Op   Precautions:                        ASSESSMENT AND RECOMMENDATIONS:  Based on the objective data below, the patient presents near his functional baseline but with decreased activity tolerance following admission for an NSTEMI. He has a hx of right AKA but has an ill fitting prosthetic and has utilized crutches as his primary mode of mobility. He is now undergoing a workup for a CABG d/t the extent of his CAD. During eval, he mobilized independently- supervision using his crutches over a short distance (50') but c/o strong fatigue following. Returned to EOB HR 68 BPM, SpO2 95% on RA.   The patient would benefit from a wheelchair for energy conservation and improved fall prevention in preparation for a potential CABG.      Further skilled acute physical therapy is not indicated at this time.       PLAN :  Recommendation for discharge: (in order for the patient to meet his/her long term goals): No skilled physical therapy    Other factors to consider for discharge:  lives in a [facility] house, pending CABG workup    IF patient discharges home will need the following DME: wheelchair 18 inches wide, 16 inches deep with a standard wheelchair cushion, removable desk chair arms, and swing away leg rests       SUBJECTIVE:   Patient stated “That wiped me out.”    OBJECTIVE DATA SUMMARY:     Past Medical History:   Diagnosis Date    Aortic stenosis     CAD (coronary artery disease)     Hypercholesterolemia     MI (myocardial infarction) (HCC)     PAD (peripheral artery disease) (HCC)     Spinal stenosis      Past Surgical History:   Procedure Laterality Date    ABOVE KNEE AMPUTATION      CARDIAC  belt  Speed/Sonia: Accelerated  Gait Abnormalities: Decreased step clearance                                                                                                                                                                                                                                                              [facility] AM-PAC®      Basic Mobility Inpatient Short Form (6-Clicks) Version 2  How much HELP from another person do you currently need... (If the patient hasn't done an activity recently, how much help from another person do you think they would need if they tried?) Total A Lot A Little None   1.  Turning from your back to your side while in a flat bed without using bedrails? []  1 []  2 []  3  [x]  4   2.  Moving from lying on your back to sitting on the side of a flat bed without using bedrails? []  1 []  2 []  3  [x]  4   3.  Moving to and from a bed to a chair (including a wheelchair)? []  1 []  2 [x]  3  []  4   4. Standing up from a chair using your arms (e.g. wheelchair or bedside chair)? []  1 []  2 []  3  [x]  4   5.  Walking in hospital room? []  1 []  2 []  3  [x]  4   6.  Climbing 3-5 steps with a railing? []  1 []  2 [x]  3  []  4     Raw Score: 22/24                            Cutoff score ?171,2,3 had higher odds of discharging home with home health or need of SNF/IPR.    1. Christiana Khan, Destiney Garcia, Jet Schmidt, Audrey Lance, Ganesh Payne, Gilbert Khan.  Validity of the AM-PAC “6-Clicks” Inpatient Daily Activity and Basic Mobility Short Forms. Physical Therapy Mar 2014, [phone]) 379-391; DOI: 10.2522/ptj.85718867  2. Ross FLORES, Shen RILEY, Burt RILEY, Paras J. Association of AM-PAC \"6-Clicks\" Basic Mobility and Daily Activity Scores With Discharge Destination. Phys Ther. 2021 Apr 4;101(4):[id number]. doi: 10.1093/ptj/ewrz583. PMID: 95653413.  3. Alma Delia RILEY, Casey MCCARTHY, Estelita S, Riley K, Oscar S. Activity Measure for Post-Acute Care

## 2024-05-17 NOTE — CARE COORDINATION
05/17/24 5:11 PM  Patient expressed that he has no help to get him to the pharmacy to get his medications and will not be able to get to pharmacy until Monday.      Current transport with [employer] cancelled, as they cannot add a pharmacy stop.  Spoke with rep Bond with [employer] transport, [employer] was dispatched and  is jayce Cummings--[employer] cannot cancel ride because  is already in transit.    New transportation set up with [facility].  Ride to [facility] [employer] ([address]) for pharmacy stop, [facility] will wait, and then transport to patient's home.  ETA is 6:45PM.  CM approved cost $150.     Casey RUTLEDGE updated.  Nata with [facility] made aware of change with transport time.  No questions or concerns.  HAYDEE Vergara

## 2024-05-17 NOTE — PROGRESS NOTES
0729: Bedside and Verbal shift change report given to  RN (oncoming nurse) by Amanda RUTLEDGE (offgoing nurse). Report included the following information Adult Overview, Recent Results, Med Rec Status, and Cardiac Rhythm SBrady .      1126: messaged provider advised pt advised he was suppose to get a wheelchair last time he was here and is concerned about getting around at discharge. Asked if we could get a PT eval done.     1201: messaged provider advised HR dropped to 39 and came right back up gradually advised pt was asleep but I did wake him up and he is not symptomatic. Provider advised he prob has lobo and we may need to adjust metoprolol.     1434: provider messaged advised pt echo ef was okay that she will stop metoprolol. Advised okay. Will check status of wheelchair and transportation. Spoke with  she advised wheelchair auth sent to insurance and will be delivered. She advised will set up transportation.    1644: went in to complete patient discharge however pt advised the pharmacy listed was incorrect and that he would not have any ride to get his prescriptions for a few days. Advised will reach out to the provider due to this, because he would need the meds. Called provider she advised she will call the nursing supervisor to figure out what we could do to get him to the pharmacy since the  is gone     1704: provider called back advised nursing supervisor Belia is coming to floor to speak with transportation to see if they can get the patient to the pharmacy on the way back to his house. She advised either way pt is clear to go today. That she spoke with the patient pharmacy and they are working to get them filled now and that they do offer delivery however not until Monday.     1717:  Belia called me advised that the [employer] transport advised that they would not be able to take the patient to the pharmacy. So they attempted to cancel that transport but could not (they

## 2024-05-17 NOTE — CARE COORDINATION
Care Management Progress Note:   Per IDR, Echo pending. Patient will dc back to [facility] once medically stable, patient has transportation with [employer] insurance. Patient with order for WC, CM sent to [employer] ([place]) for review. CM following for needs.  ______________________  Destiney AN, RN  Care Management  5/17/2024  2:30 PM

## 2024-05-18 NOTE — PROGRESS NOTES
Physician Progress Note      PATIENT:               KEYONNA URIBE  CSN #:                  [id number]  :                       1960  ADMIT DATE:       5/15/2024 12:57 AM  DISCH DATE:        2024 7:38 PM  RESPONDING  PROVIDER #:        Tessie Parikh MD          QUERY TEXT:    Good Afternoon    This patient admitted on 05/15/2024- for NSTEMI.    The progress notes 2024- \"NSTEMI s/p Cath\"    ? If possible, can you please further clarify the \"s/p Cath\", and if you mean   that the NSTEMI was due to the recent cardiac Cath, and if so, please document   in progress notes and discharge summary the relationship if any between the   NSTEMI and the surgery:    The medical record reflects the following:  Risk Factors: CAD, Recent cardiac cath with PCI  Clinical Indicators: TO ER with chest pain recent Cath and   PCI,---Progress notes ---\"NSTEMI s/p Cath\"  Treatment: Cardiology, ASA< Plavix, Cardiac cath, Recommending CABG eval.    Thank you  MARY SamuelsN,RN, CPHQ, CCDS, SMART  Options provided:  -- NSTEMI is due to the procedure  -- NSTEMI  is not due to the procedure, but is due to other incidental risk   factor, Please specify other incidental risk factor.  -- Other - I will add my own diagnosis  -- Disagree - Not applicable / Not valid  -- Disagree - Clinically unable to determine / Unknown  -- Refer to Clinical Documentation Reviewer    PROVIDER RESPONSE TEXT:    NSTEMI is not due to the procedure but due to    Query created by: Shyanne Wong on 2024 2:41 PM      Electronically signed by:  Tessie Parikh MD 2024 7:13 AM

## 2024-05-23 ENCOUNTER — INITIAL CONSULT (OUTPATIENT)
Age: 64
End: 2024-05-23
Payer: MEDICARE

## 2024-05-23 VITALS
OXYGEN SATURATION: 95 % | DIASTOLIC BLOOD PRESSURE: 76 MMHG | BODY MASS INDEX: 19.3 KG/M2 | TEMPERATURE: 98.1 F | WEIGHT: 138.4 LBS | HEART RATE: 88 BPM | SYSTOLIC BLOOD PRESSURE: 113 MMHG

## 2024-05-23 DIAGNOSIS — I21.4 NSTEMI (NON-ST ELEVATED MYOCARDIAL INFARCTION) (HCC): Primary | ICD-10-CM

## 2024-05-23 PROCEDURE — 99205 OFFICE O/P NEW HI 60 MIN: CPT | Performed by: THORACIC SURGERY (CARDIOTHORACIC VASCULAR SURGERY)

## 2024-05-23 PROCEDURE — 1111F DSCHRG MED/CURRENT MED MERGE: CPT | Performed by: THORACIC SURGERY (CARDIOTHORACIC VASCULAR SURGERY)

## 2024-05-23 PROCEDURE — 1036F TOBACCO NON-USER: CPT | Performed by: THORACIC SURGERY (CARDIOTHORACIC VASCULAR SURGERY)

## 2024-05-23 PROCEDURE — G8428 CUR MEDS NOT DOCUMENT: HCPCS | Performed by: THORACIC SURGERY (CARDIOTHORACIC VASCULAR SURGERY)

## 2024-05-23 PROCEDURE — 3017F COLORECTAL CA SCREEN DOC REV: CPT | Performed by: THORACIC SURGERY (CARDIOTHORACIC VASCULAR SURGERY)

## 2024-05-23 PROCEDURE — 1123F ACP DISCUSS/DSCN MKR DOCD: CPT | Performed by: THORACIC SURGERY (CARDIOTHORACIC VASCULAR SURGERY)

## 2024-05-23 PROCEDURE — G8420 CALC BMI NORM PARAMETERS: HCPCS | Performed by: THORACIC SURGERY (CARDIOTHORACIC VASCULAR SURGERY)

## 2024-05-23 NOTE — PROGRESS NOTES
Name: Dannie James   : 1960   Home Phone: [phone]     Reviewed record in preparation for visit and have obtained necessary documentation. Identified pt with two pt identifiers (name and ).     1. Have you been to the ER, urgent care clinic since your last visit?  Hospitalized since your last visit?No    2. Have you seen or consulted any other health care providers outside of the [facility] since your last visit?  Include any pap smears or colon screening. No      Dannie James is being seen for CABG consult .       Patient weight flagged during examination. RN re-checked patient weight and was found to be accurate.     Current Medications-Reviewed with Patient    Allergies-Reviewed with Patient      Vitals  /76 (Site: Left Upper Arm, Position: Sitting, Cuff Size: Medium Adult)   Pulse 88   Temp 98.1 °F (36.7 °C) (Oral)   Wt 62.8 kg (138 lb 6.4 oz)   SpO2 95%   BMI 19.30 kg/m²           
the office, not on BB.   PVCs: had been on BB, but pt reports he is not taking BB now.   Severe PAD: s/p bilat fem op. On ASA, plavix. Pt reports  [facility] [place] [place] is his Vascular surgeon.   Hyperthyroid: TSH < 0.01 T4, free 5.5. Enlarged thyroid gland. On no meds. Do NOT give Amiodarone   Recent TIA: April 2024. On ASA, plavix.   Emphysema: noted on CTA Chest 5/16/23, would need PFTs if plan for surgery.   Spinal stenosis  Right AKA: ill fitting prosthetic. Uses crutches as primary mode of mobility. Significant mobility issues.   H/o multiple LE DVTs: would need vein mapping if plan for CABG  Detached retina   Bilat hip replacement  Bilat shoulder repair  H/o polysubstance abuse: cocaine, methamphetamines. Sober 11 months.   Neuropathy: On gabapentin 300 TID  Mood disorder: On cymbalta and Seroquel   Malnutrition: Pt reports weight loss, poor appetite. Would recommend ensure high protein prior to surgery.       Time spent: 60 minutes      Signed By: SOURAV Ogden - NP     May 23, 2024

## 2024-05-28 ENCOUNTER — TELEPHONE (OUTPATIENT)
Age: 64
End: 2024-05-28

## 2024-05-28 NOTE — TELEPHONE ENCOUNTER
Voicemail received from patient stating he was expecting a call from our office regarding scheduling surgery.  He was seen by Dr. Oliveros for an office consult on 5/23/24.    His call back number is [phone].

## 2024-05-29 ENCOUNTER — TELEPHONE (OUTPATIENT)
Age: 64
End: 2024-05-29

## 2024-05-29 NOTE — TELEPHONE ENCOUNTER
Patient called to ask about possible stents that dr giles are to do, no orders on file per Kirsten MARTIN Please advise

## 2024-05-31 NOTE — PROGRESS NOTES
Physician Progress Note      PATIENT:               KEYONNA URIBE  CSN #:                  [id number]  :                       1960  ADMIT DATE:       5/15/2024 12:57 AM  DISCH DATE:        2024 7:38 PM  RESPONDING  PROVIDER #:        Tessie Parikh MD          QUERY TEXT:    Good Morning    This patient admitted on 05/15/2024- 2024 for NSTEMI    Cath notes this procedure \" Prox LAD lesion 70% stenosed. The lesion was   previously treated. LPDA with 80% stenosis this lesion was previously treated   using a stent .\"    If possible, please document in progress notes and discharge summary the   relationship, if any, between In-stent stenosis and NSTEMI.    The medical record reflects the following:  Risk Factors: CAD s/p Mx. Stents  Clinical Indicators:  Presented wtih Chest discomfort, Sudden onset of   pressure like non-radiating.  Troponin 744--938--and up to 1365---Cards notes   recurrent disease with known residual disease in  LAD and recent sent in the   circumflex. Mild to moderate AS with no CHF and preserved LV function  Treatment: Cardiology, Left heart Cath.    Thank you  JUVE Samuels,RN, CPHQ, CCDS, SMART  Options provided:  -- NSTEMI due to In-stent stenosis  -- NSTEMI is unrelated to in-stent stenosis  -- Other - I will add my own diagnosis  -- Disagree - Not applicable / Not valid  -- Disagree - Clinically unable to determine / Unknown  -- Refer to Clinical Documentation Reviewer    PROVIDER RESPONSE TEXT:    This patient has In-Stent stenosis due to ***.    Query created by: Shyanne Wong on 2024 7:26 AM      Electronically signed by:  Tessie Parikh MD 2024 3:09 PM

## 2024-05-31 NOTE — DISCHARGE SUMMARY
Physician Discharge Summary     Patient ID:  Dannie James  [id number]  63 y.o.  1960    Admit date: 5/15/2024    Discharge date and time: 5/17/2024    Admission Diagnoses: Chest pain [R07.9]  Chest pressure [R07.89]  Elevated troponin [R79.89]  NSTEMI (non-ST elevated myocardial infarction) (HCC) [I21.4]    Discharge Diagnoses/Hospital Course   Mr. James is a 62 yo male with PMH of known CAD admitted for NSTEMI and chest pain. He is s/p cardiac cath that showed:     L Main:large caliber, mild distal left main disease by ivus  LAD:large caliber, ostial lad 50-60% stenosis (IVUS mla 5.8mm2), mid-vessel 60-70% stenosis (ivus mla 4.5mm2), small diagonals with diffuse mild to moderate disease  LCx: large caliber, dominant, proximal lcx widely patent,  moderate om with mild ostial stenopsis with diffuse disease and anastasia 3 flow, distal Lcx into l-pl branch with long stent, mild isr within distal lcx with severe >80-90% fibrotic ISR within proximal l-pl branch  RCA: non-dominant    He will need close follow-up with cardiology and f/u with CTS. He is on DAPT with ASA/plavix. He remains on a statin and metoprolol. Imdur added. Can consider Ranexa if symptoms persistent. Limited TTE showing EF of 50-55%     He has an event monitor that will be activated at discharge to eval for palpitations.     Low TSH. Elevated T4 - appreciate endocrinology and noted to be subacute thyroiditis.     PCP: Lashell Shirley MD     Consults: cardiology    Discharge Exam:  Vitals:    05/17/24 1006 05/17/24 1128 05/17/24 1500 05/17/24 1547   BP: 128/72 (!) 117/56  109/72   Pulse:  75 80 66   Resp:  14  16   Temp:  98.4 °F (36.9 °C)  97.9 °F (36.6 °C)   TempSrc:  Oral  Oral   SpO2:  94%  96%   Weight: 72.6 kg (160 lb)      Height: 1.803 m (5' 11\")         Gen:  Well-developed, well-nourished, in no acute distress  HEENT:  Pink conjunctivae, PERRL, hearing intact to voice, moist mucous membranes  Neck:  Supple, without masses, thyroid

## 2024-06-03 NOTE — PROGRESS NOTES
Physician Progress Note      PATIENT:               KEYONNA URIBE  CSN #:                  [id number]  :                       1960  ADMIT DATE:       5/15/2024 12:57 AM  DISCH DATE:        2024 7:38 PM  RESPONDING  PROVIDER #:        Tessie Parikh MD          QUERY TEXT:    Good Morning    My apologies, Dr. Gonsalves, Due to a portion of the query response being left   off with the initial query sent, I am needing to correct the typo, and resend   this .  I'm sorry for this inconvenience.  ____________________________    This patient admitted on 05/15/2024- 2024 for NSTEMI    Cath notes this procedure \" Prox LAD lesion 70% stenosed. The lesion was   previously treated. LPDA with 80% stenosis this lesion was previously treated   using a stent .\"    If possible, please document in progress notes and discharge summary the   relationship, if any, between In-stent stenosis and NSTEMI.    The medical record reflects the following:  Risk Factors: CAD s/p Mx. Stents  Clinical Indicators:  Presented with Chest discomfort, Sudden onset of   pressure like non-radiating.  Troponin 744--938--and up to 1365---Cards notes   recurrent disease with known residual disease in  LAD and recent sent in the   circumflex. Mild to moderate AS with no CHF and preserved LV function  Treatment: Cardiology, Left heart Cath.    Thank you  JUVE Samuels,RN, CPHQ, CCDS, SMART  Options provided:  -- NSTEMI due to In-stent stenosis  -- NSTEMI is unrelated to in-stent stenosis  -- Other - I will add my own diagnosis  -- Disagree - Not applicable / Not valid  -- Disagree - Clinically unable to determine / Unknown  -- Refer to Clinical Documentation Reviewer    PROVIDER RESPONSE TEXT:    NSTEMI is due to the in-stent stenosis.    Query created by: Shyanne Wong on 6/3/2024 7:13 AM      Electronically signed by:  Tessie Parikh MD 6/3/2024 7:28 AM

## 2024-06-05 ENCOUNTER — OFFICE VISIT (OUTPATIENT)
Age: 64
End: 2024-06-05
Payer: MEDICARE

## 2024-06-05 VITALS
SYSTOLIC BLOOD PRESSURE: 120 MMHG | DIASTOLIC BLOOD PRESSURE: 62 MMHG | OXYGEN SATURATION: 99 % | BODY MASS INDEX: 19.3 KG/M2 | HEIGHT: 71 IN | HEART RATE: 54 BPM

## 2024-06-05 DIAGNOSIS — Z01.818 PRE-OP TESTING: Primary | ICD-10-CM

## 2024-06-05 DIAGNOSIS — E06.9 THYROIDITIS: ICD-10-CM

## 2024-06-05 DIAGNOSIS — I73.9 PAD (PERIPHERAL ARTERY DISEASE) (HCC): ICD-10-CM

## 2024-06-05 DIAGNOSIS — I49.3 PVC'S (PREMATURE VENTRICULAR CONTRACTIONS): ICD-10-CM

## 2024-06-05 DIAGNOSIS — I25.118 CORONARY ARTERY DISEASE OF NATIVE ARTERY OF NATIVE HEART WITH STABLE ANGINA PECTORIS (HCC): Primary | ICD-10-CM

## 2024-06-05 PROCEDURE — 99214 OFFICE O/P EST MOD 30 MIN: CPT | Performed by: STUDENT IN AN ORGANIZED HEALTH CARE EDUCATION/TRAINING PROGRAM

## 2024-06-05 RX ORDER — ISOSORBIDE MONONITRATE 30 MG/1
30 TABLET, EXTENDED RELEASE ORAL DAILY
Qty: 90 TABLET | Refills: 3 | Status: SHIPPED | OUTPATIENT
Start: 2024-06-05

## 2024-06-05 NOTE — PROGRESS NOTES
Chief Complaint   Patient presents with    Chest Pain    Other     [place]  NSTEMI     Vitals:    06/05/24 1042   BP: 120/62   Site: Left Upper Arm   Position: Sitting   Pulse: 54   SpO2: 99%   Height: 1.803 m (5' 11\")     Chest pain: DENIED     Recent hospital stays: DENIED     Refills: DENIED     FATIGUE/SOB

## 2024-06-05 NOTE — PROGRESS NOTES
[place]  [address] [facility], [address]  [place] [address]    Office [phone],Fax [phone]           Dannie James is a 63 y.o. male presents to the office for f/up visit      Assessment/Recommendations:     Diagnosis Orders   1. Coronary artery disease of native artery of native heart with stable angina pectoris ([facility])        2. Thyroiditis  Amb External Referral To Endocrinology      3. PAD (peripheral artery disease) ([facility])        4. PVC's (premature ventricular contractions)            CAD.hx of prior pci.  Left dominant system.  Cath 5/24 in the setting of significant ventricular ectopy showed severe av groove lcx stenosis along with severe ISR within more distal lcx.  Distal lcx ISR with evidence of very fibrotic stenosis that did not amend to high inflation NC balloon along with cutting balloon.  S/p pci of mid-lcx.  Residual proximal lad stenosis.  Recently evaluated by CT surgery who deferred surgery due to concern for ability to rehab and maintain sternal precautions with AKA.    - cont dapt  - cont high dose statin therapy  - plan to proceed staged pci within the near future    PVCs, hx of bradycardia.  Unable to tolerate BB therapy    PAD.  severe PAD with bilateral fem pops and right AKA 2020 (Dr. Armando Torres)     HLD    TIA    Hx of DVTs    Polysubstance abuse    Recent thyroiditis- f/up endocrinology          Primary Care Physician- Lashell Shirley MD    Follow-up 4-6 weeks        Subjective:   No ongoing chest pain.  No dyspnea.  No adverse bleeding with dapt      Past Medical History:   Diagnosis Date    Aortic stenosis     CAD (coronary artery disease)     Hypercholesterolemia     MI (myocardial infarction) ([facility])     PAD (peripheral artery disease) ([facility])     Spinal stenosis         Past Surgical History:   Procedure Laterality Date    ABOVE KNEE AMPUTATION      CARDIAC PROCEDURE N/A 5/10/2024    Left heart cath / coronary angiography performed by

## 2024-06-05 NOTE — H&P (VIEW-ONLY)
[place]  [address] [facility] [address], [address]  [place] [address]    Office [phone],Fax [phone]           Dannie James is a 63 y.o. male presents to the office for f/up visit      Assessment/Recommendations:     Diagnosis Orders   1. Coronary artery disease of native artery of native heart with stable angina pectoris ([facility])        2. Thyroiditis  Amb External Referral To Endocrinology      3. PAD (peripheral artery disease) ([facility])        4. PVC's (premature ventricular contractions)            CAD.hx of prior pci.  Left dominant system.  Cath 5/24 in the setting of significant ventricular ectopy showed severe av groove lcx stenosis along with severe ISR within more distal lcx.  Distal lcx ISR with evidence of very fibrotic stenosis that did not amend to high inflation NC balloon along with cutting balloon.  S/p pci of mid-lcx.  Residual proximal lad stenosis.  Recently evaluated by CT surgery who deferred surgery due to concern for ability to rehab and maintain sternal precautions with AKA.    - cont dapt  - cont high dose statin therapy  - plan to proceed staged pci within the near future    PVCs, hx of bradycardia.  Unable to tolerate BB therapy    PAD.  severe PAD with bilateral fem pops and right AKA 2020 (Dr. Armando Torres)     HLD    TIA    Hx of DVTs    Polysubstance abuse    Recent thyroiditis- f/up endocrinology          Primary Care Physician- Lashell Shirley MD    Follow-up 4-6 weeks        Subjective:   No ongoing chest pain.  No dyspnea.  No adverse bleeding with dapt      Past Medical History:   Diagnosis Date    Aortic stenosis     CAD (coronary artery disease)     Hypercholesterolemia     MI (myocardial infarction) ([facility])     PAD (peripheral artery disease) ([facility])     Spinal stenosis         Past Surgical History:   Procedure Laterality Date    ABOVE KNEE AMPUTATION      CARDIAC PROCEDURE N/A 5/10/2024    Left heart cath / coronary angiography performed by

## 2024-06-10 ENCOUNTER — TELEPHONE (OUTPATIENT)
Age: 64
End: 2024-06-10

## 2024-06-10 DIAGNOSIS — I25.118 CORONARY ARTERY DISEASE OF NATIVE ARTERY OF NATIVE HEART WITH STABLE ANGINA PECTORIS (HCC): Primary | ICD-10-CM

## 2024-06-10 PROBLEM — I25.10 CAD (CORONARY ARTERY DISEASE): Status: ACTIVE | Noted: 2024-06-10

## 2024-06-10 NOTE — TELEPHONE ENCOUNTER
Spoke with Pt of PCI of LAD schd. For 6/27/24 At 9:15am arrive at 7:45am Pt aware that they need a  they must stay on site NPO from Midnight the night before. Check in at the second floor Outpt. Reg. Desk. Pt is to have Labs done between 6/10/24-6/20/24.   Medications:  Ok to take   VO by /nurse Lisa FLORES

## 2024-06-11 ENCOUNTER — DIRECT ADMIT ORDERS (OUTPATIENT)
Age: 64
End: 2024-06-11

## 2024-06-11 DIAGNOSIS — I25.10 CAD (CORONARY ARTERY DISEASE): Primary | ICD-10-CM

## 2024-06-11 RX ORDER — SODIUM CHLORIDE 0.9 % (FLUSH) 0.9 %
5-40 SYRINGE (ML) INJECTION EVERY 12 HOURS SCHEDULED
OUTPATIENT
Start: 2024-06-24

## 2024-06-11 RX ORDER — SODIUM CHLORIDE 0.9 % (FLUSH) 0.9 %
5-40 SYRINGE (ML) INJECTION PRN
OUTPATIENT
Start: 2024-06-24

## 2024-06-11 RX ORDER — SODIUM CHLORIDE 9 MG/ML
INJECTION, SOLUTION INTRAVENOUS PRN
OUTPATIENT
Start: 2024-06-24

## 2024-06-21 ENCOUNTER — TELEPHONE (OUTPATIENT)
Age: 64
End: 2024-06-21

## 2024-06-21 NOTE — TELEPHONE ENCOUNTER
R/T Pt call no answer and the mailbox is full to let him know that he has to get the labs done by 6/25/24

## 2024-06-21 NOTE — TELEPHONE ENCOUNTER
Patient states he will be getting the surgery done he has found a ride, and he wants to know if he can still get labs done.    Phone [phone]

## 2024-06-24 DIAGNOSIS — Z01.818 PRE-OP TESTING: ICD-10-CM

## 2024-06-24 LAB
ANION GAP SERPL CALC-SCNC: 3 MMOL/L (ref 5–15)
BUN SERPL-MCNC: 15 MG/DL (ref 6–20)
BUN/CREAT SERPL: 15 (ref 12–20)
CALCIUM SERPL-MCNC: 9.3 MG/DL (ref 8.5–10.1)
CHLORIDE SERPL-SCNC: 104 MMOL/L (ref 97–108)
CO2 SERPL-SCNC: 29 MMOL/L (ref 21–32)
CREAT SERPL-MCNC: 0.98 MG/DL (ref 0.7–1.3)
ERYTHROCYTE [DISTWIDTH] IN BLOOD BY AUTOMATED COUNT: 15.7 % (ref 11.5–14.5)
GLUCOSE SERPL-MCNC: 98 MG/DL (ref 65–100)
HCT VFR BLD AUTO: 38.7 % (ref 36.6–50.3)
HGB BLD-MCNC: 12 G/DL (ref 12.1–17)
MCH RBC QN AUTO: 27.1 PG (ref 26–34)
MCHC RBC AUTO-ENTMCNC: 31 G/DL (ref 30–36.5)
MCV RBC AUTO: 87.6 FL (ref 80–99)
NRBC # BLD: 0 K/UL (ref 0–0.01)
NRBC BLD-RTO: 0 PER 100 WBC
PLATELET # BLD AUTO: 390 K/UL (ref 150–400)
PMV BLD AUTO: 9.2 FL (ref 8.9–12.9)
POTASSIUM SERPL-SCNC: 4.4 MMOL/L (ref 3.5–5.1)
RBC # BLD AUTO: 4.42 M/UL (ref 4.1–5.7)
SODIUM SERPL-SCNC: 136 MMOL/L (ref 136–145)
WBC # BLD AUTO: 4.4 K/UL (ref 4.1–11.1)

## 2024-06-26 NOTE — TELEPHONE ENCOUNTER
Spoke To Pt:  Just a reminder not to forget your PCI scheduled for tomorrow.  Arriving at 7:45am  You will need a  must stay on site  NPO from midnight   check in at the 2nd floor outpt. reg. Desk.  Medications:  Ok to take  VO by Dr. Bhatia/nurse Lisa LEONARD

## 2024-06-27 ENCOUNTER — HOSPITAL ENCOUNTER (OUTPATIENT)
Facility: HOSPITAL | Age: 64
Setting detail: OUTPATIENT SURGERY
Discharge: HOME | End: 2024-06-27
Attending: STUDENT IN AN ORGANIZED HEALTH CARE EDUCATION/TRAINING PROGRAM | Admitting: STUDENT IN AN ORGANIZED HEALTH CARE EDUCATION/TRAINING PROGRAM
Payer: MEDICARE

## 2024-06-27 VITALS
SYSTOLIC BLOOD PRESSURE: 137 MMHG | DIASTOLIC BLOOD PRESSURE: 64 MMHG | HEART RATE: 44 BPM | RESPIRATION RATE: 15 BRPM | HEIGHT: 71 IN | BODY MASS INDEX: 20.3 KG/M2 | OXYGEN SATURATION: 97 % | WEIGHT: 145 LBS | TEMPERATURE: 97.7 F

## 2024-06-27 DIAGNOSIS — I25.10 CAD (CORONARY ARTERY DISEASE): ICD-10-CM

## 2024-06-27 DIAGNOSIS — G62.9 NEUROPATHY: ICD-10-CM

## 2024-06-27 LAB
ECHO BSA: 1.82 M2
EKG ATRIAL RATE: 39 BPM
EKG DIAGNOSIS: NORMAL
EKG P AXIS: 67 DEGREES
EKG P-R INTERVAL: 166 MS
EKG Q-T INTERVAL: 544 MS
EKG QRS DURATION: 80 MS
EKG QTC CALCULATION (BAZETT): 437 MS
EKG R AXIS: -27 DEGREES
EKG T AXIS: 32 DEGREES
EKG VENTRICULAR RATE: 39 BPM

## 2024-06-27 PROCEDURE — 92978 ENDOLUMINL IVUS OCT C 1ST: CPT | Performed by: STUDENT IN AN ORGANIZED HEALTH CARE EDUCATION/TRAINING PROGRAM

## 2024-06-27 PROCEDURE — 6360000002 HC RX W HCPCS: Performed by: STUDENT IN AN ORGANIZED HEALTH CARE EDUCATION/TRAINING PROGRAM

## 2024-06-27 PROCEDURE — C9600 PERC DRUG-EL COR STENT SING: HCPCS | Performed by: STUDENT IN AN ORGANIZED HEALTH CARE EDUCATION/TRAINING PROGRAM

## 2024-06-27 PROCEDURE — 85347 COAGULATION TIME ACTIVATED: CPT | Performed by: STUDENT IN AN ORGANIZED HEALTH CARE EDUCATION/TRAINING PROGRAM

## 2024-06-27 PROCEDURE — 92979 ENDOLUMINL IVUS OCT C EA: CPT | Performed by: STUDENT IN AN ORGANIZED HEALTH CARE EDUCATION/TRAINING PROGRAM

## 2024-06-27 PROCEDURE — 99152 MOD SED SAME PHYS/QHP 5/>YRS: CPT | Performed by: STUDENT IN AN ORGANIZED HEALTH CARE EDUCATION/TRAINING PROGRAM

## 2024-06-27 PROCEDURE — C1725 CATH, TRANSLUMIN NON-LASER: HCPCS | Performed by: STUDENT IN AN ORGANIZED HEALTH CARE EDUCATION/TRAINING PROGRAM

## 2024-06-27 PROCEDURE — C1753 CATH, INTRAVAS ULTRASOUND: HCPCS | Performed by: STUDENT IN AN ORGANIZED HEALTH CARE EDUCATION/TRAINING PROGRAM

## 2024-06-27 PROCEDURE — 93454 CORONARY ARTERY ANGIO S&I: CPT | Performed by: STUDENT IN AN ORGANIZED HEALTH CARE EDUCATION/TRAINING PROGRAM

## 2024-06-27 PROCEDURE — 6370000000 HC RX 637 (ALT 250 FOR IP): Performed by: STUDENT IN AN ORGANIZED HEALTH CARE EDUCATION/TRAINING PROGRAM

## 2024-06-27 PROCEDURE — 99153 MOD SED SAME PHYS/QHP EA: CPT | Performed by: STUDENT IN AN ORGANIZED HEALTH CARE EDUCATION/TRAINING PROGRAM

## 2024-06-27 PROCEDURE — 2709999900 HC NON-CHARGEABLE SUPPLY: Performed by: STUDENT IN AN ORGANIZED HEALTH CARE EDUCATION/TRAINING PROGRAM

## 2024-06-27 PROCEDURE — 93571 IV DOP VEL&/PRESS C FLO 1ST: CPT | Performed by: STUDENT IN AN ORGANIZED HEALTH CARE EDUCATION/TRAINING PROGRAM

## 2024-06-27 PROCEDURE — C1769 GUIDE WIRE: HCPCS | Performed by: STUDENT IN AN ORGANIZED HEALTH CARE EDUCATION/TRAINING PROGRAM

## 2024-06-27 PROCEDURE — 6360000004 HC RX CONTRAST MEDICATION: Performed by: STUDENT IN AN ORGANIZED HEALTH CARE EDUCATION/TRAINING PROGRAM

## 2024-06-27 PROCEDURE — C1874 STENT, COATED/COV W/DEL SYS: HCPCS | Performed by: STUDENT IN AN ORGANIZED HEALTH CARE EDUCATION/TRAINING PROGRAM

## 2024-06-27 PROCEDURE — 93005 ELECTROCARDIOGRAM TRACING: CPT | Performed by: STUDENT IN AN ORGANIZED HEALTH CARE EDUCATION/TRAINING PROGRAM

## 2024-06-27 PROCEDURE — 2500000003 HC RX 250 WO HCPCS: Performed by: STUDENT IN AN ORGANIZED HEALTH CARE EDUCATION/TRAINING PROGRAM

## 2024-06-27 PROCEDURE — C1887 CATHETER, GUIDING: HCPCS | Performed by: STUDENT IN AN ORGANIZED HEALTH CARE EDUCATION/TRAINING PROGRAM

## 2024-06-27 DEVICE — STENT ONYXNG35012UX ONYX 3.50X12RX
Type: IMPLANTABLE DEVICE | Site: HEART | Status: FUNCTIONAL
Brand: ONYX FRONTIER™

## 2024-06-27 RX ORDER — LIDOCAINE HYDROCHLORIDE 10 MG/ML
INJECTION, SOLUTION INFILTRATION; PERINEURAL PRN
Status: DISCONTINUED | OUTPATIENT
Start: 2024-06-27 | End: 2024-06-27 | Stop reason: HOSPADM

## 2024-06-27 RX ORDER — SODIUM CHLORIDE 0.9 % (FLUSH) 0.9 %
5-40 SYRINGE (ML) INJECTION EVERY 12 HOURS SCHEDULED
Status: DISCONTINUED | OUTPATIENT
Start: 2024-06-27 | End: 2024-06-27 | Stop reason: HOSPADM

## 2024-06-27 RX ORDER — CLOPIDOGREL 300 MG/1
TABLET, FILM COATED ORAL PRN
Status: DISCONTINUED | OUTPATIENT
Start: 2024-06-27 | End: 2024-06-27 | Stop reason: HOSPADM

## 2024-06-27 RX ORDER — MIDAZOLAM HYDROCHLORIDE 1 MG/ML
INJECTION INTRAMUSCULAR; INTRAVENOUS PRN
Status: DISCONTINUED | OUTPATIENT
Start: 2024-06-27 | End: 2024-06-27 | Stop reason: HOSPADM

## 2024-06-27 RX ORDER — FENTANYL CITRATE 50 UG/ML
INJECTION, SOLUTION INTRAMUSCULAR; INTRAVENOUS PRN
Status: DISCONTINUED | OUTPATIENT
Start: 2024-06-27 | End: 2024-06-27 | Stop reason: HOSPADM

## 2024-06-27 RX ORDER — SODIUM CHLORIDE 9 MG/ML
INJECTION, SOLUTION INTRAVENOUS PRN
Status: DISCONTINUED | OUTPATIENT
Start: 2024-06-27 | End: 2024-06-27 | Stop reason: HOSPADM

## 2024-06-27 RX ORDER — PHENYLEPHRINE HCL IN 0.9% NACL 0.4MG/10ML
SYRINGE (ML) INTRAVENOUS PRN
Status: DISCONTINUED | OUTPATIENT
Start: 2024-06-27 | End: 2024-06-27 | Stop reason: HOSPADM

## 2024-06-27 RX ORDER — ACETAMINOPHEN 325 MG/1
650 TABLET ORAL EVERY 4 HOURS PRN
Status: DISCONTINUED | OUTPATIENT
Start: 2024-06-27 | End: 2024-06-27 | Stop reason: HOSPADM

## 2024-06-27 RX ORDER — HEPARIN SODIUM 200 [USP'U]/100ML
INJECTION, SOLUTION INTRAVENOUS CONTINUOUS PRN
Status: COMPLETED | OUTPATIENT
Start: 2024-06-27 | End: 2024-06-27

## 2024-06-27 RX ORDER — VERAPAMIL HYDROCHLORIDE 2.5 MG/ML
INJECTION, SOLUTION INTRAVENOUS PRN
Status: DISCONTINUED | OUTPATIENT
Start: 2024-06-27 | End: 2024-06-27 | Stop reason: HOSPADM

## 2024-06-27 RX ORDER — SODIUM CHLORIDE 0.9 % (FLUSH) 0.9 %
5-40 SYRINGE (ML) INJECTION PRN
Status: DISCONTINUED | OUTPATIENT
Start: 2024-06-27 | End: 2024-06-27 | Stop reason: HOSPADM

## 2024-06-27 RX ORDER — HEPARIN SODIUM 1000 [USP'U]/ML
INJECTION, SOLUTION INTRAVENOUS; SUBCUTANEOUS PRN
Status: DISCONTINUED | OUTPATIENT
Start: 2024-06-27 | End: 2024-06-27 | Stop reason: HOSPADM

## 2024-06-27 RX ADMIN — ACETAMINOPHEN 650 MG: 325 TABLET ORAL at 11:33

## 2024-06-27 ASSESSMENT — PAIN DESCRIPTION - LOCATION: LOCATION: CHEST

## 2024-06-27 ASSESSMENT — PAIN DESCRIPTION - DESCRIPTORS: DESCRIPTORS: OTHER (COMMENT)

## 2024-06-27 NOTE — PROGRESS NOTES
4:15 pm  Pt discharged via wheelchair with friendBola. Personal belongings with patient upon discharge.

## 2024-06-27 NOTE — INTERVAL H&P NOTE
Update History & Physical        Plan: The risks, benefits, expected outcome, and alternative to the recommended procedure have been discussed with the patient. Patient understands and wants to proceed with the procedure.     Electronically signed by Ronnie Bhatia DO on 6/27/2024 at 3:52 PM

## 2024-06-27 NOTE — PROCEDURES
PROCEDURE NOTE  Date: 6/27/2024   Name: Dannie James  YOB: 1960    Procedures    BRIEF PROCEDURE NOTE    Date of Procedure: 6/27/2024   Preoperative Diagnosis: stable angina  Postoperative Diagnosis: ostial las stenosis    Procedure: PCi w/ andrews, ivus lad, ivus lcx, iFR lad, moderate sedation  Interventional Cardiologist: Ronnie Bhatia DO  Assistant: None  Anesthesia: local + IV moderate sedation   I administered moderate sedation throughout this procedure. An independent trained observer pushed medications at my direction, and monitored the patient’s level of consciousness and physiological status throughout.  Estimated Blood Loss: Minimal    Access: right radial artery, 6F  Catheters:  Left coronary: ebu 3.5 6F guide      PCI:  Ebu 3.5 6F guide  Gonsalo blue lad  Ivus showed ostial lad with MLA <4mm2  Dilated with 3.0 nc balloon  Proximal lad into left main 3.5x12mm leighton andrews  POT 4.0 nc balloon  Wired lcx with gonsalo balloon  Kissing balloon with 3.5 in lad and 3.0 in lcx  Ivus lcx and lad post-stent implantation  iFR into lad 0.91 (did not return to 1.0, likely iFR > 0.91)  deferred pci of moderate mid-LAD stenosis       Specimens Removed: None    Implants: n/a    Closure Device: radial TR band    See full cath note.    Complications: no significant, transient hypotension related to sedation requiring neosynephrine      Findings:  1. Ostial lad with mla < 4mms treated with ANDREWS  2. Moderate proximal into mid-lad stenosis that was not significant by iFR    Plan:    Dapt  Statin therapy    [place]  [address] [facility] [facility], [address]  [place] [address]                                              Office [phone],Fax [phone]

## 2024-06-27 NOTE — PROGRESS NOTES
8:15 am  Patient arrived. ID and allergies verified verbally with patient. Pt voices understanding of procedure to be performed. Consent obtained. Pt prepped for procedure. Pt denies contrast allergy. Patient denies taking any blood thinners.      ASA -today  Plavix- today  Right hip replacement  Bilateral shoulders pin  Back-metals      9:52 AM  TRANSFER - OUT REPORT:    Verbal report given to Kirsten James  being transferred to cath lab for ordered procedure       Report consisted of patient's Situation, Background, Assessment and   Recommendations(SBAR).     Information from the following report(s) Nurse Handoff Report was reviewed with the receiving nurse.           Lines:       Opportunity for questions and clarification was provided.      Patient transported with:  Registered Nurse      11:00 am  TRANSFER - IN REPORT:    Verbal report received from Lashell ilsa James  being received from cath lab for routine post-op      Report consisted of patient's Situation, Background, Assessment and   Recommendations(SBAR).     Information from the following report(s) Nurse Handoff Report was reviewed with the receiving nurse.    Opportunity for questions and clarification was provided.      Assessment completed upon patient's arrival to unit and care assumed.         PCI patient meets criteria for outpatient cardiac rehab. [facility] outpatient cardiac rehab referral will be initiated. Educational handouts provided on: PCI procedure, coronary artery disease, coronary artery risk factors, heart healthy eating, and community resources. Reference information on [facility] Outpatient Cardiac Rehab Program also provided. [place] cardiac rehab staff will contact patent, per telephone call, to assess and schedule program participation      EKG- done    11:15 am Notified Dr. Bhatia patient stated that he has like a pinch in the middle of his chest.    Given Tylenol at this time      1:00 pm  2 ml air

## 2024-06-27 NOTE — CARDIO/PULMONARY
[facility] Cardiopulmonary Rehab:  Chart review completed. PMH & HPI events noted.  PCI patient meets criteria for outpatient cardiac rehab. [facility] outpatient cardiac rehab referral initiated. Educational handouts reviewed & provided on: PCI procedure, coronary artery disease, CAD risk factors, heart healthy eating, and community resources. The benefits and format of outpatient Phase II Cardiac Rehab program communicated.  Reference informartion on [facility] outpatient Cardiac Rehab program also provided. [facility] cardiac rehab staff will contact patient by telephone to assess pt's interest and schedule program participation.

## 2024-07-17 LAB
ACT BLD: 232 SECS (ref 79–138)
ACT BLD: 324 SECS (ref 79–138)

## 2024-11-04 ENCOUNTER — OFFICE VISIT (OUTPATIENT)
Age: 64
End: 2024-11-04
Payer: MEDICARE

## 2024-11-04 VITALS
DIASTOLIC BLOOD PRESSURE: 76 MMHG | OXYGEN SATURATION: 98 % | HEIGHT: 71 IN | SYSTOLIC BLOOD PRESSURE: 132 MMHG | HEART RATE: 66 BPM | WEIGHT: 162 LBS | BODY MASS INDEX: 22.68 KG/M2

## 2024-11-04 DIAGNOSIS — I35.0 AORTIC VALVE STENOSIS, ETIOLOGY OF CARDIAC VALVE DISEASE UNSPECIFIED: ICD-10-CM

## 2024-11-04 DIAGNOSIS — I25.118 CORONARY ARTERY DISEASE OF NATIVE ARTERY OF NATIVE HEART WITH STABLE ANGINA PECTORIS (HCC): Primary | ICD-10-CM

## 2024-11-04 DIAGNOSIS — I49.3 PVC'S (PREMATURE VENTRICULAR CONTRACTIONS): ICD-10-CM

## 2024-11-04 DIAGNOSIS — I73.9 PAD (PERIPHERAL ARTERY DISEASE) (HCC): ICD-10-CM

## 2024-11-04 PROCEDURE — G8427 DOCREV CUR MEDS BY ELIG CLIN: HCPCS

## 2024-11-04 PROCEDURE — 1123F ACP DISCUSS/DSCN MKR DOCD: CPT

## 2024-11-04 PROCEDURE — 1036F TOBACCO NON-USER: CPT

## 2024-11-04 PROCEDURE — G8420 CALC BMI NORM PARAMETERS: HCPCS

## 2024-11-04 PROCEDURE — 99214 OFFICE O/P EST MOD 30 MIN: CPT

## 2024-11-04 PROCEDURE — 3017F COLORECTAL CA SCREEN DOC REV: CPT

## 2024-11-04 PROCEDURE — G8484 FLU IMMUNIZE NO ADMIN: HCPCS

## 2024-11-04 PROCEDURE — 93010 ELECTROCARDIOGRAM REPORT: CPT

## 2024-11-04 PROCEDURE — 93005 ELECTROCARDIOGRAM TRACING: CPT

## 2024-11-04 NOTE — PROGRESS NOTES
had concerns including Coronary Artery Disease and Chest Pain.    Vitals:    11/04/24 0913   BP: 132/76   Site: Left Upper Arm   Position: Sitting   Pulse: 66   SpO2: 98%   Weight: 73.5 kg (162 lb)   Height: 1.803 m (5' 11\")        Chest pain No    Refills aspirin        1. Have you been to the ER, urgent care clinic since your last visit? No       Hospitalized since your last visit? No       Where?        Date?  
[address]  [place] [address]  Ph: [phone]   Ph [phone]

## 2025-01-21 ENCOUNTER — TELEPHONE (OUTPATIENT)
Age: 65
End: 2025-01-21

## 2025-01-21 NOTE — TELEPHONE ENCOUNTER
Patient has afib diagnosis and not on anticoagulant and wanting to see if provider will prescribe eliquis or xarelto please follow up with pharmacy for clarity and update patient       Patient insurance [employer] reached out, [id number]   [id number] [place]     #################################    Number given was no good, got aa fast busy everytime it is dialed.    Also pt does not have A fib, and if he was Dx with it, then whomever made that Dx should be the one to prescribe a blood thinner.

## 2025-01-21 NOTE — TELEPHONE ENCOUNTER
Patient has afib diagnosis and not on anticoagulant and wanting to see if provider will prescribe eliquis or xarelto please follow up with pharmacy for clarity and update patient       Patient insurance [employer] reached out, [id number]   [id number] [place]

## 2025-08-29 RX ORDER — ISOSORBIDE MONONITRATE 30 MG/1
30 TABLET, EXTENDED RELEASE ORAL DAILY
Qty: 90 TABLET | Refills: 0 | Status: SHIPPED | OUTPATIENT
Start: 2025-08-29

## (undated) DEVICE — GUIDEWIRE WITH ICE™ HYDROPHILIC COATING: Brand: CHOICE™ PT

## (undated) DEVICE — MEDTRONIC JR4.0 DIAGNOSTIC CATHETER

## (undated) DEVICE — TRAY CATH 16F URIN MTR LTX -- CONVERT TO ITEM 363111

## (undated) DEVICE — COPILOT BLEEDBACK CONTROL VALVE: Brand: COPILOT

## (undated) DEVICE — HEART CATH-SFMC: Brand: MEDLINE INDUSTRIES, INC.

## (undated) DEVICE — WATCHDOG HEMOSTASIS VALVE

## (undated) DEVICE — CATHETER 5FR JR4 MEDTRONIC 100CM

## (undated) DEVICE — X-RAY SPONGES,16 PLY: Brand: DERMACEA

## (undated) DEVICE — GOWN,SIRUS,NONRNF,SETINSLV,2XL,18/CS: Brand: MEDLINE

## (undated) DEVICE — KENDALL SCD EXPRESS SLEEVES, KNEE LENGTH, MEDIUM: Brand: KENDALL SCD

## (undated) DEVICE — PREP SKN PREVAIL 40ML APPL --

## (undated) DEVICE — CATH BLLN ANGIO 3X12MM NC EUPHORIA RX

## (undated) DEVICE — Device: Brand: EAGLE EYE PLATINUM RX DIGITAL IVUS CATHETER

## (undated) DEVICE — VALVE ANGIO ID0.11IN HEMSTAT MTL GUID WIRE INSRT TOOL AND

## (undated) DEVICE — STERILE POLYISOPRENE POWDER-FREE SURGICAL GLOVES WITH EMOLLIENT COATING: Brand: PROTEXIS

## (undated) DEVICE — SUPPORT WRST AD W3.5XL9IN DIA14.5IN ART SFT ADJ HK AND LOOP

## (undated) DEVICE — RUNTHROUGH NS EXTRA FLOPPY PTCA GUIDEWIRE: Brand: RUNTHROUGH

## (undated) DEVICE — HANDPIECE SET WITH BONE CLEANING TIP AND SUCTION TUBE: Brand: INTERPULSE

## (undated) DEVICE — Device: Brand: OMNIWIRE PRESSURE GUIDE WIRE

## (undated) DEVICE — SUTURE VCRL 2-0 L27IN ABSRB UD CP-2 L26MM 1/2 CIR REV CUT J869H

## (undated) DEVICE — MEDI-TRACE CADENCE ADULT, DEFIBRILLATION ELECTRODE -RTS  (10 PR/PK) - PHYSIO-CONTROL: Brand: MEDI-TRACE CADENCE

## (undated) DEVICE — SYSTEM SKIN CLSR 22CM DERMBND PRINEO

## (undated) DEVICE — TR BAND RADIAL ARTERY COMPRESSION DEVICE: Brand: TR BAND

## (undated) DEVICE — CATHETER GUID 6FR L100CM DIA0.071IN NYL SHFT EBU3.5

## (undated) DEVICE — TUBING PRSS MON L12IN PVC RIG NONEXPANDING M TO FEM CONN

## (undated) DEVICE — SOLUTION IV 250ML 0.9% SOD CHL CLR INJ FLX BG CONT PRT CLSR

## (undated) DEVICE — INFECTION CONTROL KIT SYS

## (undated) DEVICE — Device: Brand: ASAHI SION BLUE

## (undated) DEVICE — GUIDEWIRE VASC L180CM DIA0.035IN 3MM PTFE J TIP EXCHG FIX

## (undated) DEVICE — GLIDESHEATH SLENDER STAINLESS STEEL KIT: Brand: GLIDESHEATH SLENDER

## (undated) DEVICE — 4-PORT MANIFOLD: Brand: NEPTUNE 2

## (undated) DEVICE — CATHETER DIAG 6FR L100CM LUMN ID0.056IN JL4 CRV 0 SIDE H

## (undated) DEVICE — LAMINECTOMY RICHMOND-LF: Brand: MEDLINE INDUSTRIES, INC.

## (undated) DEVICE — DEVICE INFL 20ML 30ATM DGT FLD DISPNS SYR W ACCESSPLUS BLU

## (undated) DEVICE — CATHETER DIAG 5FR L100CM LUMN ID0.047IN JL3.5 CRV 0 SIDE H

## (undated) DEVICE — BAND COMPR L24CM REG CLR PLAS HEMSTAT EXT HK AND LOOP RETEN

## (undated) DEVICE — CODMAN® INTEGRATED BIPOLAR CORD AND TUBING SET FLYING LEADS, ROTARY PUMP: Brand: CODMAN®

## (undated) DEVICE — DEVICE TORQ FLRESCNT PNK FOR HEMSTAS VLV

## (undated) DEVICE — KENDALL DL ECG DUAL CONNECT RADIOLUCENT LEAD WIRES, 5-LEAD, SINGLE PATIENT USE: Brand: KENDALL

## (undated) DEVICE — CONTAINER,SPECIMEN,3OZ,OR STRL: Brand: MEDLINE

## (undated) DEVICE — TOOL INSRT 0022IN S STL GWIRE

## (undated) DEVICE — ARGO BAGZ FLUID MANAGEMENT SYSTEM: Brand: ARGO BAGZ FLUID MANAGEMENT SYSTEM

## (undated) DEVICE — GAUZE SPONGES,12 PLY: Brand: CURITY

## (undated) DEVICE — SOLUTION IRRIG 3000ML 0.9% SOD CHL FLX CONT 0797208] ICU MEDICAL INC]

## (undated) DEVICE — SURGIFOAM SPNG SZ 100

## (undated) DEVICE — PAD 05IN BASE 3IN PEAK M DENS CONVOLUTED FOAM

## (undated) DEVICE — SUTURE VCRL SZ 3-0 L27IN ABSRB UD CP-2 L26MM 1/2 CIR REV J868H

## (undated) DEVICE — BONE WAX WHITE: Brand: BONE WAX WHITE

## (undated) DEVICE — COVER,TABLE,HEAVY DUTY,60"X90",STRL: Brand: MEDLINE

## (undated) DEVICE — DRAIN KT WND 10FR RND 400ML --

## (undated) DEVICE — VALVE IV REFLX W/ M/F LUER LCK UNIV CAP STRL DISP

## (undated) DEVICE — SUTURE ABSRB BRAID COAT UD OS-6 NO 1 27IN VCRL J535H

## (undated) DEVICE — CATH BLLN ANGIO 4X6MM NC EUPHORIA RX

## (undated) DEVICE — TOOL 14BA50 LEGEND 14CM 5MM BA: Brand: MIDAS REX ™

## (undated) DEVICE — CATHETER ANGIOPLSTY RX 2.7 FRX139 CM 3X10 MM SCOREFLEX NC

## (undated) DEVICE — PILLOW POS AD L7IN R FOAM HD REST INTUB SLOT DISP

## (undated) DEVICE — MEDTRONIC JL3.5 DIAGNOSTIC CATHETER

## (undated) DEVICE — CATH BLLN ANGIO 3.50X6MM NC EUPHORIA RX